# Patient Record
Sex: FEMALE | Race: BLACK OR AFRICAN AMERICAN | NOT HISPANIC OR LATINO | Employment: FULL TIME | RURAL
[De-identification: names, ages, dates, MRNs, and addresses within clinical notes are randomized per-mention and may not be internally consistent; named-entity substitution may affect disease eponyms.]

---

## 2017-12-19 ENCOUNTER — HISTORICAL (OUTPATIENT)
Dept: ADMINISTRATIVE | Facility: HOSPITAL | Age: 47
End: 2017-12-19

## 2017-12-26 LAB
LAB AP COMMENTS: NORMAL
LAB AP GENERAL CAT - HISTORICAL: NORMAL
LAB AP INTERPRETATION/RESULT - HISTORICAL: NEGATIVE
LAB AP SPECIMEN ADEQUACY - HISTORICAL: NORMAL
LAB AP SPECIMEN SUBMITTED - HISTORICAL: NORMAL

## 2018-08-07 LAB — CRC RECOMMENDATION EXT: NORMAL

## 2019-01-25 ENCOUNTER — HISTORICAL (OUTPATIENT)
Dept: ADMINISTRATIVE | Facility: HOSPITAL | Age: 49
End: 2019-01-25

## 2019-01-25 LAB
LAB AP CLINICAL INFORMATION: NORMAL
LAB AP GENERAL CAT - HISTORICAL: NORMAL
LAB AP INTERPRETATION/RESULT - HISTORICAL: NEGATIVE
LAB AP SPECIMEN ADEQUACY - HISTORICAL: NORMAL
LAB AP SPECIMEN SUBMITTED - HISTORICAL: NORMAL

## 2020-10-30 ENCOUNTER — HISTORICAL (OUTPATIENT)
Dept: ADMINISTRATIVE | Facility: HOSPITAL | Age: 50
End: 2020-10-30

## 2021-06-28 RX ORDER — MELOXICAM 7.5 MG/1
7.5 TABLET ORAL DAILY
COMMUNITY
End: 2021-08-30 | Stop reason: SDUPTHER

## 2021-06-28 RX ORDER — HYDROCHLOROTHIAZIDE 25 MG/1
25 TABLET ORAL DAILY
COMMUNITY
End: 2021-08-30 | Stop reason: SDUPTHER

## 2021-06-28 RX ORDER — ZOLPIDEM TARTRATE 10 MG/1
10 TABLET ORAL NIGHTLY PRN
Qty: 30 TABLET | Refills: 2 | Status: SHIPPED | OUTPATIENT
Start: 2021-06-28 | End: 2021-06-30 | Stop reason: SDUPTHER

## 2021-06-28 RX ORDER — POTASSIUM CHLORIDE 20 MEQ/1
20 TABLET, EXTENDED RELEASE ORAL ONCE
COMMUNITY
End: 2022-06-29 | Stop reason: SDUPTHER

## 2021-06-28 RX ORDER — ZOLPIDEM TARTRATE 10 MG/1
10 TABLET ORAL NIGHTLY PRN
COMMUNITY
End: 2021-06-28 | Stop reason: SDUPTHER

## 2021-06-28 RX ORDER — HYDROXYZINE PAMOATE 25 MG/1
25 CAPSULE ORAL EVERY 8 HOURS PRN
COMMUNITY
End: 2021-08-30 | Stop reason: SDUPTHER

## 2021-06-28 RX ORDER — ESTRADIOL 1 MG/1
1 TABLET ORAL DAILY
COMMUNITY
End: 2022-03-01 | Stop reason: ALTCHOICE

## 2021-06-28 RX ORDER — CETIRIZINE HYDROCHLORIDE 10 MG/1
10 TABLET ORAL DAILY
COMMUNITY
End: 2021-08-30 | Stop reason: SDUPTHER

## 2021-06-30 DIAGNOSIS — G47.00 INSOMNIA, UNSPECIFIED TYPE: Primary | ICD-10-CM

## 2021-06-30 RX ORDER — ZOLPIDEM TARTRATE 10 MG/1
10 TABLET ORAL NIGHTLY PRN
Qty: 30 TABLET | Refills: 2 | Status: SHIPPED | OUTPATIENT
Start: 2021-06-30 | End: 2021-09-29 | Stop reason: SDUPTHER

## 2021-08-30 ENCOUNTER — OFFICE VISIT (OUTPATIENT)
Dept: FAMILY MEDICINE | Facility: CLINIC | Age: 51
End: 2021-08-30
Payer: COMMERCIAL

## 2021-08-30 VITALS
TEMPERATURE: 98 F | BODY MASS INDEX: 34.52 KG/M2 | RESPIRATION RATE: 18 BRPM | HEIGHT: 66 IN | DIASTOLIC BLOOD PRESSURE: 78 MMHG | OXYGEN SATURATION: 96 % | WEIGHT: 214.81 LBS | HEART RATE: 97 BPM | SYSTOLIC BLOOD PRESSURE: 104 MMHG

## 2021-08-30 DIAGNOSIS — R10.9 ABDOMINAL PAIN, UNSPECIFIED ABDOMINAL LOCATION: ICD-10-CM

## 2021-08-30 DIAGNOSIS — L29.9 ITCHING: ICD-10-CM

## 2021-08-30 DIAGNOSIS — M19.90 ARTHRITIS: ICD-10-CM

## 2021-08-30 DIAGNOSIS — I10 HYPERTENSION, UNSPECIFIED TYPE: Primary | ICD-10-CM

## 2021-08-30 DIAGNOSIS — J31.0 RHINITIS, UNSPECIFIED TYPE: ICD-10-CM

## 2021-08-30 LAB
BILIRUB SERPL-MCNC: NEGATIVE MG/DL
BLOOD URINE, POC: NEGATIVE
COLOR, POC UA: YELLOW
GLUCOSE UR QL STRIP: NEGATIVE
KETONES UR QL STRIP: NEGATIVE
LEUKOCYTE ESTERASE URINE, POC: NEGATIVE
NITRITE, POC UA: NEGATIVE
PH, POC UA: 6
PROTEIN, POC: NEGATIVE
SPECIFIC GRAVITY, POC UA: >=1.03
UROBILINOGEN, POC UA: 0.2

## 2021-08-30 PROCEDURE — 3078F PR MOST RECENT DIASTOLIC BLOOD PRESSURE < 80 MM HG: ICD-10-PCS | Mod: ,,, | Performed by: NURSE PRACTITIONER

## 2021-08-30 PROCEDURE — 99214 OFFICE O/P EST MOD 30 MIN: CPT | Mod: ,,, | Performed by: NURSE PRACTITIONER

## 2021-08-30 PROCEDURE — 81003 POCT URINALYSIS W/O SCOPE: ICD-10-PCS | Mod: QW,,, | Performed by: NURSE PRACTITIONER

## 2021-08-30 PROCEDURE — 3074F PR MOST RECENT SYSTOLIC BLOOD PRESSURE < 130 MM HG: ICD-10-PCS | Mod: ,,, | Performed by: NURSE PRACTITIONER

## 2021-08-30 PROCEDURE — 3078F DIAST BP <80 MM HG: CPT | Mod: ,,, | Performed by: NURSE PRACTITIONER

## 2021-08-30 PROCEDURE — 3044F PR MOST RECENT HEMOGLOBIN A1C LEVEL <7.0%: ICD-10-PCS | Mod: ,,, | Performed by: NURSE PRACTITIONER

## 2021-08-30 PROCEDURE — 1160F RVW MEDS BY RX/DR IN RCRD: CPT | Mod: ,,, | Performed by: NURSE PRACTITIONER

## 2021-08-30 PROCEDURE — 3008F BODY MASS INDEX DOCD: CPT | Mod: ,,, | Performed by: NURSE PRACTITIONER

## 2021-08-30 PROCEDURE — 3044F HG A1C LEVEL LT 7.0%: CPT | Mod: ,,, | Performed by: NURSE PRACTITIONER

## 2021-08-30 PROCEDURE — 1159F PR MEDICATION LIST DOCUMENTED IN MEDICAL RECORD: ICD-10-PCS | Mod: ,,, | Performed by: NURSE PRACTITIONER

## 2021-08-30 PROCEDURE — 3008F PR BODY MASS INDEX (BMI) DOCUMENTED: ICD-10-PCS | Mod: ,,, | Performed by: NURSE PRACTITIONER

## 2021-08-30 PROCEDURE — 81003 URINALYSIS AUTO W/O SCOPE: CPT | Mod: QW,,, | Performed by: NURSE PRACTITIONER

## 2021-08-30 PROCEDURE — 3074F SYST BP LT 130 MM HG: CPT | Mod: ,,, | Performed by: NURSE PRACTITIONER

## 2021-08-30 PROCEDURE — 99214 PR OFFICE/OUTPT VISIT, EST, LEVL IV, 30-39 MIN: ICD-10-PCS | Mod: ,,, | Performed by: NURSE PRACTITIONER

## 2021-08-30 PROCEDURE — 1160F PR REVIEW ALL MEDS BY PRESCRIBER/CLIN PHARMACIST DOCUMENTED: ICD-10-PCS | Mod: ,,, | Performed by: NURSE PRACTITIONER

## 2021-08-30 PROCEDURE — 1159F MED LIST DOCD IN RCRD: CPT | Mod: ,,, | Performed by: NURSE PRACTITIONER

## 2021-08-30 RX ORDER — HYDROCHLOROTHIAZIDE 25 MG/1
25 TABLET ORAL DAILY
Qty: 90 TABLET | Refills: 1 | Status: SHIPPED | OUTPATIENT
Start: 2021-08-30 | End: 2022-03-01 | Stop reason: SDUPTHER

## 2021-08-30 RX ORDER — CETIRIZINE HYDROCHLORIDE 10 MG/1
10 TABLET ORAL DAILY
Qty: 90 TABLET | Refills: 1 | Status: SHIPPED | OUTPATIENT
Start: 2021-08-30 | End: 2022-02-14 | Stop reason: SDUPTHER

## 2021-08-30 RX ORDER — MELOXICAM 7.5 MG/1
7.5 TABLET ORAL DAILY
Qty: 90 TABLET | Refills: 1 | Status: SHIPPED | OUTPATIENT
Start: 2021-08-30 | End: 2022-03-01 | Stop reason: SDUPTHER

## 2021-08-30 RX ORDER — HYDROXYZINE PAMOATE 25 MG/1
25 CAPSULE ORAL EVERY 8 HOURS PRN
Qty: 30 CAPSULE | Refills: 2 | Status: SHIPPED | OUTPATIENT
Start: 2021-08-30 | End: 2022-11-03

## 2021-09-16 ENCOUNTER — OFFICE VISIT (OUTPATIENT)
Dept: GASTROENTEROLOGY | Facility: CLINIC | Age: 51
End: 2021-09-16
Payer: COMMERCIAL

## 2021-09-16 VITALS
BODY MASS INDEX: 34.55 KG/M2 | DIASTOLIC BLOOD PRESSURE: 83 MMHG | RESPIRATION RATE: 17 BRPM | HEART RATE: 66 BPM | HEIGHT: 66 IN | SYSTOLIC BLOOD PRESSURE: 114 MMHG | OXYGEN SATURATION: 100 % | WEIGHT: 215 LBS

## 2021-09-16 DIAGNOSIS — R10.9 ABDOMINAL PAIN, UNSPECIFIED ABDOMINAL LOCATION: ICD-10-CM

## 2021-09-16 DIAGNOSIS — R10.33 PERIUMBILICAL PAIN: Primary | ICD-10-CM

## 2021-09-16 PROCEDURE — 3008F BODY MASS INDEX DOCD: CPT | Mod: ,,, | Performed by: NURSE PRACTITIONER

## 2021-09-16 PROCEDURE — 3074F SYST BP LT 130 MM HG: CPT | Mod: ,,, | Performed by: NURSE PRACTITIONER

## 2021-09-16 PROCEDURE — 3079F DIAST BP 80-89 MM HG: CPT | Mod: ,,, | Performed by: NURSE PRACTITIONER

## 2021-09-16 PROCEDURE — 3044F PR MOST RECENT HEMOGLOBIN A1C LEVEL <7.0%: ICD-10-PCS | Mod: ,,, | Performed by: NURSE PRACTITIONER

## 2021-09-16 PROCEDURE — 3008F PR BODY MASS INDEX (BMI) DOCUMENTED: ICD-10-PCS | Mod: ,,, | Performed by: NURSE PRACTITIONER

## 2021-09-16 PROCEDURE — 99204 OFFICE O/P NEW MOD 45 MIN: CPT | Mod: ,,, | Performed by: NURSE PRACTITIONER

## 2021-09-16 PROCEDURE — 3079F PR MOST RECENT DIASTOLIC BLOOD PRESSURE 80-89 MM HG: ICD-10-PCS | Mod: ,,, | Performed by: NURSE PRACTITIONER

## 2021-09-16 PROCEDURE — 99204 PR OFFICE/OUTPT VISIT, NEW, LEVL IV, 45-59 MIN: ICD-10-PCS | Mod: ,,, | Performed by: NURSE PRACTITIONER

## 2021-09-16 PROCEDURE — 3074F PR MOST RECENT SYSTOLIC BLOOD PRESSURE < 130 MM HG: ICD-10-PCS | Mod: ,,, | Performed by: NURSE PRACTITIONER

## 2021-09-16 PROCEDURE — 3044F HG A1C LEVEL LT 7.0%: CPT | Mod: ,,, | Performed by: NURSE PRACTITIONER

## 2021-09-16 RX ORDER — HYDROCODONE BITARTRATE AND ACETAMINOPHEN 5; 325 MG/1; MG/1
1 TABLET ORAL
COMMUNITY
Start: 2021-07-15 | End: 2023-05-09

## 2021-09-16 RX ORDER — PANTOPRAZOLE SODIUM 40 MG/1
40 TABLET, DELAYED RELEASE ORAL DAILY
Qty: 90 TABLET | Refills: 3 | Status: SHIPPED | OUTPATIENT
Start: 2021-09-16 | End: 2022-01-31 | Stop reason: SDUPTHER

## 2021-09-16 RX ORDER — ESTERIFIED ESTROGEN AND METHYLTESTOSTERONE .625; 1.25 MG/1; MG/1
1 TABLET ORAL DAILY
COMMUNITY
Start: 2021-07-16 | End: 2022-06-28

## 2021-09-16 RX ORDER — TOPIRAMATE 100 MG/1
100 TABLET, FILM COATED ORAL NIGHTLY
COMMUNITY
Start: 2021-08-28 | End: 2022-11-03

## 2021-09-16 RX ORDER — SEMAGLUTIDE 1.34 MG/ML
INJECTION, SOLUTION SUBCUTANEOUS
COMMUNITY
Start: 2021-08-28 | End: 2022-06-28

## 2021-09-16 RX ORDER — POTASSIUM CHLORIDE 1500 MG/1
20 TABLET, EXTENDED RELEASE ORAL DAILY
COMMUNITY
Start: 2021-05-08 | End: 2022-06-29 | Stop reason: SDUPTHER

## 2021-09-27 DIAGNOSIS — Z11.52 ENCOUNTER FOR SCREENING FOR COVID-19: Primary | ICD-10-CM

## 2021-09-29 ENCOUNTER — HOSPITAL ENCOUNTER (OUTPATIENT)
Dept: GASTROENTEROLOGY | Facility: HOSPITAL | Age: 51
Discharge: HOME | End: 2021-09-29
Attending: NURSE PRACTITIONER
Payer: COMMERCIAL

## 2021-09-29 ENCOUNTER — ANESTHESIA (OUTPATIENT)
Dept: GASTROENTEROLOGY | Facility: HOSPITAL | Age: 51
End: 2021-09-29
Payer: COMMERCIAL

## 2021-09-29 ENCOUNTER — ANESTHESIA EVENT (OUTPATIENT)
Dept: GASTROENTEROLOGY | Facility: HOSPITAL | Age: 51
End: 2021-09-29
Payer: COMMERCIAL

## 2021-09-29 VITALS
DIASTOLIC BLOOD PRESSURE: 71 MMHG | BODY MASS INDEX: 34.54 KG/M2 | HEART RATE: 70 BPM | OXYGEN SATURATION: 100 % | RESPIRATION RATE: 13 BRPM | TEMPERATURE: 98 F | WEIGHT: 214 LBS | SYSTOLIC BLOOD PRESSURE: 101 MMHG

## 2021-09-29 DIAGNOSIS — G47.00 INSOMNIA, UNSPECIFIED TYPE: ICD-10-CM

## 2021-09-29 DIAGNOSIS — R10.33 PERIUMBILICAL PAIN: ICD-10-CM

## 2021-09-29 PROCEDURE — 63600175 PHARM REV CODE 636 W HCPCS: Performed by: NURSE ANESTHETIST, CERTIFIED REGISTERED

## 2021-09-29 PROCEDURE — 88305 TISSUE EXAM BY PATHOLOGIST: CPT | Mod: SUR | Performed by: STUDENT IN AN ORGANIZED HEALTH CARE EDUCATION/TRAINING PROGRAM

## 2021-09-29 PROCEDURE — 43239 PR EGD, FLEX, W/BIOPSY, SGL/MULTI: ICD-10-PCS | Mod: ,,, | Performed by: STUDENT IN AN ORGANIZED HEALTH CARE EDUCATION/TRAINING PROGRAM

## 2021-09-29 PROCEDURE — D9220A PRA ANESTHESIA: ICD-10-PCS | Mod: ,,, | Performed by: NURSE ANESTHETIST, CERTIFIED REGISTERED

## 2021-09-29 PROCEDURE — 43239 EGD BIOPSY SINGLE/MULTIPLE: CPT | Mod: ,,, | Performed by: STUDENT IN AN ORGANIZED HEALTH CARE EDUCATION/TRAINING PROGRAM

## 2021-09-29 PROCEDURE — 25000003 PHARM REV CODE 250: Performed by: NURSE ANESTHETIST, CERTIFIED REGISTERED

## 2021-09-29 PROCEDURE — 27201423 OPTIME MED/SURG SUP & DEVICES STERILE SUPPLY

## 2021-09-29 PROCEDURE — 37000008 HC ANESTHESIA 1ST 15 MINUTES

## 2021-09-29 PROCEDURE — 88305 TISSUE EXAM BY PATHOLOGIST: CPT | Mod: 26,,, | Performed by: PATHOLOGY

## 2021-09-29 PROCEDURE — C1889 IMPLANT/INSERT DEVICE, NOC: HCPCS

## 2021-09-29 PROCEDURE — 88305 SURGICAL PATHOLOGY: ICD-10-PCS | Mod: 26,,, | Performed by: PATHOLOGY

## 2021-09-29 PROCEDURE — 25000003 PHARM REV CODE 250: Performed by: STUDENT IN AN ORGANIZED HEALTH CARE EDUCATION/TRAINING PROGRAM

## 2021-09-29 PROCEDURE — D9220A PRA ANESTHESIA: Mod: ,,, | Performed by: NURSE ANESTHETIST, CERTIFIED REGISTERED

## 2021-09-29 PROCEDURE — 88342 IMHCHEM/IMCYTCHM 1ST ANTB: CPT | Mod: 26,,, | Performed by: PATHOLOGY

## 2021-09-29 PROCEDURE — 43239 EGD BIOPSY SINGLE/MULTIPLE: CPT

## 2021-09-29 PROCEDURE — 88342 SURGICAL PATHOLOGY: ICD-10-PCS | Mod: 26,,, | Performed by: PATHOLOGY

## 2021-09-29 RX ORDER — LIDOCAINE HYDROCHLORIDE 20 MG/ML
INJECTION INTRAVENOUS
Status: DISCONTINUED | OUTPATIENT
Start: 2021-09-29 | End: 2021-09-29

## 2021-09-29 RX ORDER — ZOLPIDEM TARTRATE 10 MG/1
10 TABLET ORAL NIGHTLY PRN
Qty: 30 TABLET | Refills: 2 | Status: SHIPPED | OUTPATIENT
Start: 2021-09-29 | End: 2021-12-27 | Stop reason: SDUPTHER

## 2021-09-29 RX ORDER — PROPOFOL 10 MG/ML
VIAL (ML) INTRAVENOUS
Status: DISCONTINUED | OUTPATIENT
Start: 2021-09-29 | End: 2021-09-29

## 2021-09-29 RX ORDER — SODIUM CHLORIDE 9 MG/ML
INJECTION, SOLUTION INTRAVENOUS CONTINUOUS
Status: DISCONTINUED | OUTPATIENT
Start: 2021-09-29 | End: 2021-09-30 | Stop reason: HOSPADM

## 2021-09-29 RX ORDER — SODIUM CHLORIDE 0.9 % (FLUSH) 0.9 %
10 SYRINGE (ML) INJECTION
Status: DISCONTINUED | OUTPATIENT
Start: 2021-09-29 | End: 2021-09-30 | Stop reason: HOSPADM

## 2021-09-29 RX ADMIN — SODIUM CHLORIDE: 9 INJECTION, SOLUTION INTRAVENOUS at 10:09

## 2021-09-29 RX ADMIN — LIDOCAINE HYDROCHLORIDE 50 MG: 20 INJECTION, SOLUTION INTRAVENOUS at 10:09

## 2021-09-29 RX ADMIN — PROPOFOL 100 MG: 10 INJECTION, EMULSION INTRAVENOUS at 10:09

## 2021-09-30 LAB
ESTROGEN SERPL-MCNC: NORMAL PG/ML
LAB AP GROSS DESCRIPTION: NORMAL
LAB AP LABORATORY NOTES: NORMAL
T3RU NFR SERPL: NORMAL %

## 2021-11-01 ENCOUNTER — OFFICE VISIT (OUTPATIENT)
Dept: OBSTETRICS AND GYNECOLOGY | Facility: CLINIC | Age: 51
End: 2021-11-01
Payer: COMMERCIAL

## 2021-11-01 VITALS
DIASTOLIC BLOOD PRESSURE: 71 MMHG | WEIGHT: 220.38 LBS | BODY MASS INDEX: 35.42 KG/M2 | SYSTOLIC BLOOD PRESSURE: 100 MMHG | HEIGHT: 66 IN

## 2021-11-01 DIAGNOSIS — Z01.419 WOMEN'S ANNUAL ROUTINE GYNECOLOGICAL EXAMINATION: Primary | ICD-10-CM

## 2021-11-01 DIAGNOSIS — Z12.4 SCREENING FOR MALIGNANT NEOPLASM OF THE CERVIX: ICD-10-CM

## 2021-11-01 DIAGNOSIS — N95.1 MENOPAUSAL AND FEMALE CLIMACTERIC STATES: ICD-10-CM

## 2021-11-01 DIAGNOSIS — R68.82 DECREASED LIBIDO: ICD-10-CM

## 2021-11-01 PROCEDURE — 88142 CYTOPATH C/V THIN LAYER: CPT | Mod: GCY | Performed by: OBSTETRICS & GYNECOLOGY

## 2021-11-01 PROCEDURE — 1159F MED LIST DOCD IN RCRD: CPT | Mod: ,,, | Performed by: OBSTETRICS & GYNECOLOGY

## 2021-11-01 PROCEDURE — 3074F SYST BP LT 130 MM HG: CPT | Mod: ,,, | Performed by: OBSTETRICS & GYNECOLOGY

## 2021-11-01 PROCEDURE — 3008F BODY MASS INDEX DOCD: CPT | Mod: ,,, | Performed by: OBSTETRICS & GYNECOLOGY

## 2021-11-01 PROCEDURE — 3044F PR MOST RECENT HEMOGLOBIN A1C LEVEL <7.0%: ICD-10-PCS | Mod: ,,, | Performed by: OBSTETRICS & GYNECOLOGY

## 2021-11-01 PROCEDURE — 1159F PR MEDICATION LIST DOCUMENTED IN MEDICAL RECORD: ICD-10-PCS | Mod: ,,, | Performed by: OBSTETRICS & GYNECOLOGY

## 2021-11-01 PROCEDURE — 3074F PR MOST RECENT SYSTOLIC BLOOD PRESSURE < 130 MM HG: ICD-10-PCS | Mod: ,,, | Performed by: OBSTETRICS & GYNECOLOGY

## 2021-11-01 PROCEDURE — 3044F HG A1C LEVEL LT 7.0%: CPT | Mod: ,,, | Performed by: OBSTETRICS & GYNECOLOGY

## 2021-11-01 PROCEDURE — 99214 OFFICE O/P EST MOD 30 MIN: CPT | Mod: PBBFAC | Performed by: OBSTETRICS & GYNECOLOGY

## 2021-11-01 PROCEDURE — 99396 PR PREVENTIVE VISIT,EST,40-64: ICD-10-PCS | Mod: S$PBB,,, | Performed by: OBSTETRICS & GYNECOLOGY

## 2021-11-01 PROCEDURE — 3078F PR MOST RECENT DIASTOLIC BLOOD PRESSURE < 80 MM HG: ICD-10-PCS | Mod: ,,, | Performed by: OBSTETRICS & GYNECOLOGY

## 2021-11-01 PROCEDURE — 3078F DIAST BP <80 MM HG: CPT | Mod: ,,, | Performed by: OBSTETRICS & GYNECOLOGY

## 2021-11-01 PROCEDURE — 1160F PR REVIEW ALL MEDS BY PRESCRIBER/CLIN PHARMACIST DOCUMENTED: ICD-10-PCS | Mod: ,,, | Performed by: OBSTETRICS & GYNECOLOGY

## 2021-11-01 PROCEDURE — 1160F RVW MEDS BY RX/DR IN RCRD: CPT | Mod: ,,, | Performed by: OBSTETRICS & GYNECOLOGY

## 2021-11-01 PROCEDURE — 99396 PREV VISIT EST AGE 40-64: CPT | Mod: S$PBB,,, | Performed by: OBSTETRICS & GYNECOLOGY

## 2021-11-01 PROCEDURE — 3008F PR BODY MASS INDEX (BMI) DOCUMENTED: ICD-10-PCS | Mod: ,,, | Performed by: OBSTETRICS & GYNECOLOGY

## 2021-11-01 RX ORDER — ESTRADIOL 1 MG/1
1 TABLET ORAL DAILY
Qty: 90 TABLET | Refills: 3 | Status: SHIPPED | OUTPATIENT
Start: 2021-11-01 | End: 2022-11-07

## 2021-11-03 LAB
GH SERPL-MCNC: NORMAL NG/ML
INSULIN SERPL-ACNC: NORMAL U[IU]/ML
LAB AP CLINICAL INFORMATION: NORMAL
LAB AP GYN INTERPRETATION: NEGATIVE
LAB AP PAP DISCLAIMER COMMENTS: NORMAL
RENIN PLAS-CCNC: NORMAL NG/ML/H

## 2021-12-27 DIAGNOSIS — G47.00 INSOMNIA, UNSPECIFIED TYPE: ICD-10-CM

## 2021-12-27 RX ORDER — ZOLPIDEM TARTRATE 10 MG/1
10 TABLET ORAL NIGHTLY PRN
Qty: 30 TABLET | Refills: 2 | Status: SHIPPED | OUTPATIENT
Start: 2021-12-27 | End: 2022-03-01

## 2022-01-31 RX ORDER — PANTOPRAZOLE SODIUM 40 MG/1
40 TABLET, DELAYED RELEASE ORAL DAILY
Qty: 30 TABLET | Refills: 3 | Status: SHIPPED | OUTPATIENT
Start: 2022-01-31 | End: 2022-03-01 | Stop reason: SDUPTHER

## 2022-02-14 DIAGNOSIS — J31.0 RHINITIS, UNSPECIFIED TYPE: ICD-10-CM

## 2022-02-14 RX ORDER — CETIRIZINE HYDROCHLORIDE 10 MG/1
10 TABLET ORAL DAILY
Qty: 90 TABLET | Refills: 1 | Status: SHIPPED | OUTPATIENT
Start: 2022-02-14 | End: 2022-03-01 | Stop reason: SDUPTHER

## 2022-02-14 NOTE — TELEPHONE ENCOUNTER
----- Message from Phyllis Prasad sent at 2/14/2022  2:07 PM CST -----  Regarding: med refiill  Made pt an appt for march 1, she needs a refill on hr zyrtec to [facility] in [place]

## 2022-03-01 ENCOUNTER — OFFICE VISIT (OUTPATIENT)
Dept: FAMILY MEDICINE | Facility: CLINIC | Age: 52
End: 2022-03-01
Payer: COMMERCIAL

## 2022-03-01 VITALS
DIASTOLIC BLOOD PRESSURE: 64 MMHG | WEIGHT: 216.63 LBS | HEART RATE: 80 BPM | SYSTOLIC BLOOD PRESSURE: 100 MMHG | TEMPERATURE: 98 F | OXYGEN SATURATION: 100 % | HEIGHT: 67 IN | BODY MASS INDEX: 34 KG/M2 | RESPIRATION RATE: 20 BRPM

## 2022-03-01 DIAGNOSIS — K21.9 GASTROESOPHAGEAL REFLUX DISEASE WITHOUT ESOPHAGITIS: ICD-10-CM

## 2022-03-01 DIAGNOSIS — G47.09 OTHER INSOMNIA: ICD-10-CM

## 2022-03-01 DIAGNOSIS — I10 ESSENTIAL HYPERTENSION: ICD-10-CM

## 2022-03-01 DIAGNOSIS — K59.09 CHRONIC CONSTIPATION: Primary | ICD-10-CM

## 2022-03-01 DIAGNOSIS — J30.89 SEASONAL ALLERGIC RHINITIS DUE TO OTHER ALLERGIC TRIGGER: ICD-10-CM

## 2022-03-01 DIAGNOSIS — B37.0 THRUSH, ORAL: ICD-10-CM

## 2022-03-01 DIAGNOSIS — M79.674 PAIN OF TOE OF RIGHT FOOT: ICD-10-CM

## 2022-03-01 DIAGNOSIS — L60.2 THICKENED NAIL: ICD-10-CM

## 2022-03-01 DIAGNOSIS — M19.90 ARTHRITIS: ICD-10-CM

## 2022-03-01 PROCEDURE — 3008F PR BODY MASS INDEX (BMI) DOCUMENTED: ICD-10-PCS | Mod: ,,, | Performed by: FAMILY MEDICINE

## 2022-03-01 PROCEDURE — 1159F PR MEDICATION LIST DOCUMENTED IN MEDICAL RECORD: ICD-10-PCS | Mod: ,,, | Performed by: FAMILY MEDICINE

## 2022-03-01 PROCEDURE — 1159F MED LIST DOCD IN RCRD: CPT | Mod: ,,, | Performed by: FAMILY MEDICINE

## 2022-03-01 PROCEDURE — 3074F SYST BP LT 130 MM HG: CPT | Mod: ,,, | Performed by: FAMILY MEDICINE

## 2022-03-01 PROCEDURE — 3078F DIAST BP <80 MM HG: CPT | Mod: ,,, | Performed by: FAMILY MEDICINE

## 2022-03-01 PROCEDURE — 99214 OFFICE O/P EST MOD 30 MIN: CPT | Mod: ,,, | Performed by: FAMILY MEDICINE

## 2022-03-01 PROCEDURE — 3074F PR MOST RECENT SYSTOLIC BLOOD PRESSURE < 130 MM HG: ICD-10-PCS | Mod: ,,, | Performed by: FAMILY MEDICINE

## 2022-03-01 PROCEDURE — 1160F PR REVIEW ALL MEDS BY PRESCRIBER/CLIN PHARMACIST DOCUMENTED: ICD-10-PCS | Mod: ,,, | Performed by: FAMILY MEDICINE

## 2022-03-01 PROCEDURE — 3078F PR MOST RECENT DIASTOLIC BLOOD PRESSURE < 80 MM HG: ICD-10-PCS | Mod: ,,, | Performed by: FAMILY MEDICINE

## 2022-03-01 PROCEDURE — 3008F BODY MASS INDEX DOCD: CPT | Mod: ,,, | Performed by: FAMILY MEDICINE

## 2022-03-01 PROCEDURE — 1160F RVW MEDS BY RX/DR IN RCRD: CPT | Mod: ,,, | Performed by: FAMILY MEDICINE

## 2022-03-01 PROCEDURE — 99214 PR OFFICE/OUTPT VISIT, EST, LEVL IV, 30-39 MIN: ICD-10-PCS | Mod: ,,, | Performed by: FAMILY MEDICINE

## 2022-03-01 RX ORDER — HYDROCHLOROTHIAZIDE 25 MG/1
25 TABLET ORAL DAILY
Qty: 90 TABLET | Refills: 1 | Status: SHIPPED | OUTPATIENT
Start: 2022-03-01 | End: 2022-06-28 | Stop reason: SDUPTHER

## 2022-03-01 RX ORDER — TRAZODONE HYDROCHLORIDE 50 MG/1
50 TABLET ORAL NIGHTLY
Qty: 30 TABLET | Refills: 3 | Status: SHIPPED | OUTPATIENT
Start: 2022-03-01 | End: 2022-03-28 | Stop reason: DRUGHIGH

## 2022-03-01 RX ORDER — POLYETHYLENE GLYCOL 3350 17 G/17G
POWDER, FOR SOLUTION ORAL
Qty: 1530 G | Refills: 3 | Status: SHIPPED | OUTPATIENT
Start: 2022-03-01 | End: 2022-11-03

## 2022-03-01 RX ORDER — PANTOPRAZOLE SODIUM 40 MG/1
40 TABLET, DELAYED RELEASE ORAL DAILY
Qty: 30 TABLET | Refills: 5 | Status: SHIPPED | OUTPATIENT
Start: 2022-03-01 | End: 2022-11-03 | Stop reason: SDUPTHER

## 2022-03-01 RX ORDER — MELOXICAM 7.5 MG/1
7.5 TABLET ORAL DAILY
Qty: 90 TABLET | Refills: 1 | Status: SHIPPED | OUTPATIENT
Start: 2022-03-01 | End: 2022-06-28 | Stop reason: SDUPTHER

## 2022-03-01 RX ORDER — NYSTATIN 100000 [USP'U]/ML
SUSPENSION ORAL
Qty: 200 ML | Refills: 0 | Status: SHIPPED | OUTPATIENT
Start: 2022-03-01 | End: 2022-10-19 | Stop reason: SDUPTHER

## 2022-03-01 RX ORDER — DOCUSATE SODIUM 100 MG/1
100 CAPSULE, LIQUID FILLED ORAL 2 TIMES DAILY
Qty: 180 CAPSULE | Refills: 1 | Status: SHIPPED | OUTPATIENT
Start: 2022-03-01 | End: 2022-06-28 | Stop reason: SDUPTHER

## 2022-03-01 RX ORDER — CETIRIZINE HYDROCHLORIDE 10 MG/1
10 TABLET ORAL DAILY
Qty: 90 TABLET | Refills: 1 | Status: SHIPPED | OUTPATIENT
Start: 2022-03-01 | End: 2022-06-28 | Stop reason: SDUPTHER

## 2022-03-01 RX ORDER — ZOLPIDEM TARTRATE 10 MG/1
10 TABLET ORAL NIGHTLY PRN
Qty: 30 TABLET | Refills: 2 | Status: CANCELLED | OUTPATIENT
Start: 2022-03-01

## 2022-03-01 NOTE — PROGRESS NOTES
"Clinic Note    Patient Name: Marcell Tinoco  : 1970  MRN: [id number]    HPI:    Chief Complaint   Patient presents with    Medication Refill     CONSTIPATION problems taking otc meds but wants a prescription.    Follow-up     F/u urgent care left ear still having problems from last weekend, also have concerns with  SORE TONGUE    Foot Pain     Right toe possible ingrown toenail     Ms. Marcell Tinoco is a 51 y.o. female who present to clinic today with CC of follow up on chronic disease processes including HTN, GERD, seasonal allergies, menopausal hot flashes, and OA related pain.  Patient reports chronic issues are well controlled on current medication regimen.   Denies problems or side effects with current medication regimen.   Patient does report a h/o insomnia. Reports she was previously prescribed ambien but states it is not working for her anymore.   Reports chronic issues with constipation. Reports she has been taking OTC laxatives but would like to try a different medication that she can take daily.  She reports her last colonoscopy was at age 48 at [facility]. She had polyps removed and was told to repeat in 5 years at age 53. Reports this is not due.  She has had an EGD within the last year at [facility].  Reports she saw urgent care 1 week ago with L ear pain. Reports she is still having issues with L ear and reports her tongue is sore. Reports she feels like there is something in her ear.  Reports some R foot/great toe pain. Reports she may have an ingrown toenail. Reports it is been sore for over a month. Reports she had a pedicure and "they pulled out an ingrown toenail". Denies erythema or active drainage or increased warmth. Reports, however, it is still painful.   Patient is, otherwise, without complaints.     Medications:  Current Outpatient Medications on File Prior to Visit   Medication Sig Dispense Refill    estradioL (ESTRACE) 1 MG tablet Take 1 mg by mouth once daily.   "    estradioL (ESTRACE) 1 MG tablet Take 1 tablet (1 mg total) by mouth once daily. 90 tablet 3    hydrOXYzine pamoate (VISTARIL) 25 MG Cap Take 1 capsule (25 mg total) by mouth every 8 (eight) hours as needed (itching). 30 capsule 2    potassium chloride SA (K-DUR,KLOR-CON) 20 MEQ tablet Take 20 mEq by mouth once. Extended Release 20 meq po daily      topiramate (TOPAMAX) 100 MG tablet Take 100 mg by mouth every evening.      [DISCONTINUED] cetirizine (ZYRTEC) 10 MG tablet Take 1 tablet (10 mg total) by mouth once daily. 90 tablet 1    [DISCONTINUED] hydroCHLOROthiazide (HYDRODIURIL) 25 MG tablet Take 1 tablet (25 mg total) by mouth once daily. 90 tablet 1    [DISCONTINUED] meloxicam (MOBIC) 7.5 MG tablet Take 1 tablet (7.5 mg total) by mouth once daily. 90 tablet 1    [DISCONTINUED] pantoprazole (PROTONIX) 40 MG tablet Take 1 tablet (40 mg total) by mouth once daily. 30 tablet 3    [DISCONTINUED] zolpidem (AMBIEN) 10 mg Tab Take 1 tablet (10 mg total) by mouth nightly as needed (as needed for insomnia). 30 tablet 2    estrogens,conjugated,-methyltestosterone 0.625-1.25mg (ESTRATEST HS) 0.625-1.25 mg per tablet Take 1 tablet by mouth once daily.      HYDROcodone-acetaminophen (NORCO) 5-325 mg per tablet Take 1 tablet by mouth every 4 to 6 hours as needed.      OZEMPIC 1 mg/dose (4 mg/3 mL) INJECT 1 MG UNDER THE SKIN WEEKLY      potassium chloride (K-TAB) 20 mEq Take 20 mEq by mouth once daily.       No current facility-administered medications on file prior to visit.         Allergies: Sulfa (sulfonamide antibiotics)      Past Medical History:    Past Medical History:   Diagnosis Date    Arthritis        Past Surgical History:    Past Surgical History:   Procedure Laterality Date    breast reduction      CHOLECYSTECTOMY  10/28/2013    Dr. Paige    COLONOSCOPY  2018    gastric sleeve      HYSTERECTOMY  10/28/2013    Robotic, BSO,Cystoscopy, TOT-    OOPHORECTOMY      TUBAL LIGATION       "tummy tuck           Social History:    Social History     Tobacco Use   Smoking Status Never Smoker   Smokeless Tobacco Never Used     Social History     Substance and Sexual Activity   Alcohol Use Not Currently     Social History     Substance and Sexual Activity   Drug Use Never         Family History:    Family History   Problem Relation Age of Onset    Colon cancer Sister        Review of Systems:    Review of Systems   Constitutional: Negative for appetite change, chills, fatigue, fever and unexpected weight change.   Eyes: Negative for visual disturbance.   Respiratory: Negative for cough and shortness of breath.    Cardiovascular: Negative for chest pain and leg swelling.   Gastrointestinal: Positive for constipation. Negative for abdominal pain, change in bowel habit, diarrhea, nausea, vomiting and change in bowel habit.        Reports last BM was last night   Musculoskeletal: Negative for arthralgias.   Integumentary:  Negative for rash.   Neurological: Negative for dizziness and headaches.   Psychiatric/Behavioral: The patient is not nervous/anxious.         Vitals:    /64 (BP Location: Right arm, Patient Position: Sitting, BP Method: Medium (Manual))   Pulse 80   Temp 97.6 °F (36.4 °C) (Temporal)   Resp 20   Ht 5' 7" (1.702 m)   Wt 98.2 kg (216 lb 9.6 oz)   SpO2 100%   BMI 33.92 kg/m²        Physical Exam:    Physical Exam  Constitutional:       General: She is not in acute distress.     Appearance: Normal appearance.   HENT:      Right Ear: Tympanic membrane normal.      Left Ear: Tympanic membrane normal.      Nose: Nose normal.      Mouth/Throat:      Mouth: Mucous membranes are moist. No oral lesions.      Pharynx: Oropharynx is clear.   Eyes:      Conjunctiva/sclera: Conjunctivae normal.   Cardiovascular:      Rate and Rhythm: Normal rate and regular rhythm.      Heart sounds: Normal heart sounds. No murmur heard.  Pulmonary:      Effort: Pulmonary effort is normal. No respiratory " distress.      Breath sounds: Normal breath sounds. No wheezing, rhonchi or rales.   Abdominal:      General: Bowel sounds are normal.      Palpations: Abdomen is soft.      Tenderness: There is no abdominal tenderness.   Musculoskeletal:      Cervical back: Neck supple.   Feet:      Right foot:      Skin integrity: No erythema or warmth.      Toenail Condition: Right toenails are abnormally thick.   Skin:     Findings: No rash.   Neurological:      General: No focal deficit present.      Mental Status: She is alert. Mental status is at baseline.   Psychiatric:         Mood and Affect: Mood normal.         Assessment/Plan:   Chronic constipation  -     polyethylene glycol (GLYCOLAX) 17 gram/dose powder; Mix one capful with 8 ounces of juice or water and drink daily as needed for constipation  Dispense: 1530 g; Refill: 3  -     docusate sodium (COLACE) 100 MG capsule; Take 1 capsule (100 mg total) by mouth 2 (two) times daily. For constipation  Dispense: 180 capsule; Refill: 1    Seasonal allergic rhinitis due to other allergic trigger  -     cetirizine (ZYRTEC) 10 MG tablet; Take 1 tablet (10 mg total) by mouth once daily.  Dispense: 90 tablet; Refill: 1    Arthritis  -     meloxicam (MOBIC) 7.5 MG tablet; Take 1 tablet (7.5 mg total) by mouth once daily.  Dispense: 90 tablet; Refill: 1    Essential hypertension  -     hydroCHLOROthiazide (HYDRODIURIL) 25 MG tablet; Take 1 tablet (25 mg total) by mouth once daily.  Dispense: 90 tablet; Refill: 1    Other insomnia  -     traZODone (DESYREL) 50 MG tablet; Take 1 tablet (50 mg total) by mouth every evening. For insomnia  Dispense: 30 tablet; Refill: 3 - new medication  - D/c ambien as it is ineffective  - Risks/benefits/potential side effects of trazodone reviewed and discussed with patient    Pain of toe of right foot  -     Ambulatory referral/consult to Podiatry; Future; Expected date: 03/08/2022    Thickened nail  -     Ambulatory referral/consult to Podiatry;  Future; Expected date: 03/08/2022    Gastroesophageal reflux disease without esophagitis  -     pantoprazole (PROTONIX) 40 MG tablet; Take 1 tablet (40 mg total) by mouth once daily.  Dispense: 30 tablet; Refill: 5    Thrush, oral  -     nystatin (MYCOSTATIN) 100,000 unit/mL suspension; Swish and spit 5 mL by mouth 4 X daily X 10 days  Dispense: 200 mL; Refill: 0     Care Gaps:  Record release - [facility] report   Record release - mammogram report - [facility]    RTC as scheduled for [employer].  RTC sooner if needed.   Patient voiced understanding and is agreeable to plan.      Aileen Rodriguez MD    Family Medicine

## 2022-03-28 ENCOUNTER — OFFICE VISIT (OUTPATIENT)
Dept: FAMILY MEDICINE | Facility: CLINIC | Age: 52
End: 2022-03-28
Payer: COMMERCIAL

## 2022-03-28 VITALS
BODY MASS INDEX: 33.62 KG/M2 | WEIGHT: 214.19 LBS | RESPIRATION RATE: 20 BRPM | HEIGHT: 67 IN | HEART RATE: 70 BPM | SYSTOLIC BLOOD PRESSURE: 118 MMHG | DIASTOLIC BLOOD PRESSURE: 80 MMHG | TEMPERATURE: 98 F | OXYGEN SATURATION: 98 %

## 2022-03-28 DIAGNOSIS — Z11.59 ENCOUNTER FOR HEPATITIS C SCREENING TEST FOR LOW RISK PATIENT: ICD-10-CM

## 2022-03-28 DIAGNOSIS — G47.09 OTHER INSOMNIA: ICD-10-CM

## 2022-03-28 DIAGNOSIS — Z13.1 SCREENING FOR DIABETES MELLITUS: ICD-10-CM

## 2022-03-28 DIAGNOSIS — Z13.220 SCREENING FOR LIPID DISORDERS: ICD-10-CM

## 2022-03-28 DIAGNOSIS — Z00.01 ANNUAL VISIT FOR GENERAL ADULT MEDICAL EXAMINATION WITH ABNORMAL FINDINGS: Primary | ICD-10-CM

## 2022-03-28 PROCEDURE — 1159F MED LIST DOCD IN RCRD: CPT | Mod: ,,, | Performed by: FAMILY MEDICINE

## 2022-03-28 PROCEDURE — 1160F RVW MEDS BY RX/DR IN RCRD: CPT | Mod: ,,, | Performed by: FAMILY MEDICINE

## 2022-03-28 PROCEDURE — 3008F BODY MASS INDEX DOCD: CPT | Mod: ,,, | Performed by: FAMILY MEDICINE

## 2022-03-28 PROCEDURE — 83036 HEMOGLOBIN GLYCOSYLATED A1C: CPT | Mod: ,,, | Performed by: CLINICAL MEDICAL LABORATORY

## 2022-03-28 PROCEDURE — 86803 HEPATITIS C ANTIBODY: ICD-10-PCS | Mod: ,,, | Performed by: CLINICAL MEDICAL LABORATORY

## 2022-03-28 PROCEDURE — 99396 PR PREVENTIVE VISIT,EST,40-64: ICD-10-PCS | Mod: ,,, | Performed by: FAMILY MEDICINE

## 2022-03-28 PROCEDURE — 82947 GLUCOSE, FASTING: ICD-10-PCS | Mod: ,,, | Performed by: CLINICAL MEDICAL LABORATORY

## 2022-03-28 PROCEDURE — 86803 HEPATITIS C AB TEST: CPT | Mod: ,,, | Performed by: CLINICAL MEDICAL LABORATORY

## 2022-03-28 PROCEDURE — 3079F PR MOST RECENT DIASTOLIC BLOOD PRESSURE 80-89 MM HG: ICD-10-PCS | Mod: ,,, | Performed by: FAMILY MEDICINE

## 2022-03-28 PROCEDURE — 3079F DIAST BP 80-89 MM HG: CPT | Mod: ,,, | Performed by: FAMILY MEDICINE

## 2022-03-28 PROCEDURE — 80061 LIPID PANEL: CPT | Mod: ,,, | Performed by: CLINICAL MEDICAL LABORATORY

## 2022-03-28 PROCEDURE — 82947 ASSAY GLUCOSE BLOOD QUANT: CPT | Mod: ,,, | Performed by: CLINICAL MEDICAL LABORATORY

## 2022-03-28 PROCEDURE — 3074F SYST BP LT 130 MM HG: CPT | Mod: ,,, | Performed by: FAMILY MEDICINE

## 2022-03-28 PROCEDURE — 1159F PR MEDICATION LIST DOCUMENTED IN MEDICAL RECORD: ICD-10-PCS | Mod: ,,, | Performed by: FAMILY MEDICINE

## 2022-03-28 PROCEDURE — 1160F PR REVIEW ALL MEDS BY PRESCRIBER/CLIN PHARMACIST DOCUMENTED: ICD-10-PCS | Mod: ,,, | Performed by: FAMILY MEDICINE

## 2022-03-28 PROCEDURE — 80061 LIPID PANEL: ICD-10-PCS | Mod: ,,, | Performed by: CLINICAL MEDICAL LABORATORY

## 2022-03-28 PROCEDURE — 3008F PR BODY MASS INDEX (BMI) DOCUMENTED: ICD-10-PCS | Mod: ,,, | Performed by: FAMILY MEDICINE

## 2022-03-28 PROCEDURE — 99396 PREV VISIT EST AGE 40-64: CPT | Mod: ,,, | Performed by: FAMILY MEDICINE

## 2022-03-28 PROCEDURE — 83036 HEMOGLOBIN A1C: ICD-10-PCS | Mod: ,,, | Performed by: CLINICAL MEDICAL LABORATORY

## 2022-03-28 PROCEDURE — 3074F PR MOST RECENT SYSTOLIC BLOOD PRESSURE < 130 MM HG: ICD-10-PCS | Mod: ,,, | Performed by: FAMILY MEDICINE

## 2022-03-28 RX ORDER — ERGOCALCIFEROL 1.25 MG/1
CAPSULE ORAL
COMMUNITY
Start: 2022-02-03 | End: 2022-11-03

## 2022-03-28 RX ORDER — DULAGLUTIDE 1.5 MG/.5ML
1.5 INJECTION, SOLUTION SUBCUTANEOUS
COMMUNITY
Start: 2022-03-01 | End: 2022-11-03

## 2022-03-28 RX ORDER — TRAZODONE HYDROCHLORIDE 100 MG/1
100 TABLET ORAL NIGHTLY
Qty: 90 TABLET | Refills: 1 | Status: SHIPPED | OUTPATIENT
Start: 2022-03-28 | End: 2022-11-03 | Stop reason: SDUPTHER

## 2022-03-28 RX ORDER — MUPIROCIN 20 MG/G
OINTMENT TOPICAL 2 TIMES DAILY
COMMUNITY
Start: 2021-11-21 | End: 2022-11-03

## 2022-03-28 RX ORDER — PHENTERMINE HYDROCHLORIDE 37.5 MG/1
37.5 TABLET ORAL NIGHTLY
COMMUNITY
Start: 2022-03-01 | End: 2022-11-03

## 2022-03-28 NOTE — PROGRESS NOTES
Clinic Note    Patient Name: Marcell Tinoco  : 1970  MRN: [id number]    HPI:    Chief Complaint   Patient presents with    Healthy you      Ms. Marcell Tinoco is a 51 y.o. female who present to clinic today with CC of [employer].  Patient has a PMH significant for GERD, migraines, seasonal allergies, vitamin D deficiency, OA, and constipation.  Of note, patient was started on trazodone at previous visit for insomnia. Reports minimal improvement in symptoms. She had previously taken ambien but reported at last visit it was not working as well as it did previously. This is when we made the change to trazodone.  Patient reports chronic issues are well controlled on current medication regimen.  Otherwise, denies any problems or side effects with medications.  Mammogram: [facility] -  with normal results; records requested  Colonoscopy: at age 48 - [facility] - advised to repeat in 5 years; records requested  Pap Smear: Dr. George 2021 - results in system  Tobacco: Denies  Alcohol: rare  Drug use: denies  H/o STDs: Denies  Immunizations: declined flu vaccine  Otherwise, without complaints.    Medications:  Current Outpatient Medications on File Prior to Visit   Medication Sig Dispense Refill    cetirizine (ZYRTEC) 10 MG tablet Take 1 tablet (10 mg total) by mouth once daily. 90 tablet 1    docusate sodium (COLACE) 100 MG capsule Take 1 capsule (100 mg total) by mouth 2 (two) times daily. For constipation 180 capsule 1    ergocalciferol (ERGOCALCIFEROL) 50,000 unit Cap TAKE 1 CAPSULE ONCE PER WEEK      estradioL (ESTRACE) 1 MG tablet Take 1 tablet (1 mg total) by mouth once daily. 90 tablet 3    estrogens,conjugated,-methyltestosterone 0.625-1.25mg (ESTRATEST HS) 0.625-1.25 mg per tablet Take 1 tablet by mouth once daily.      hydroCHLOROthiazide (HYDRODIURIL) 25 MG tablet Take 1 tablet (25 mg total) by mouth once daily. 90 tablet 1    meloxicam (MOBIC) 7.5  MG tablet Take 1 tablet (7.5 mg total) by mouth once daily. 90 tablet 1    mupirocin (BACTROBAN) 2 % ointment 2 (two) times daily.      pantoprazole (PROTONIX) 40 MG tablet Take 1 tablet (40 mg total) by mouth once daily. 30 tablet 5    phentermine (ADIPEX-P) 37.5 mg tablet Take 37.5 mg by mouth nightly.      polyethylene glycol (GLYCOLAX) 17 gram/dose powder Mix one capful with 8 ounces of juice or water and drink daily as needed for constipation 1530 g 3    topiramate (TOPAMAX) 100 MG tablet Take 100 mg by mouth every evening. Unsure of dosage BID      TRULICITY 1.5 mg/0.5 mL pen injector Inject 1.5 mg into the skin every 7 days.      HYDROcodone-acetaminophen (NORCO) 5-325 mg per tablet Take 1 tablet by mouth every 4 to 6 hours as needed.      hydrOXYzine pamoate (VISTARIL) 25 MG Cap Take 1 capsule (25 mg total) by mouth every 8 (eight) hours as needed (itching). (Patient not taking: Reported on 3/28/2022) 30 capsule 2    nystatin (MYCOSTATIN) 100,000 unit/mL suspension Swish and spit 5 mL by mouth 4 X daily X 10 days (Patient not taking: Reported on 3/28/2022) 200 mL 0    OZEMPIC 1 mg/dose (4 mg/3 mL) INJECT 1 MG UNDER THE SKIN WEEKLY      potassium chloride (K-TAB) 20 mEq Take 20 mEq by mouth once daily.      potassium chloride SA (K-DUR,KLOR-CON) 20 MEQ tablet Take 20 mEq by mouth once. Extended Release 20 meq po daily      [DISCONTINUED] traZODone (DESYREL) 50 MG tablet Take 1 tablet (50 mg total) by mouth every evening. For insomnia (Patient not taking: Reported on 3/28/2022) 30 tablet 3     No current facility-administered medications on file prior to visit.         Allergies: Sulfa (sulfonamide antibiotics)      Past Medical History:    Past Medical History:   Diagnosis Date    Arthritis        Past Surgical History:    Past Surgical History:   Procedure Laterality Date    breast reduction      CHOLECYSTECTOMY  10/28/2013    Dr. Paige    COLONOSCOPY  2018    gastric sleeve       "HYSTERECTOMY  10/28/2013    Robotic, BSO,Cystoscopy, TOT-    OOPHORECTOMY      TUBAL LIGATION      tummy tuck           Social History:    Social History     Tobacco Use   Smoking Status Never Smoker   Smokeless Tobacco Never Used     Social History     Substance and Sexual Activity   Alcohol Use Not Currently     Social History     Substance and Sexual Activity   Drug Use Never         Family History:    Family History   Problem Relation Age of Onset    Colon cancer Sister        Review of Systems:    Review of Systems   Constitutional: Negative for appetite change, chills, fatigue, fever and unexpected weight change.   Eyes: Negative for visual disturbance.   Respiratory: Negative for cough and shortness of breath.    Cardiovascular: Negative for chest pain and leg swelling.   Gastrointestinal: Positive for constipation. Negative for abdominal pain, change in bowel habit, diarrhea, nausea, vomiting and change in bowel habit.        Reports chronic constipation improved with miralax and stool softener   Musculoskeletal: Negative for arthralgias.   Integumentary:  Negative for rash.   Neurological: Negative for dizziness and headaches.   Psychiatric/Behavioral: The patient is not nervous/anxious.         Vitals:    /80 (BP Location: Left arm, Patient Position: Sitting, BP Method: Large (Manual))   Pulse 70   Temp 97.8 °F (36.6 °C) (Temporal)   Resp 20   Ht 5' 7" (1.702 m)   Wt 97.2 kg (214 lb 3.2 oz)   SpO2 98%   BMI 33.55 kg/m²        Physical Exam:    Physical Exam  Constitutional:       General: She is not in acute distress.     Appearance: Normal appearance. She is obese.   HENT:      Nose: Nose normal.      Mouth/Throat:      Mouth: Mucous membranes are moist.      Pharynx: Oropharynx is clear.   Eyes:      Conjunctiva/sclera: Conjunctivae normal.   Cardiovascular:      Rate and Rhythm: Normal rate and regular rhythm.      Heart sounds: Normal heart sounds. No murmur heard.  Pulmonary: "      Effort: Pulmonary effort is normal. No respiratory distress.      Breath sounds: Normal breath sounds. No wheezing, rhonchi or rales.   Abdominal:      General: Bowel sounds are normal.      Palpations: Abdomen is soft.      Tenderness: There is no abdominal tenderness.   Musculoskeletal:      Cervical back: Neck supple.   Skin:     Findings: No rash.   Neurological:      General: No focal deficit present.      Mental Status: She is alert. Mental status is at baseline.   Psychiatric:         Mood and Affect: Mood normal.         Assessment/Plan:   Annual visit for general adult medical examination with abnormal findings  -     Hepatitis C Antibody; Future; Expected date: 03/28/2022  -     Lipid Panel; Future; Expected date: 03/28/2022  -     Glucose, Fasting; Future; Expected date: 03/28/2022  -     Hemoglobin A1C; Future; Expected date: 03/28/2022    Screening for diabetes mellitus  -     Glucose, Fasting; Future; Expected date: 03/28/2022  -     Hemoglobin A1C; Future; Expected date: 03/28/2022    Screening for lipid disorders  -     Lipid Panel; Future; Expected date: 03/28/2022    Encounter for hepatitis C screening test for low risk patient  -     Hepatitis C Antibody; Future; Expected date: 03/28/2022    Other insomnia  -     traZODone (DESYREL) 100 MG tablet; Take 1 tablet (100 mg total) by mouth every evening.  Dispense: 90 tablet; Refill: 1 - dose increase from 50 mg PO QHS.    [employer] Healthy You:  Lifestyle: patient is up to date on mammogram, colonoscopy, and pap smear - records requested  Diet: DASH diet  Exercise: recommend 30 minutes of exercise at least 5 times per week  Tobacco: denies tobacco use; avoid 2nd and 3rd hand smoke exposure  Biometrics: RTC in 1 year for [employer] (03/29/23) and in 3 months for chronic follow up (06/28/22).     RTC in 3 months for chronic follow up.  RTC in 1 year for [employer]  RTC sooner if needed.   Patient voiced understanding and is agreeable to  plan.      Aileen Rodriguez MD    [facility]

## 2022-03-29 LAB
CHOLEST SERPL-MCNC: 153 MG/DL (ref 0–200)
CHOLEST/HDLC SERPL: 2.1 {RATIO}
EST. AVERAGE GLUCOSE BLD GHB EST-MCNC: 81 MG/DL
GLUCOSE SERPL-MCNC: 62 MG/DL (ref 74–106)
HBA1C MFR BLD HPLC: 5 % (ref 4.5–6.6)
HCV AB SER QL: NORMAL
HDLC SERPL-MCNC: 72 MG/DL (ref 40–60)
LDLC SERPL CALC-MCNC: 64 MG/DL
LDLC/HDLC SERPL: 0.9 {RATIO}
NONHDLC SERPL-MCNC: 81 MG/DL
TRIGL SERPL-MCNC: 83 MG/DL (ref 35–150)
VLDLC SERPL-MCNC: 17 MG/DL

## 2022-06-28 ENCOUNTER — OFFICE VISIT (OUTPATIENT)
Dept: FAMILY MEDICINE | Facility: CLINIC | Age: 52
End: 2022-06-28
Payer: COMMERCIAL

## 2022-06-28 VITALS
HEART RATE: 86 BPM | BODY MASS INDEX: 33.93 KG/M2 | TEMPERATURE: 98 F | DIASTOLIC BLOOD PRESSURE: 82 MMHG | RESPIRATION RATE: 18 BRPM | OXYGEN SATURATION: 98 % | HEIGHT: 67 IN | WEIGHT: 216.19 LBS | SYSTOLIC BLOOD PRESSURE: 127 MMHG

## 2022-06-28 DIAGNOSIS — Z02.89 ENCOUNTER FOR PHYSICAL EXAMINATION RELATED TO EMPLOYMENT: ICD-10-CM

## 2022-06-28 DIAGNOSIS — J30.89 SEASONAL ALLERGIC RHINITIS DUE TO OTHER ALLERGIC TRIGGER: Chronic | ICD-10-CM

## 2022-06-28 DIAGNOSIS — I10 ESSENTIAL HYPERTENSION: Primary | Chronic | ICD-10-CM

## 2022-06-28 DIAGNOSIS — M62.838 MUSCLE SPASM: ICD-10-CM

## 2022-06-28 DIAGNOSIS — K59.09 CHRONIC CONSTIPATION: Chronic | ICD-10-CM

## 2022-06-28 DIAGNOSIS — M19.90 ARTHRITIS: Chronic | ICD-10-CM

## 2022-06-28 PROBLEM — J30.2 SEASONAL ALLERGIC RHINITIS: Chronic | Status: ACTIVE | Noted: 2022-06-28

## 2022-06-28 LAB
ALBUMIN SERPL BCP-MCNC: 3.5 G/DL (ref 3.5–5)
ALBUMIN/GLOB SERPL: 0.9 {RATIO}
ALP SERPL-CCNC: 107 U/L (ref 41–108)
ALT SERPL W P-5'-P-CCNC: 37 U/L (ref 13–56)
ANION GAP SERPL CALCULATED.3IONS-SCNC: 8 MMOL/L (ref 7–16)
AST SERPL W P-5'-P-CCNC: 43 U/L (ref 15–37)
BILIRUB SERPL-MCNC: 0.6 MG/DL (ref 0–1.2)
BUN SERPL-MCNC: 13 MG/DL (ref 7–18)
BUN/CREAT SERPL: 13 (ref 6–20)
CALCIUM SERPL-MCNC: 9.3 MG/DL (ref 8.5–10.1)
CHLORIDE SERPL-SCNC: 103 MMOL/L (ref 98–107)
CO2 SERPL-SCNC: 33 MMOL/L (ref 21–32)
CREAT SERPL-MCNC: 0.99 MG/DL (ref 0.55–1.02)
CREAT UR-MCNC: 113 MG/DL (ref 28–219)
GLOBULIN SER-MCNC: 3.8 G/DL (ref 2–4)
GLUCOSE SERPL-MCNC: 61 MG/DL (ref 74–106)
MICROALBUMIN UR-MCNC: 0.6 MG/DL (ref 0–2.8)
MICROALBUMIN/CREAT RATIO PNL UR: 5.3 MG/G (ref 0–30)
POTASSIUM SERPL-SCNC: 3.2 MMOL/L (ref 3.5–5.1)
PROT SERPL-MCNC: 7.3 G/DL (ref 6.4–8.2)
SODIUM SERPL-SCNC: 141 MMOL/L (ref 136–145)

## 2022-06-28 PROCEDURE — 82570 ASSAY OF URINE CREATININE: CPT | Mod: ,,, | Performed by: CLINICAL MEDICAL LABORATORY

## 2022-06-28 PROCEDURE — 80053 COMPREHEN METABOLIC PANEL: CPT | Mod: ,,, | Performed by: CLINICAL MEDICAL LABORATORY

## 2022-06-28 PROCEDURE — 3079F PR MOST RECENT DIASTOLIC BLOOD PRESSURE 80-89 MM HG: ICD-10-PCS | Mod: ,,, | Performed by: FAMILY MEDICINE

## 2022-06-28 PROCEDURE — 3074F SYST BP LT 130 MM HG: CPT | Mod: ,,, | Performed by: FAMILY MEDICINE

## 2022-06-28 PROCEDURE — 82043 MICROALBUMIN / CREATININE RATIO URINE: ICD-10-PCS | Mod: ,,, | Performed by: CLINICAL MEDICAL LABORATORY

## 2022-06-28 PROCEDURE — 1159F PR MEDICATION LIST DOCUMENTED IN MEDICAL RECORD: ICD-10-PCS | Mod: ,,, | Performed by: FAMILY MEDICINE

## 2022-06-28 PROCEDURE — 3044F PR MOST RECENT HEMOGLOBIN A1C LEVEL <7.0%: ICD-10-PCS | Mod: ,,, | Performed by: FAMILY MEDICINE

## 2022-06-28 PROCEDURE — 1159F MED LIST DOCD IN RCRD: CPT | Mod: ,,, | Performed by: FAMILY MEDICINE

## 2022-06-28 PROCEDURE — 82043 UR ALBUMIN QUANTITATIVE: CPT | Mod: ,,, | Performed by: CLINICAL MEDICAL LABORATORY

## 2022-06-28 PROCEDURE — 99213 PR OFFICE/OUTPT VISIT, EST, LEVL III, 20-29 MIN: ICD-10-PCS | Mod: ,,, | Performed by: FAMILY MEDICINE

## 2022-06-28 PROCEDURE — 3079F DIAST BP 80-89 MM HG: CPT | Mod: ,,, | Performed by: FAMILY MEDICINE

## 2022-06-28 PROCEDURE — 3074F PR MOST RECENT SYSTOLIC BLOOD PRESSURE < 130 MM HG: ICD-10-PCS | Mod: ,,, | Performed by: FAMILY MEDICINE

## 2022-06-28 PROCEDURE — 82570 MICROALBUMIN / CREATININE RATIO URINE: ICD-10-PCS | Mod: ,,, | Performed by: CLINICAL MEDICAL LABORATORY

## 2022-06-28 PROCEDURE — 1160F RVW MEDS BY RX/DR IN RCRD: CPT | Mod: ,,, | Performed by: FAMILY MEDICINE

## 2022-06-28 PROCEDURE — 3008F PR BODY MASS INDEX (BMI) DOCUMENTED: ICD-10-PCS | Mod: ,,, | Performed by: FAMILY MEDICINE

## 2022-06-28 PROCEDURE — 3044F HG A1C LEVEL LT 7.0%: CPT | Mod: ,,, | Performed by: FAMILY MEDICINE

## 2022-06-28 PROCEDURE — 80053 COMPREHENSIVE METABOLIC PANEL: ICD-10-PCS | Mod: ,,, | Performed by: CLINICAL MEDICAL LABORATORY

## 2022-06-28 PROCEDURE — 1160F PR REVIEW ALL MEDS BY PRESCRIBER/CLIN PHARMACIST DOCUMENTED: ICD-10-PCS | Mod: ,,, | Performed by: FAMILY MEDICINE

## 2022-06-28 PROCEDURE — 3008F BODY MASS INDEX DOCD: CPT | Mod: ,,, | Performed by: FAMILY MEDICINE

## 2022-06-28 PROCEDURE — 99213 OFFICE O/P EST LOW 20 MIN: CPT | Mod: ,,, | Performed by: FAMILY MEDICINE

## 2022-06-28 RX ORDER — DOCUSATE SODIUM 100 MG/1
100 CAPSULE, LIQUID FILLED ORAL 2 TIMES DAILY
Qty: 180 CAPSULE | Refills: 1 | Status: SHIPPED | OUTPATIENT
Start: 2022-06-28 | End: 2023-05-03

## 2022-06-28 RX ORDER — MELOXICAM 7.5 MG/1
7.5 TABLET ORAL DAILY
Qty: 90 TABLET | Refills: 1 | Status: SHIPPED | OUTPATIENT
Start: 2022-06-28 | End: 2022-12-20

## 2022-06-28 RX ORDER — HYDROCHLOROTHIAZIDE 25 MG/1
25 TABLET ORAL DAILY
Qty: 90 TABLET | Refills: 1 | Status: SHIPPED | OUTPATIENT
Start: 2022-06-28 | End: 2022-11-03 | Stop reason: SDUPTHER

## 2022-06-28 RX ORDER — CETIRIZINE HYDROCHLORIDE 10 MG/1
10 TABLET ORAL DAILY
Qty: 90 TABLET | Refills: 1 | Status: SHIPPED | OUTPATIENT
Start: 2022-06-28 | End: 2022-11-03 | Stop reason: SDUPTHER

## 2022-06-28 RX ORDER — LINACLOTIDE 145 UG/1
1 CAPSULE, GELATIN COATED ORAL DAILY
COMMUNITY
Start: 2022-06-25 | End: 2023-03-28 | Stop reason: SDUPTHER

## 2022-06-28 RX ORDER — METHOCARBAMOL 500 MG/1
500 TABLET, FILM COATED ORAL NIGHTLY PRN
Qty: 45 TABLET | Refills: 1 | Status: SHIPPED | OUTPATIENT
Start: 2022-06-28 | End: 2022-07-08

## 2022-06-28 NOTE — PROGRESS NOTES
Clinic Note    Patient Name: Marcell Tinoco  : 1970  MRN: [id number]    HPI:    Chief Complaint   Patient presents with    Color Me Healthy    Employment Physical    Medication Refill       Ms. Marcell Tinoco is a 51 y.o. female who present to clinic today with CC of Color Me Healthy for HTN.  Patient has a PMH significant for HTN, GERD, migraines, insomnia, anxiety, seasonal allergies, OA, and chronic constipation.  Patient reports chronic issues are well controlled on current medication regimen. Denies any problems or side effects with medications.  Patient is also here for an employment physical. Patient is about to start a new job at [employer] in [place], [place]. She has paperwork with her today to be completed for this job. She is required to have a TB skin test but plans to get this [facility] [employer] as she is currently working another job in [place].   Patient reports a h/o bulging disc in her back. Reports that this does not cause her any issues. Reports occasionally at night she has a tingling pain in her L leg if she lays on that side. She feels like it could be related to spasm in her back. Reports it does improve with position changes. Denies any symptoms during the day when she is up and active.   Patient reports she is doing well.   She is, otherwise, without complaints.     Medications:  Current Outpatient Medications on File Prior to Visit   Medication Sig Dispense Refill    estradioL (ESTRACE) 1 MG tablet Take 1 tablet (1 mg total) by mouth once daily. 90 tablet 3    LINZESS 145 mcg Cap capsule Take 1 capsule by mouth once daily.      mupirocin (BACTROBAN) 2 % ointment 2 (two) times daily.      nystatin (MYCOSTATIN) 100,000 unit/mL suspension Swish and spit 5 mL by mouth 4 X daily X 10 days 200 mL 0    topiramate (TOPAMAX) 100 MG tablet Take 100 mg by mouth every evening. Unsure of dosage BID      traZODone (DESYREL) 100  MG tablet Take 1 tablet (100 mg total) by mouth every evening. 90 tablet 1    [DISCONTINUED] cetirizine (ZYRTEC) 10 MG tablet Take 1 tablet (10 mg total) by mouth once daily. 90 tablet 1    [DISCONTINUED] docusate sodium (COLACE) 100 MG capsule Take 1 capsule (100 mg total) by mouth 2 (two) times daily. For constipation 180 capsule 1    [DISCONTINUED] hydroCHLOROthiazide (HYDRODIURIL) 25 MG tablet Take 1 tablet (25 mg total) by mouth once daily. 90 tablet 1    [DISCONTINUED] meloxicam (MOBIC) 7.5 MG tablet Take 1 tablet (7.5 mg total) by mouth once daily. 90 tablet 1    ergocalciferol (ERGOCALCIFEROL) 50,000 unit Cap TAKE 1 CAPSULE ONCE PER WEEK      HYDROcodone-acetaminophen (NORCO) 5-325 mg per tablet Take 1 tablet by mouth every 4 to 6 hours as needed.      hydrOXYzine pamoate (VISTARIL) 25 MG Cap Take 1 capsule (25 mg total) by mouth every 8 (eight) hours as needed (itching). (Patient not taking: Reported on 6/28/2022) 30 capsule 2    pantoprazole (PROTONIX) 40 MG tablet Take 1 tablet (40 mg total) by mouth once daily. 30 tablet 5    phentermine (ADIPEX-P) 37.5 mg tablet Take 37.5 mg by mouth nightly.      polyethylene glycol (GLYCOLAX) 17 gram/dose powder Mix one capful with 8 ounces of juice or water and drink daily as needed for constipation (Patient not taking: Reported on 6/28/2022) 1530 g 3    potassium chloride (K-TAB) 20 mEq Take 20 mEq by mouth once daily.      potassium chloride SA (K-DUR,KLOR-CON) 20 MEQ tablet Take 20 mEq by mouth once. Extended Release 20 meq po daily      TRULICITY 1.5 mg/0.5 mL pen injector Inject 1.5 mg into the skin every 7 days.      [DISCONTINUED] estrogens,conjugated,-methyltestosterone 0.625-1.25mg (ESTRATEST HS) 0.625-1.25 mg per tablet Take 1 tablet by mouth once daily.      [DISCONTINUED] OZEMPIC 1 mg/dose (4 mg/3 mL) INJECT 1 MG UNDER THE SKIN WEEKLY       No current facility-administered medications on file prior to visit.         Allergies: Sulfa  "(sulfonamide antibiotics)      Past Medical History:    Past Medical History:   Diagnosis Date    Arthritis     History of rectal polyps 08/07/2018       Past Surgical History:    Past Surgical History:   Procedure Laterality Date    breast reduction      CHOLECYSTECTOMY  10/28/2013    Dr. Paige    COLONOSCOPY  2018    gastric sleeve      HYSTERECTOMY  10/28/2013    Robotic, BSO,Cystoscopy, TOT-    OOPHORECTOMY      TUBAL LIGATION      tummy tuck           Social History:    Social History     Tobacco Use   Smoking Status Never Smoker   Smokeless Tobacco Never Used     Social History     Substance and Sexual Activity   Alcohol Use Not Currently     Social History     Substance and Sexual Activity   Drug Use Never         Family History:    Family History   Problem Relation Age of Onset    Colon cancer Sister        Review of Systems:    Review of Systems   Constitutional: Negative for appetite change, chills, fatigue, fever and unexpected weight change.   Eyes: Negative for visual disturbance.   Respiratory: Negative for cough and shortness of breath.    Cardiovascular: Negative for chest pain and leg swelling.   Gastrointestinal: Positive for constipation. Negative for abdominal pain, change in bowel habit, diarrhea, nausea, vomiting and change in bowel habit.        Chronic constipation - recently got linzess improved on insurance   Musculoskeletal: Negative for arthralgias.   Integumentary:  Negative for rash.   Neurological: Negative for dizziness and headaches.   Psychiatric/Behavioral: The patient is not nervous/anxious.         Vitals:    /82 (BP Location: Left arm, Patient Position: Sitting, BP Method: Large (Manual))   Pulse 86   Temp 97.5 °F (36.4 °C) (Oral)   Resp 18   Ht 5' 7" (1.702 m)   Wt 98.1 kg (216 lb 3.2 oz)   SpO2 98%   BMI 33.86 kg/m²        Physical Exam:    Physical Exam  Constitutional:       General: She is not in acute distress.     Appearance: Normal " appearance. She is obese.   HENT:      Nose: Nose normal.      Mouth/Throat:      Mouth: Mucous membranes are moist.      Pharynx: Oropharynx is clear.   Eyes:      Conjunctiva/sclera: Conjunctivae normal.   Cardiovascular:      Rate and Rhythm: Normal rate and regular rhythm.      Heart sounds: Normal heart sounds. No murmur heard.  Pulmonary:      Effort: Pulmonary effort is normal. No respiratory distress.      Breath sounds: Normal breath sounds. No wheezing, rhonchi or rales.   Abdominal:      General: Bowel sounds are normal.      Palpations: Abdomen is soft.      Tenderness: There is no abdominal tenderness.   Musculoskeletal:         General: No swelling or tenderness.      Cervical back: Neck supple.      Right lower leg: No edema.      Left lower leg: No edema.   Skin:     Findings: No rash.   Neurological:      General: No focal deficit present.      Mental Status: She is alert. Mental status is at baseline.   Psychiatric:         Mood and Affect: Mood normal.         Assessment/Plan:   Essential hypertension  -     hydroCHLOROthiazide (HYDRODIURIL) 25 MG tablet; Take 1 tablet (25 mg total) by mouth once daily.  Dispense: 90 tablet; Refill: 1  -     Comprehensive Metabolic Panel; Future; Expected date: 06/28/2022  -     Microalbumin/Creatinine Ratio, Urine    Seasonal allergic rhinitis due to other allergic trigger  -     cetirizine (ZYRTEC) 10 MG tablet; Take 1 tablet (10 mg total) by mouth once daily.  Dispense: 90 tablet; Refill: 1    Arthritis  -     meloxicam (MOBIC) 7.5 MG tablet; Take 1 tablet (7.5 mg total) by mouth once daily.  Dispense: 90 tablet; Refill: 1    Chronic constipation  -     docusate sodium (COLACE) 100 MG capsule; Take 1 capsule (100 mg total) by mouth 2 (two) times daily. For constipation  Dispense: 180 capsule; Refill: 1    Muscle spasm  -     methocarbamoL (ROBAXIN) 500 MG Tab; Take 1 tablet (500 mg total) by mouth nightly as needed (muscle spasm/pain. May cause drowsiness.).   Dispense: 45 tablet; Refill: 1 - risks/benefits/potential side effects reviewed and discussed with patient.    Encounter for physical examination related to employment        - Paperwork completed. Copy to be scanned to chart. Patient provided with original.        RTC in 4 months for chronic follow up.  RTC sooner if needed.   Patient voiced understanding and is agreeable to plan.      Aileen Rodriguez MD    Family Medicine

## 2022-06-29 RX ORDER — POTASSIUM CHLORIDE 1500 MG/1
20 TABLET, EXTENDED RELEASE ORAL DAILY
Qty: 90 TABLET | Refills: 1 | Status: SHIPPED | OUTPATIENT
Start: 2022-06-29 | End: 2023-04-12 | Stop reason: DRUGHIGH

## 2022-09-15 RX ORDER — FLUCONAZOLE 150 MG/1
TABLET ORAL
Qty: 2 TABLET | Refills: 0 | Status: SHIPPED | OUTPATIENT
Start: 2022-09-15 | End: 2022-11-03

## 2022-09-15 RX ORDER — METRONIDAZOLE 500 MG/1
500 TABLET ORAL EVERY 12 HOURS
Qty: 14 TABLET | Refills: 0 | Status: SHIPPED | OUTPATIENT
Start: 2022-09-15 | End: 2022-11-03

## 2022-11-03 ENCOUNTER — OFFICE VISIT (OUTPATIENT)
Dept: FAMILY MEDICINE | Facility: CLINIC | Age: 52
End: 2022-11-03
Payer: COMMERCIAL

## 2022-11-03 VITALS
HEART RATE: 85 BPM | BODY MASS INDEX: 36.22 KG/M2 | RESPIRATION RATE: 20 BRPM | HEIGHT: 66 IN | WEIGHT: 225.38 LBS | OXYGEN SATURATION: 99 % | DIASTOLIC BLOOD PRESSURE: 76 MMHG | SYSTOLIC BLOOD PRESSURE: 112 MMHG | TEMPERATURE: 98 F

## 2022-11-03 DIAGNOSIS — E66.09 CLASS 2 OBESITY DUE TO EXCESS CALORIES WITHOUT SERIOUS COMORBIDITY WITH BODY MASS INDEX (BMI) OF 36.0 TO 36.9 IN ADULT: Chronic | ICD-10-CM

## 2022-11-03 DIAGNOSIS — K21.9 GASTROESOPHAGEAL REFLUX DISEASE WITHOUT ESOPHAGITIS: Chronic | ICD-10-CM

## 2022-11-03 DIAGNOSIS — G47.09 OTHER INSOMNIA: Chronic | ICD-10-CM

## 2022-11-03 DIAGNOSIS — I10 PRIMARY HYPERTENSION: Primary | Chronic | ICD-10-CM

## 2022-11-03 DIAGNOSIS — J30.89 SEASONAL ALLERGIC RHINITIS DUE TO OTHER ALLERGIC TRIGGER: Chronic | ICD-10-CM

## 2022-11-03 PROBLEM — E66.812 CLASS 2 OBESITY DUE TO EXCESS CALORIES WITHOUT SERIOUS COMORBIDITY WITH BODY MASS INDEX (BMI) OF 36.0 TO 36.9 IN ADULT: Chronic | Status: ACTIVE | Noted: 2022-11-03

## 2022-11-03 PROCEDURE — 3074F PR MOST RECENT SYSTOLIC BLOOD PRESSURE < 130 MM HG: ICD-10-PCS | Mod: ,,, | Performed by: FAMILY MEDICINE

## 2022-11-03 PROCEDURE — 3074F SYST BP LT 130 MM HG: CPT | Mod: ,,, | Performed by: FAMILY MEDICINE

## 2022-11-03 PROCEDURE — 3044F HG A1C LEVEL LT 7.0%: CPT | Mod: ,,, | Performed by: FAMILY MEDICINE

## 2022-11-03 PROCEDURE — 3078F PR MOST RECENT DIASTOLIC BLOOD PRESSURE < 80 MM HG: ICD-10-PCS | Mod: ,,, | Performed by: FAMILY MEDICINE

## 2022-11-03 PROCEDURE — 99214 PR OFFICE/OUTPT VISIT, EST, LEVL IV, 30-39 MIN: ICD-10-PCS | Mod: ,,, | Performed by: FAMILY MEDICINE

## 2022-11-03 PROCEDURE — 3078F DIAST BP <80 MM HG: CPT | Mod: ,,, | Performed by: FAMILY MEDICINE

## 2022-11-03 PROCEDURE — 3008F BODY MASS INDEX DOCD: CPT | Mod: ,,, | Performed by: FAMILY MEDICINE

## 2022-11-03 PROCEDURE — 82570 ASSAY OF URINE CREATININE: CPT | Mod: ,,, | Performed by: CLINICAL MEDICAL LABORATORY

## 2022-11-03 PROCEDURE — 3008F PR BODY MASS INDEX (BMI) DOCUMENTED: ICD-10-PCS | Mod: ,,, | Performed by: FAMILY MEDICINE

## 2022-11-03 PROCEDURE — 1160F PR REVIEW ALL MEDS BY PRESCRIBER/CLIN PHARMACIST DOCUMENTED: ICD-10-PCS | Mod: ,,, | Performed by: FAMILY MEDICINE

## 2022-11-03 PROCEDURE — 1159F MED LIST DOCD IN RCRD: CPT | Mod: ,,, | Performed by: FAMILY MEDICINE

## 2022-11-03 PROCEDURE — 3066F NEPHROPATHY DOC TX: CPT | Mod: ,,, | Performed by: FAMILY MEDICINE

## 2022-11-03 PROCEDURE — 3061F NEG MICROALBUMINURIA REV: CPT | Mod: ,,, | Performed by: FAMILY MEDICINE

## 2022-11-03 PROCEDURE — 80061 LIPID PANEL: CPT | Mod: ,,, | Performed by: CLINICAL MEDICAL LABORATORY

## 2022-11-03 PROCEDURE — 1160F RVW MEDS BY RX/DR IN RCRD: CPT | Mod: ,,, | Performed by: FAMILY MEDICINE

## 2022-11-03 PROCEDURE — 3066F PR DOCUMENTATION OF TREATMENT FOR NEPHROPATHY: ICD-10-PCS | Mod: ,,, | Performed by: FAMILY MEDICINE

## 2022-11-03 PROCEDURE — 82570 MICROALBUMIN / CREATININE RATIO URINE: ICD-10-PCS | Mod: ,,, | Performed by: CLINICAL MEDICAL LABORATORY

## 2022-11-03 PROCEDURE — 82043 UR ALBUMIN QUANTITATIVE: CPT | Mod: ,,, | Performed by: CLINICAL MEDICAL LABORATORY

## 2022-11-03 PROCEDURE — 3044F PR MOST RECENT HEMOGLOBIN A1C LEVEL <7.0%: ICD-10-PCS | Mod: ,,, | Performed by: FAMILY MEDICINE

## 2022-11-03 PROCEDURE — 80053 COMPREHEN METABOLIC PANEL: CPT | Mod: ,,, | Performed by: CLINICAL MEDICAL LABORATORY

## 2022-11-03 PROCEDURE — 1159F PR MEDICATION LIST DOCUMENTED IN MEDICAL RECORD: ICD-10-PCS | Mod: ,,, | Performed by: FAMILY MEDICINE

## 2022-11-03 PROCEDURE — 99214 OFFICE O/P EST MOD 30 MIN: CPT | Mod: ,,, | Performed by: FAMILY MEDICINE

## 2022-11-03 PROCEDURE — 3061F PR NEG MICROALBUMINURIA RESULT DOCUMENTED/REVIEW: ICD-10-PCS | Mod: ,,, | Performed by: FAMILY MEDICINE

## 2022-11-03 PROCEDURE — 82043 MICROALBUMIN / CREATININE RATIO URINE: ICD-10-PCS | Mod: ,,, | Performed by: CLINICAL MEDICAL LABORATORY

## 2022-11-03 PROCEDURE — 80053 COMPREHENSIVE METABOLIC PANEL: ICD-10-PCS | Mod: ,,, | Performed by: CLINICAL MEDICAL LABORATORY

## 2022-11-03 PROCEDURE — 80061 LIPID PANEL: ICD-10-PCS | Mod: ,,, | Performed by: CLINICAL MEDICAL LABORATORY

## 2022-11-03 RX ORDER — SEMAGLUTIDE 1.34 MG/ML
0.25 INJECTION, SOLUTION SUBCUTANEOUS WEEKLY
COMMUNITY
Start: 2022-10-03 | End: 2022-11-03 | Stop reason: SDUPTHER

## 2022-11-03 RX ORDER — SEMAGLUTIDE 1.34 MG/ML
0.25 INJECTION, SOLUTION SUBCUTANEOUS WEEKLY
Qty: 1 PEN | Refills: 0 | Status: SHIPPED | OUTPATIENT
Start: 2022-11-03 | End: 2022-12-12 | Stop reason: SDUPTHER

## 2022-11-03 RX ORDER — TRAZODONE HYDROCHLORIDE 100 MG/1
100 TABLET ORAL NIGHTLY
Qty: 90 TABLET | Refills: 1 | Status: SHIPPED | OUTPATIENT
Start: 2022-11-03 | End: 2023-07-24 | Stop reason: SDUPTHER

## 2022-11-03 RX ORDER — CETIRIZINE HYDROCHLORIDE 10 MG/1
10 TABLET ORAL DAILY
Qty: 90 TABLET | Refills: 1 | Status: SHIPPED | OUTPATIENT
Start: 2022-11-03 | End: 2023-05-03 | Stop reason: SDUPTHER

## 2022-11-03 RX ORDER — PANTOPRAZOLE SODIUM 40 MG/1
40 TABLET, DELAYED RELEASE ORAL DAILY
Qty: 90 TABLET | Refills: 1 | Status: SHIPPED | OUTPATIENT
Start: 2022-11-03 | End: 2023-03-28 | Stop reason: SDUPTHER

## 2022-11-03 RX ORDER — PHENTERMINE HYDROCHLORIDE 37.5 MG/1
37.5 TABLET ORAL
Qty: 30 TABLET | Refills: 0 | Status: SHIPPED | OUTPATIENT
Start: 2022-11-03 | End: 2022-12-03

## 2022-11-03 RX ORDER — HYDROCHLOROTHIAZIDE 25 MG/1
25 TABLET ORAL DAILY
Qty: 90 TABLET | Refills: 1 | Status: SHIPPED | OUTPATIENT
Start: 2022-11-03 | End: 2023-01-12 | Stop reason: SDUPTHER

## 2022-11-03 NOTE — PROGRESS NOTES
Subjective     Patient ID: Marcell Tinoco is a 52 y.o. female.    Chief Complaint: Color Me Healthy    51 yo AAF presents to clinic today with [facility] Color Me Healthy (CM) visit for HTN.  Patient has a PMH significant for HTN, GERD, chronic constipation, insomnia, seasonal allergies, and obesity.  Reports chronic issues are well controlled on current medication regimen.  Denies any problems or side effects with medications.  Patient is, otherwise, without complaints.  Review of Systems   Constitutional:  Negative for appetite change, chills, fatigue, fever and unexpected weight change.   Eyes:  Negative for visual disturbance.   Respiratory:  Negative for cough and shortness of breath.    Cardiovascular:  Negative for chest pain and leg swelling.   Gastrointestinal:  Negative for abdominal pain, change in bowel habit, constipation, diarrhea, nausea, vomiting and change in bowel habit.   Musculoskeletal:  Negative for arthralgias.   Integumentary:  Negative for rash.   Neurological:  Negative for dizziness and headaches.   Psychiatric/Behavioral:  The patient is not nervous/anxious.      Tobacco Use: Low Risk     Smoking Tobacco Use: Never    Smokeless Tobacco Use: Never    Passive Exposure: Not on file     Review of patient's allergies indicates:   Allergen Reactions    Sulfa (sulfonamide antibiotics)      Current Outpatient Medications   Medication Instructions    cetirizine (ZYRTEC) 10 mg, Oral, Daily    docusate sodium (COLACE) 100 mg, Oral, 2 times daily, For constipation    estradioL (ESTRACE) 1 mg, Oral, Daily    hydroCHLOROthiazide (HYDRODIURIL) 25 mg, Oral, Daily    HYDROcodone-acetaminophen (NORCO) 5-325 mg per tablet 1 tablet, Oral, Every 4-6 hours PRN    LINZESS 145 mcg Cap capsule 1 capsule, Oral, Daily    meloxicam (MOBIC) 7.5 mg, Oral, Daily    nystatin (MYCOSTATIN) 100,000 unit/mL suspension Swish and spit 5 mL by mouth 4 X daily X 10 days    OZEMPIC 0.25 mg, Subcutaneous, Weekly    pantoprazole  "(PROTONIX) 40 mg, Oral, Daily    phentermine (ADIPEX-P) 37.5 mg, Oral, Before breakfast    potassium chloride (K-TAB) 20 mEq 20 mEq, Oral, Daily    traZODone (DESYREL) 100 mg, Oral, Nightly     Medications Discontinued During This Encounter   Medication Reason    ergocalciferol (ERGOCALCIFEROL) 50,000 unit Cap Patient no longer taking    fluconazole (DIFLUCAN) 150 MG Tab Patient no longer taking    hydrOXYzine pamoate (VISTARIL) 25 MG Cap Patient no longer taking    metroNIDAZOLE (FLAGYL) 500 MG tablet Patient no longer taking    mupirocin (BACTROBAN) 2 % ointment Patient no longer taking    phentermine (ADIPEX-P) 37.5 mg tablet Patient no longer taking    topiramate (TOPAMAX) 100 MG tablet Patient no longer taking    polyethylene glycol (GLYCOLAX) 17 gram/dose powder Patient no longer taking    TRULICITY 1.5 mg/0.5 mL pen injector Patient no longer taking    pantoprazole (PROTONIX) 40 MG tablet Reorder    traZODone (DESYREL) 100 MG tablet Reorder    cetirizine (ZYRTEC) 10 MG tablet Reorder    hydroCHLOROthiazide (HYDRODIURIL) 25 MG tablet Reorder    OZEMPIC 0.25 mg or 0.5 mg(2 mg/1.5 mL) pen injector Reorder       Past Medical History:   Diagnosis Date    Arthritis     History of rectal polyps 08/07/2018     Health Maintenance Topics with due status: Not Due       Topic Last Completion Date    Colorectal Cancer Screening 08/07/2018    Cervical Cancer Screening 11/01/2021    Mammogram 01/14/2022    Lipid Panel 03/28/2022     Immunization History   Administered Date(s) Administered    COVID-19, MRNA, LN-S, PF (Pfizer) (Purple Cap) 02/23/2021, 03/16/2021, 10/22/2021    MMR 02/04/2020    Zoster Recombinant 08/02/2022, 10/05/2022       Objective     Body mass index is 36.38 kg/m².  Wt Readings from Last 3 Encounters:   11/03/22 102.2 kg (225 lb 6.4 oz)   06/28/22 98.1 kg (216 lb 3.2 oz)   03/28/22 97.2 kg (214 lb 3.2 oz)     Ht Readings from Last 3 Encounters:   11/03/22 5' 6" (1.676 m)   06/28/22 5' 7" (1.702 m) " "  03/28/22 5' 7" (1.702 m)     BP Readings from Last 3 Encounters:   11/03/22 112/76   06/28/22 127/82   03/28/22 118/80     Temp Readings from Last 3 Encounters:   11/03/22 98.4 °F (36.9 °C) (Oral)   06/28/22 97.5 °F (36.4 °C) (Oral)   03/28/22 97.8 °F (36.6 °C) (Temporal)     Pulse Readings from Last 3 Encounters:   11/03/22 85   06/28/22 86   03/28/22 70     Resp Readings from Last 3 Encounters:   11/03/22 20   06/28/22 18   03/28/22 20     PF Readings from Last 3 Encounters:   No data found for PF       Physical Exam  Constitutional:       General: She is not in acute distress.     Appearance: Normal appearance. She is obese.   HENT:      Nose: Nose normal.      Mouth/Throat:      Mouth: Mucous membranes are moist.      Pharynx: Oropharynx is clear.   Eyes:      Conjunctiva/sclera: Conjunctivae normal.   Cardiovascular:      Rate and Rhythm: Normal rate and regular rhythm.      Heart sounds: Normal heart sounds. No murmur heard.  Pulmonary:      Effort: Pulmonary effort is normal. No respiratory distress.      Breath sounds: Normal breath sounds. No wheezing, rhonchi or rales.   Abdominal:      General: Bowel sounds are normal.      Palpations: Abdomen is soft.      Tenderness: There is no abdominal tenderness.   Musculoskeletal:      Cervical back: Neck supple.   Skin:     Findings: No rash.   Neurological:      General: No focal deficit present.      Mental Status: She is alert. Mental status is at baseline.   Psychiatric:         Mood and Affect: Mood normal.       Assessment and Plan   Marcell was seen today for color me healthy.    Diagnoses and all orders for this visit:    Primary hypertension  -     hydroCHLOROthiazide (HYDRODIURIL) 25 MG tablet; Take 1 tablet (25 mg total) by mouth once daily.  -     Microalbumin/Creatinine Ratio, Urine; Future  -     Lipid Panel; Future  -     Comprehensive Metabolic Panel; Future    Other insomnia  -     traZODone (DESYREL) 100 MG tablet; Take 1 tablet (100 mg " total) by mouth every evening.    Seasonal allergic rhinitis due to other allergic trigger  -     cetirizine (ZYRTEC) 10 MG tablet; Take 1 tablet (10 mg total) by mouth once daily.    Gastroesophageal reflux disease without esophagitis  -     pantoprazole (PROTONIX) 40 MG tablet; Take 1 tablet (40 mg total) by mouth once daily.    Class 2 obesity due to excess calories without serious comorbidity with body mass index (BMI) of 36.0 to 36.9 in adult  -     OZEMPIC 0.25 mg or 0.5 mg(2 mg/1.5 mL) pen injector; Inject 0.25 mg into the skin once a week.  -     phentermine (ADIPEX-P) 37.5 mg tablet; Take 1 tablet (37.5 mg total) by mouth before breakfast.      Problem List Items Addressed This Visit          ENT    Seasonal allergic rhinitis (Chronic)    Relevant Medications    cetirizine (ZYRTEC) 10 MG tablet       Cardiac/Vascular    Primary hypertension - Primary (Chronic)    Relevant Medications    hydroCHLOROthiazide (HYDRODIURIL) 25 MG tablet    Other Relevant Orders    Microalbumin/Creatinine Ratio, Urine    Lipid Panel    Comprehensive Metabolic Panel       Endocrine    Class 2 obesity due to excess calories without serious comorbidity with body mass index (BMI) of 36.0 to 36.9 in adult (Chronic)    Relevant Medications    OZEMPIC 0.25 mg or 0.5 mg(2 mg/1.5 mL) pen injector    phentermine (ADIPEX-P) 37.5 mg tablet       GI    Gastroesophageal reflux disease without esophagitis (Chronic)    Relevant Medications    pantoprazole (PROTONIX) 40 MG tablet       Other    Other insomnia (Chronic)    Relevant Medications    traZODone (DESYREL) 100 MG tablet       Plan: RTC in 6 months for chronic follow up.  Follow up with Weight Management as scheduled.      I have reviewed the medications, allergies, and problem list.     Goal Actions:    What type of visit is the patient here for today?: Healthy You  Is this a Wellness Follow Up?: Yes  What is your overall wellness goal? (select at least one): Improve overall  health  Choose 3: Biometric, Lifestyle, Nutrition  Biometric Actions: Attend regularly scheduled office visits  Lifestyle Actions : Take medications as prescribed  Nurtrition Actions: Eat a well-balanced diet

## 2022-11-03 NOTE — PATIENT INSTRUCTIONS
"Patient Education       High Blood Pressure in Adults   The Basics   Written by the doctors and editors at [facility]   What is high blood pressure? -- High blood pressure is a condition that puts you at risk for heart attack, stroke, and kidney disease. It does not usually cause symptoms. But it can be serious.  When your doctor or nurse tells you your blood pressure, they will say 2 numbers. For instance, your doctor or nurse might say that your blood pressure is "130 over 80." The top number is the pressure inside your arteries when your heart is seun. The bottom number is the pressure inside your arteries when your heart is relaxed.  "Elevated blood pressure" is a term doctors or nurses use as a warning. People with elevated blood pressure do not yet have high blood pressure. But their blood pressure is not as low as it should be for good health.  Many experts define high, elevated, and normal blood pressure as follows:  High - Top number of 130 or above and/or bottom number of 80 or above  Elevated - Top number between 120 and 129 and bottom number of 79 or below  Normal - Top number of 119 or below and bottom number of 79 or below  This information is also in the table (table 1).   How can I lower my blood pressure? -- If your doctor or nurse has prescribed blood pressure medicine, the most important thing you can do is to take it. If it causes side effects, do not just stop taking it. Instead, talk to your doctor or nurse about the problems it causes. They might be able to lower your dose or switch you to another medicine. If cost is a problem, mention that too. They might be able to put you on a less expensive medicine. Taking your blood pressure medicine can keep you from having a heart attack or stroke, and it can save your life!  Can I do anything on my own? -- You have a lot of control over your blood pressure. To lower it:  Lose weight (if you are overweight)  Choose a diet low in fat and rich in " "fruits, vegetables, and low-fat dairy products  Reduce the amount of salt you eat  Do something active for at least 30 minutes a day on most days of the week  Cut down on alcohol (if you drink more than 2 alcoholic drinks per day)  It's also a good idea to get a home blood pressure meter. People who check their own blood pressure at home do better at keeping it low and can sometimes even reduce the amount of medicine they take.  All topics are updated as new evidence becomes available and our peer review process is complete.  This topic retrieved from [employer] on: Sep 21, 2021.  Topic 95391 Version 15.0  Release: 29.4.2 - C29.263  © 2021 UpToDate, Inc. and/or its affiliates. All rights reserved.  table 1: Definition of normal and high blood pressure  Level  Top number  Bottom number    High 130 or above 80 or above   Elevated 120 to 129 79 or below   Normal 119 or below 79 or below   These definitions are from the American College of Cardiology/American Heart Association. Other expert groups might use slightly different definitions.  "Elevated blood pressure" is a term doctor or nurses use as a warning. It means you do not yet have high blood pressure, but your blood pressure is not as low as it should be for good health.  Graphic 67424 Version 6.0  Consumer Information Use and Disclaimer   This information is not specific medical advice and does not replace information you receive from your health care provider. This is only a brief summary of general information. It does NOT include all information about conditions, illnesses, injuries, tests, procedures, treatments, therapies, discharge instructions or life-style choices that may apply to you. You must talk with your health care provider for complete information about your health and treatment options. This information should not be used to decide whether or not to accept your health care provider's advice, instructions or recommendations. Only your health care " provider has the knowledge and training to provide advice that is right for you. The use of this information is governed by the [employer] End User License Agreement, available at https://www.[employer]/en/solutions/[address] [employer]/about/yung.The use of [employer] content is governed by the [employer] Terms of Use. ©2021 UpToDate, Inc. All rights reserved.  Copyright   © 2021 UpToDate, Inc. and/or its affiliates. All rights reserved.

## 2022-11-04 LAB
ALBUMIN SERPL BCP-MCNC: 3.5 G/DL (ref 3.5–5)
ALBUMIN/GLOB SERPL: 0.8 {RATIO}
ALP SERPL-CCNC: 108 U/L (ref 41–108)
ALT SERPL W P-5'-P-CCNC: 27 U/L (ref 13–56)
ANION GAP SERPL CALCULATED.3IONS-SCNC: 9 MMOL/L (ref 7–16)
AST SERPL W P-5'-P-CCNC: 27 U/L (ref 15–37)
BILIRUB SERPL-MCNC: 0.4 MG/DL (ref ?–1.2)
BUN SERPL-MCNC: 10 MG/DL (ref 7–18)
BUN/CREAT SERPL: 10 (ref 6–20)
CALCIUM SERPL-MCNC: 9.3 MG/DL (ref 8.5–10.1)
CHLORIDE SERPL-SCNC: 103 MMOL/L (ref 98–107)
CHOLEST SERPL-MCNC: 144 MG/DL (ref 0–200)
CHOLEST/HDLC SERPL: 1.8 {RATIO}
CO2 SERPL-SCNC: 31 MMOL/L (ref 21–32)
CREAT SERPL-MCNC: 1.01 MG/DL (ref 0.55–1.02)
CREAT UR-MCNC: 56 MG/DL (ref 28–219)
EGFR (NO RACE VARIABLE) (RUSH/TITUS): 66 ML/MIN/1.73M²
GLOBULIN SER-MCNC: 4.2 G/DL (ref 2–4)
GLUCOSE SERPL-MCNC: 70 MG/DL (ref 74–106)
HDLC SERPL-MCNC: 82 MG/DL (ref 40–60)
LDLC SERPL CALC-MCNC: 48 MG/DL
MICROALBUMIN UR-MCNC: <0.5 MG/DL (ref 0–2.8)
MICROALBUMIN/CREAT RATIO PNL UR: <8.9 MG/G (ref 0–30)
NONHDLC SERPL-MCNC: 62 MG/DL
POTASSIUM SERPL-SCNC: 3.2 MMOL/L (ref 3.5–5.1)
PROT SERPL-MCNC: 7.7 G/DL (ref 6.4–8.2)
SODIUM SERPL-SCNC: 140 MMOL/L (ref 136–145)
TRIGL SERPL-MCNC: 72 MG/DL (ref 35–150)
VLDLC SERPL-MCNC: 14 MG/DL

## 2022-11-08 NOTE — PROGRESS NOTES
Patient notified of lab results advised to repeat labs in two weeks, patient has not been taking potassium, but will start.

## 2022-12-12 DIAGNOSIS — E66.09 CLASS 2 OBESITY DUE TO EXCESS CALORIES WITHOUT SERIOUS COMORBIDITY WITH BODY MASS INDEX (BMI) OF 36.0 TO 36.9 IN ADULT: Chronic | ICD-10-CM

## 2022-12-12 RX ORDER — SEMAGLUTIDE 1.34 MG/ML
0.25 INJECTION, SOLUTION SUBCUTANEOUS WEEKLY
Qty: 1 PEN | Refills: 0 | Status: SHIPPED | OUTPATIENT
Start: 2022-12-12 | End: 2022-12-23 | Stop reason: CLARIF

## 2022-12-12 NOTE — TELEPHONE ENCOUNTER
----- Message from Bianca Holland sent at 12/12/2022 10:21 AM CST -----  Regarding: call back  Pt would like for you to give her a call back

## 2022-12-23 ENCOUNTER — HOSPITAL ENCOUNTER (EMERGENCY)
Facility: HOSPITAL | Age: 52
Discharge: HOME | End: 2022-12-23
Payer: COMMERCIAL

## 2022-12-23 VITALS
WEIGHT: 220 LBS | HEIGHT: 66 IN | OXYGEN SATURATION: 97 % | BODY MASS INDEX: 35.36 KG/M2 | RESPIRATION RATE: 18 BRPM | HEART RATE: 81 BPM | DIASTOLIC BLOOD PRESSURE: 90 MMHG | SYSTOLIC BLOOD PRESSURE: 144 MMHG | TEMPERATURE: 98 F

## 2022-12-23 DIAGNOSIS — M62.838 NECK MUSCLE SPASM: Primary | ICD-10-CM

## 2022-12-23 PROCEDURE — 99284 EMERGENCY DEPT VISIT MOD MDM: CPT

## 2022-12-23 PROCEDURE — 96372 THER/PROPH/DIAG INJ SC/IM: CPT | Performed by: NURSE PRACTITIONER

## 2022-12-23 PROCEDURE — 63600175 PHARM REV CODE 636 W HCPCS: Performed by: NURSE PRACTITIONER

## 2022-12-23 PROCEDURE — 99283 EMERGENCY DEPT VISIT LOW MDM: CPT | Mod: ,,, | Performed by: NURSE PRACTITIONER

## 2022-12-23 PROCEDURE — 99283 PR EMERGENCY DEPT VISIT,LEVEL III: ICD-10-PCS | Mod: ,,, | Performed by: NURSE PRACTITIONER

## 2022-12-23 RX ORDER — TIZANIDINE HYDROCHLORIDE 2 MG/1
2 CAPSULE, GELATIN COATED ORAL EVERY 8 HOURS PRN
Qty: 20 CAPSULE | Refills: 0 | Status: SHIPPED | OUTPATIENT
Start: 2022-12-23 | End: 2023-01-02

## 2022-12-23 RX ORDER — ORPHENADRINE CITRATE 30 MG/ML
60 INJECTION INTRAMUSCULAR; INTRAVENOUS
Status: COMPLETED | OUTPATIENT
Start: 2022-12-23 | End: 2022-12-23

## 2022-12-23 RX ORDER — KETOROLAC TROMETHAMINE 30 MG/ML
30 INJECTION, SOLUTION INTRAMUSCULAR; INTRAVENOUS
Status: COMPLETED | OUTPATIENT
Start: 2022-12-23 | End: 2022-12-23

## 2022-12-23 RX ORDER — IBUPROFEN 600 MG/1
600 TABLET ORAL EVERY 6 HOURS PRN
Qty: 20 TABLET | Refills: 0 | Status: SHIPPED | OUTPATIENT
Start: 2022-12-23 | End: 2023-05-03

## 2022-12-23 RX ADMIN — ORPHENADRINE CITRATE 60 MG: 60 INJECTION INTRAMUSCULAR; INTRAVENOUS at 12:12

## 2022-12-23 RX ADMIN — KETOROLAC TROMETHAMINE 30 MG: 30 INJECTION, SOLUTION INTRAMUSCULAR; INTRAVENOUS at 12:12

## 2022-12-23 NOTE — DISCHARGE INSTRUCTIONS
Follow up with your primary care provider in 24 to 48 hours if worsening or not improving. Return to the ER as needed.

## 2022-12-23 NOTE — Clinical Note
"Marcell Restrepojenna" Alejandrina was seen and treated in our emergency department on 12/23/2022.  She may return to work on 12/26/2022.       If you have any questions or concerns, please don't hesitate to call.      Haider Ziegler, FNP RN    "

## 2022-12-23 NOTE — Clinical Note
Guillermo Gracia accompanied their spouse to the emergency department on 12/23/2022. They may return to work on 12/23/2022.      If you have any questions or concerns, please don't hesitate to call.      Haider Ziegler, ESTIVENP

## 2022-12-23 NOTE — ED PROVIDER NOTES
Encounter Date: 12/23/2022       History     Chief Complaint   Patient presents with    Neck Pain     Right side neck spasms started thurs morn and saw chiropractor yest     Patient complains of right neck muscle spasms onset yesterday.  Denies pain between spasms.  Denies injury.  Saw her chiropractor yesterday.    Review of patient's allergies indicates:   Allergen Reactions    Sulfa (sulfonamide antibiotics)      Past Medical History:   Diagnosis Date    Arthritis     GERD (gastroesophageal reflux disease)     History of rectal polyps 08/07/2018     Past Surgical History:   Procedure Laterality Date    breast reduction      CHOLECYSTECTOMY  10/28/2013    Dr. Paige    COLONOSCOPY  2018    gastric sleeve      HYSTERECTOMY  10/28/2013    Robotic, BSO,Cystoscopy, TOT-    OOPHORECTOMY      TUBAL LIGATION      philip gamez       Family History   Problem Relation Age of Onset    Colon cancer Sister      Social History     Tobacco Use    Smoking status: Never    Smokeless tobacco: Never   Substance Use Topics    Alcohol use: Not Currently    Drug use: Never     Review of Systems   Respiratory: Negative.     Cardiovascular: Negative.    Musculoskeletal:  Positive for neck pain.   Neurological: Negative.      Physical Exam     Initial Vitals [12/23/22 1208]   BP Pulse Resp Temp SpO2   (!) 144/90 81 18 97.9 °F (36.6 °C) 97 %      MAP       --         Physical Exam    Nursing note and vitals reviewed.  Constitutional: No distress.   HENT:   Head: Normocephalic and atraumatic.   Eyes: EOM are normal.   Neck: Neck supple.   No cervical spinal or paraspinal tenderness   Cardiovascular:  Normal rate.           Pulmonary/Chest: No respiratory distress.   Musculoskeletal:      Cervical back: Neck supple.     Neurological: She is alert. GCS score is 15. GCS eye subscore is 4. GCS verbal subscore is 5. GCS motor subscore is 6.   Skin: Skin is warm and dry. Capillary refill takes less than 2 seconds.       Medical Screening Exam    See Full Note    ED Course   Procedures  Labs Reviewed - No data to display       Imaging Results    None          Medications   ketorolac injection 30 mg (has no administration in time range)   orphenadrine injection 60 mg (has no administration in time range)                       Clinical Impression:   Final diagnoses:  [M62.838] Neck muscle spasm (Primary)        ED Disposition Condition    Discharge Stable          ED Prescriptions       Medication Sig Dispense Start Date End Date Auth. Provider    ibuprofen (ADVIL,MOTRIN) 600 MG tablet Take 1 tablet (600 mg total) by mouth every 6 (six) hours as needed for Pain. 20 tablet 12/23/2022 -- BEE Hansen    tiZANidine 2 mg Cap Take 1 capsule (2 mg total) by mouth every 8 (eight) hours as needed (muscle spasm). 20 capsule 12/23/2022 1/2/2023 BEE Hansen          Follow-up Information    None          BEE Hansen  12/23/22 1242

## 2023-03-01 ENCOUNTER — OFFICE VISIT (OUTPATIENT)
Dept: OBSTETRICS AND GYNECOLOGY | Facility: CLINIC | Age: 53
End: 2023-03-01
Payer: COMMERCIAL

## 2023-03-01 VITALS
HEIGHT: 66 IN | BODY MASS INDEX: 37.12 KG/M2 | WEIGHT: 231 LBS | DIASTOLIC BLOOD PRESSURE: 82 MMHG | SYSTOLIC BLOOD PRESSURE: 124 MMHG | OXYGEN SATURATION: 98 %

## 2023-03-01 DIAGNOSIS — Z12.72 SPECIAL SCREENING FOR MALIGNANT NEOPLASMS, VAGINA: ICD-10-CM

## 2023-03-01 DIAGNOSIS — N76.0 BV (BACTERIAL VAGINOSIS): ICD-10-CM

## 2023-03-01 DIAGNOSIS — B96.89 BV (BACTERIAL VAGINOSIS): ICD-10-CM

## 2023-03-01 DIAGNOSIS — Z01.419 WOMEN'S ANNUAL ROUTINE GYNECOLOGICAL EXAMINATION: Primary | ICD-10-CM

## 2023-03-01 PROCEDURE — 99402 PREV MED CNSL INDIV APPRX 30: CPT | Mod: S$PBB,,, | Performed by: OBSTETRICS & GYNECOLOGY

## 2023-03-01 PROCEDURE — 99402 PR PREVENT COUNSEL,INDIV,30 MIN: ICD-10-PCS | Mod: S$PBB,,, | Performed by: OBSTETRICS & GYNECOLOGY

## 2023-03-01 PROCEDURE — 1159F MED LIST DOCD IN RCRD: CPT | Mod: ,,, | Performed by: OBSTETRICS & GYNECOLOGY

## 2023-03-01 PROCEDURE — 3008F PR BODY MASS INDEX (BMI) DOCUMENTED: ICD-10-PCS | Mod: ,,, | Performed by: OBSTETRICS & GYNECOLOGY

## 2023-03-01 PROCEDURE — 3074F SYST BP LT 130 MM HG: CPT | Mod: ,,, | Performed by: OBSTETRICS & GYNECOLOGY

## 2023-03-01 PROCEDURE — 88142 CYTOPATH C/V THIN LAYER: CPT | Mod: TC,GCY | Performed by: OBSTETRICS & GYNECOLOGY

## 2023-03-01 PROCEDURE — 3008F BODY MASS INDEX DOCD: CPT | Mod: ,,, | Performed by: OBSTETRICS & GYNECOLOGY

## 2023-03-01 PROCEDURE — 1160F RVW MEDS BY RX/DR IN RCRD: CPT | Mod: ,,, | Performed by: OBSTETRICS & GYNECOLOGY

## 2023-03-01 PROCEDURE — 3074F PR MOST RECENT SYSTOLIC BLOOD PRESSURE < 130 MM HG: ICD-10-PCS | Mod: ,,, | Performed by: OBSTETRICS & GYNECOLOGY

## 2023-03-01 PROCEDURE — 3079F PR MOST RECENT DIASTOLIC BLOOD PRESSURE 80-89 MM HG: ICD-10-PCS | Mod: ,,, | Performed by: OBSTETRICS & GYNECOLOGY

## 2023-03-01 PROCEDURE — 1159F PR MEDICATION LIST DOCUMENTED IN MEDICAL RECORD: ICD-10-PCS | Mod: ,,, | Performed by: OBSTETRICS & GYNECOLOGY

## 2023-03-01 PROCEDURE — 3079F DIAST BP 80-89 MM HG: CPT | Mod: ,,, | Performed by: OBSTETRICS & GYNECOLOGY

## 2023-03-01 PROCEDURE — 99214 OFFICE O/P EST MOD 30 MIN: CPT | Mod: PBBFAC | Performed by: OBSTETRICS & GYNECOLOGY

## 2023-03-01 PROCEDURE — 1160F PR REVIEW ALL MEDS BY PRESCRIBER/CLIN PHARMACIST DOCUMENTED: ICD-10-PCS | Mod: ,,, | Performed by: OBSTETRICS & GYNECOLOGY

## 2023-03-01 RX ORDER — METRONIDAZOLE 500 MG/1
500 TABLET ORAL 2 TIMES DAILY
Qty: 14 TABLET | Refills: 0 | Status: SHIPPED | OUTPATIENT
Start: 2023-03-01 | End: 2023-03-08

## 2023-03-01 RX ORDER — FLUCONAZOLE 200 MG/1
200 TABLET ORAL DAILY
Qty: 3 TABLET | Refills: 0 | Status: SHIPPED | OUTPATIENT
Start: 2023-03-01 | End: 2023-03-04

## 2023-03-01 RX ORDER — FLUCONAZOLE 100 MG/1
200 TABLET ORAL DAILY
Qty: 3 TABLET | Refills: 0 | Status: SHIPPED | OUTPATIENT
Start: 2023-03-01 | End: 2023-03-01 | Stop reason: CLARIF

## 2023-03-01 NOTE — PROGRESS NOTES
Marcell Tinoco-[place]water female  for   Chief Complaint   Patient presents with    Annual Exam     Patient is here for routine annual.      OB History          5    Para   4    Term                AB   1    Living             SAB        IAB        Ectopic        Multiple        Live Births                      Past Medical History:   Diagnosis Date    Arthritis     GERD (gastroesophageal reflux disease)     History of rectal polyps 2018      Past Surgical History:   Procedure Laterality Date    breast reduction      CHOLECYSTECTOMY  10/28/2013    Dr. Paige    COLONOSCOPY  2018    gastric sleeve      HYSTERECTOMY  10/28/2013    Robotic, BSO,Cystoscopy, TOT-    OOPHORECTOMY      TUBAL LIGATION      tummy tuck        Review of patient's allergies indicates:   Allergen Reactions    Sulfa (sulfonamide antibiotics)              Physical exam:     General Appearance: healthy    Chest:chest clear, no wheezing, rales, normal symmetric air entry, Heart exam - S1, S2 normal, no murmur, no gallop, rate regular    Breast:  normal appearance, no masses or tenderness    Abdomen:Normal, benign.    Pelvic:   Vulva :  Normal vulva  Vagina: normal vagina, no discharge, exudate, lesion, or erythema  Uterus:  cervix is absent; adnexa not palpable    Rectal: normal and Hemoccult negative  Extremity:normal    Skin: normal exam        Assessment:   Problem List Items Addressed This Visit    None  Visit Diagnoses       Women's annual routine gynecological examination    -  Primary    Relevant Orders    ThinPrep Pap Test    Special screening for malignant neoplasms, vagina        Relevant Orders    ThinPrep Pap Test    BV (bacterial vaginosis)        Relevant Medications    metroNIDAZOLE (FLAGYL) 500 MG tablet    fluconazole (DIFLUCAN) 100 MG tablet             Plan:  The patient has a mammogram scheduled for Friday.  She is going to have another colonoscopy this year.  A Pap smear was done today.  The patient  does have a bacterial vaginitis and was treated with Flagyl and Diflucan.  She was also given prescription for testosterone cream for her decreased libido instructed on its use.  Pros and cons were discussed.

## 2023-03-03 LAB
BCS RECOMMENDATION EXT: NORMAL
GH SERPL-MCNC: NORMAL NG/ML
INSULIN SERPL-ACNC: NORMAL U[IU]/ML
LAB AP CLINICAL INFORMATION: NORMAL
LAB AP GYN INTERPRETATION: NEGATIVE
LAB AP PAP DISCLAIMER COMMENTS: NORMAL
RENIN PLAS-CCNC: NORMAL NG/ML/H

## 2023-03-28 ENCOUNTER — OFFICE VISIT (OUTPATIENT)
Dept: FAMILY MEDICINE | Facility: CLINIC | Age: 53
End: 2023-03-28
Payer: COMMERCIAL

## 2023-03-28 VITALS
HEIGHT: 66 IN | TEMPERATURE: 98 F | WEIGHT: 235.63 LBS | HEART RATE: 73 BPM | SYSTOLIC BLOOD PRESSURE: 110 MMHG | OXYGEN SATURATION: 97 % | BODY MASS INDEX: 37.87 KG/M2 | RESPIRATION RATE: 18 BRPM | DIASTOLIC BLOOD PRESSURE: 80 MMHG

## 2023-03-28 DIAGNOSIS — E66.09 CLASS 2 OBESITY DUE TO EXCESS CALORIES WITHOUT SERIOUS COMORBIDITY WITH BODY MASS INDEX (BMI) OF 36.0 TO 36.9 IN ADULT: Chronic | ICD-10-CM

## 2023-03-28 DIAGNOSIS — Z00.01 ENCOUNTER FOR GENERAL ADULT MEDICAL EXAMINATION WITH ABNORMAL FINDINGS: Primary | ICD-10-CM

## 2023-03-28 DIAGNOSIS — I10 PRIMARY HYPERTENSION: ICD-10-CM

## 2023-03-28 DIAGNOSIS — Z13.1 SCREENING FOR DIABETES MELLITUS: ICD-10-CM

## 2023-03-28 DIAGNOSIS — R60.0 LOCALIZED EDEMA: ICD-10-CM

## 2023-03-28 DIAGNOSIS — F41.9 ANXIETY: ICD-10-CM

## 2023-03-28 DIAGNOSIS — K59.09 CHRONIC CONSTIPATION: Chronic | ICD-10-CM

## 2023-03-28 DIAGNOSIS — Z13.220 SCREENING FOR LIPOID DISORDERS: ICD-10-CM

## 2023-03-28 DIAGNOSIS — R06.02 EXERTIONAL SHORTNESS OF BREATH: ICD-10-CM

## 2023-03-28 DIAGNOSIS — K21.9 GASTROESOPHAGEAL REFLUX DISEASE WITHOUT ESOPHAGITIS: Chronic | ICD-10-CM

## 2023-03-28 DIAGNOSIS — L29.9 ITCHING: ICD-10-CM

## 2023-03-28 LAB
ALBUMIN SERPL BCP-MCNC: 3.5 G/DL (ref 3.5–5)
ALBUMIN/GLOB SERPL: 1 {RATIO}
ALP SERPL-CCNC: 91 U/L (ref 41–108)
ALT SERPL W P-5'-P-CCNC: 28 U/L (ref 13–56)
ANION GAP SERPL CALCULATED.3IONS-SCNC: 9 MMOL/L (ref 7–16)
AST SERPL W P-5'-P-CCNC: 39 U/L (ref 15–37)
BASOPHILS # BLD AUTO: 0.03 K/UL (ref 0–0.2)
BASOPHILS NFR BLD AUTO: 0.7 % (ref 0–1)
BILIRUB SERPL-MCNC: 0.4 MG/DL (ref ?–1.2)
BUN SERPL-MCNC: 10 MG/DL (ref 7–18)
BUN/CREAT SERPL: 11 (ref 6–20)
CALCIUM SERPL-MCNC: 9 MG/DL (ref 8.5–10.1)
CHLORIDE SERPL-SCNC: 102 MMOL/L (ref 98–107)
CHOLEST SERPL-MCNC: 150 MG/DL (ref 0–200)
CHOLEST/HDLC SERPL: 2.1 {RATIO}
CO2 SERPL-SCNC: 32 MMOL/L (ref 21–32)
CREAT SERPL-MCNC: 0.9 MG/DL (ref 0.55–1.02)
DIFFERENTIAL METHOD BLD: ABNORMAL
EGFR (NO RACE VARIABLE) (RUSH/TITUS): 76 ML/MIN/1.73M²
EOSINOPHIL # BLD AUTO: 0.14 K/UL (ref 0–0.5)
EOSINOPHIL NFR BLD AUTO: 3.2 % (ref 1–4)
ERYTHROCYTE [DISTWIDTH] IN BLOOD BY AUTOMATED COUNT: 13.2 % (ref 11.5–14.5)
EST. AVERAGE GLUCOSE BLD GHB EST-MCNC: 77 MG/DL
GLOBULIN SER-MCNC: 3.6 G/DL (ref 2–4)
GLUCOSE SERPL-MCNC: 82 MG/DL (ref 74–106)
GLUCOSE SERPL-MCNC: 82 MG/DL (ref 74–106)
HBA1C MFR BLD HPLC: 4.9 % (ref 4.5–6.6)
HCT VFR BLD AUTO: 37.9 % (ref 38–47)
HDLC SERPL-MCNC: 72 MG/DL (ref 40–60)
HGB BLD-MCNC: 11.7 G/DL (ref 12–16)
IMM GRANULOCYTES # BLD AUTO: 0.01 K/UL (ref 0–0.04)
IMM GRANULOCYTES NFR BLD: 0.2 % (ref 0–0.4)
LDLC SERPL CALC-MCNC: 64 MG/DL
LYMPHOCYTES # BLD AUTO: 1.78 K/UL (ref 1–4.8)
LYMPHOCYTES NFR BLD AUTO: 40.9 % (ref 27–41)
MCH RBC QN AUTO: 30.3 PG (ref 27–31)
MCHC RBC AUTO-ENTMCNC: 30.9 G/DL (ref 32–36)
MCV RBC AUTO: 98.2 FL (ref 80–96)
MONOCYTES # BLD AUTO: 0.4 K/UL (ref 0–0.8)
MONOCYTES NFR BLD AUTO: 9.2 % (ref 2–6)
MPC BLD CALC-MCNC: 12.3 FL (ref 9.4–12.4)
NEUTROPHILS # BLD AUTO: 1.99 K/UL (ref 1.8–7.7)
NEUTROPHILS NFR BLD AUTO: 45.8 % (ref 53–65)
NONHDLC SERPL-MCNC: 78 MG/DL
NRBC # BLD AUTO: 0 X10E3/UL
NRBC, AUTO (.00): 0 %
NT-PROBNP SERPL-MCNC: 37 PG/ML (ref 1–125)
PLATELET # BLD AUTO: 318 K/UL (ref 150–400)
POTASSIUM SERPL-SCNC: 3.2 MMOL/L (ref 3.5–5.1)
PROT SERPL-MCNC: 7.1 G/DL (ref 6.4–8.2)
RBC # BLD AUTO: 3.86 M/UL (ref 4.2–5.4)
SODIUM SERPL-SCNC: 140 MMOL/L (ref 136–145)
TRIGL SERPL-MCNC: 71 MG/DL (ref 35–150)
VLDLC SERPL-MCNC: 14 MG/DL
WBC # BLD AUTO: 4.35 K/UL (ref 4.5–11)

## 2023-03-28 PROCEDURE — 3008F PR BODY MASS INDEX (BMI) DOCUMENTED: ICD-10-PCS | Mod: ,,, | Performed by: FAMILY MEDICINE

## 2023-03-28 PROCEDURE — 99396 PR PREVENTIVE VISIT,EST,40-64: ICD-10-PCS | Mod: ,,, | Performed by: FAMILY MEDICINE

## 2023-03-28 PROCEDURE — 1159F PR MEDICATION LIST DOCUMENTED IN MEDICAL RECORD: ICD-10-PCS | Mod: ,,, | Performed by: FAMILY MEDICINE

## 2023-03-28 PROCEDURE — 1160F PR REVIEW ALL MEDS BY PRESCRIBER/CLIN PHARMACIST DOCUMENTED: ICD-10-PCS | Mod: ,,, | Performed by: FAMILY MEDICINE

## 2023-03-28 PROCEDURE — 80053 COMPREHENSIVE METABOLIC PANEL: ICD-10-PCS | Mod: ,,, | Performed by: CLINICAL MEDICAL LABORATORY

## 2023-03-28 PROCEDURE — 3074F PR MOST RECENT SYSTOLIC BLOOD PRESSURE < 130 MM HG: ICD-10-PCS | Mod: ,,, | Performed by: FAMILY MEDICINE

## 2023-03-28 PROCEDURE — 80061 LIPID PANEL: ICD-10-PCS | Mod: ,,, | Performed by: CLINICAL MEDICAL LABORATORY

## 2023-03-28 PROCEDURE — 85025 COMPLETE CBC W/AUTO DIFF WBC: CPT | Mod: ,,, | Performed by: CLINICAL MEDICAL LABORATORY

## 2023-03-28 PROCEDURE — 82947 ASSAY GLUCOSE BLOOD QUANT: CPT | Mod: 59,,, | Performed by: CLINICAL MEDICAL LABORATORY

## 2023-03-28 PROCEDURE — 83880 ASSAY OF NATRIURETIC PEPTIDE: CPT | Mod: ,,, | Performed by: CLINICAL MEDICAL LABORATORY

## 2023-03-28 PROCEDURE — 1159F MED LIST DOCD IN RCRD: CPT | Mod: ,,, | Performed by: FAMILY MEDICINE

## 2023-03-28 PROCEDURE — 82947 GLUCOSE, FASTING: ICD-10-PCS | Mod: 59,,, | Performed by: CLINICAL MEDICAL LABORATORY

## 2023-03-28 PROCEDURE — 83036 HEMOGLOBIN A1C: ICD-10-PCS | Mod: ,,, | Performed by: CLINICAL MEDICAL LABORATORY

## 2023-03-28 PROCEDURE — 3079F DIAST BP 80-89 MM HG: CPT | Mod: ,,, | Performed by: FAMILY MEDICINE

## 2023-03-28 PROCEDURE — 85025 CBC WITH DIFFERENTIAL: ICD-10-PCS | Mod: ,,, | Performed by: CLINICAL MEDICAL LABORATORY

## 2023-03-28 PROCEDURE — 3074F SYST BP LT 130 MM HG: CPT | Mod: ,,, | Performed by: FAMILY MEDICINE

## 2023-03-28 PROCEDURE — 3008F BODY MASS INDEX DOCD: CPT | Mod: ,,, | Performed by: FAMILY MEDICINE

## 2023-03-28 PROCEDURE — 99396 PREV VISIT EST AGE 40-64: CPT | Mod: ,,, | Performed by: FAMILY MEDICINE

## 2023-03-28 PROCEDURE — 80053 COMPREHEN METABOLIC PANEL: CPT | Mod: ,,, | Performed by: CLINICAL MEDICAL LABORATORY

## 2023-03-28 PROCEDURE — 1160F RVW MEDS BY RX/DR IN RCRD: CPT | Mod: ,,, | Performed by: FAMILY MEDICINE

## 2023-03-28 PROCEDURE — 3079F PR MOST RECENT DIASTOLIC BLOOD PRESSURE 80-89 MM HG: ICD-10-PCS | Mod: ,,, | Performed by: FAMILY MEDICINE

## 2023-03-28 PROCEDURE — 80061 LIPID PANEL: CPT | Mod: ,,, | Performed by: CLINICAL MEDICAL LABORATORY

## 2023-03-28 PROCEDURE — 83036 HEMOGLOBIN GLYCOSYLATED A1C: CPT | Mod: ,,, | Performed by: CLINICAL MEDICAL LABORATORY

## 2023-03-28 PROCEDURE — 83880 NT-PRO NATRIURETIC PEPTIDE: ICD-10-PCS | Mod: ,,, | Performed by: CLINICAL MEDICAL LABORATORY

## 2023-03-28 RX ORDER — SEMAGLUTIDE 1.34 MG/ML
INJECTION, SOLUTION SUBCUTANEOUS
Qty: 1.5 ML | Refills: 5 | Status: SHIPPED | OUTPATIENT
Start: 2023-03-28 | End: 2023-05-09

## 2023-03-28 RX ORDER — HYDROCHLOROTHIAZIDE 25 MG/1
25 TABLET ORAL DAILY
Qty: 90 TABLET | Refills: 1 | Status: SHIPPED | OUTPATIENT
Start: 2023-03-28 | End: 2024-02-20 | Stop reason: SDUPTHER

## 2023-03-28 RX ORDER — FUROSEMIDE 20 MG/1
20 TABLET ORAL DAILY PRN
Qty: 90 TABLET | Refills: 1 | Status: SHIPPED | OUTPATIENT
Start: 2023-03-28 | End: 2023-06-14 | Stop reason: SDUPTHER

## 2023-03-28 RX ORDER — HYDROXYZINE PAMOATE 25 MG/1
25 CAPSULE ORAL EVERY 8 HOURS PRN
Qty: 45 CAPSULE | Refills: 2 | Status: SHIPPED | OUTPATIENT
Start: 2023-03-28 | End: 2023-09-19

## 2023-03-28 RX ORDER — PANTOPRAZOLE SODIUM 40 MG/1
40 TABLET, DELAYED RELEASE ORAL DAILY
Qty: 90 TABLET | Refills: 1 | Status: SHIPPED | OUTPATIENT
Start: 2023-03-28 | End: 2023-09-22

## 2023-03-28 RX ORDER — LINACLOTIDE 145 UG/1
145 CAPSULE, GELATIN COATED ORAL DAILY
Qty: 90 CAPSULE | Refills: 1 | Status: SHIPPED | OUTPATIENT
Start: 2023-03-28 | End: 2023-05-09 | Stop reason: SDUPTHER

## 2023-03-28 NOTE — PATIENT INSTRUCTIONS
"Patient Education       High Blood Pressure in Adults   The Basics   Written by the doctors and editors at [facility]   What is high blood pressure? -- High blood pressure is a condition that puts you at risk for heart attack, stroke, and kidney disease. It does not usually cause symptoms. But it can be serious.  When your doctor or nurse tells you your blood pressure, they will say 2 numbers. For instance, your doctor or nurse might say that your blood pressure is "130 over 80." The top number is the pressure inside your arteries when your heart is seun. The bottom number is the pressure inside your arteries when your heart is relaxed.  "Elevated blood pressure" is a term doctors or nurses use as a warning. People with elevated blood pressure do not yet have high blood pressure. But their blood pressure is not as low as it should be for good health.  Many experts define high, elevated, and normal blood pressure as follows:  High - Top number of 130 or above and/or bottom number of 80 or above  Elevated - Top number between 120 and 129 and bottom number of 79 or below  Normal - Top number of 119 or below and bottom number of 79 or below  This information is also in the table (table 1).   How can I lower my blood pressure? -- If your doctor or nurse has prescribed blood pressure medicine, the most important thing you can do is to take it. If it causes side effects, do not just stop taking it. Instead, talk to your doctor or nurse about the problems it causes. They might be able to lower your dose or switch you to another medicine. If cost is a problem, mention that too. They might be able to put you on a less expensive medicine. Taking your blood pressure medicine can keep you from having a heart attack or stroke, and it can save your life!  Can I do anything on my own? -- You have a lot of control over your blood pressure. To lower it:  Lose weight (if you are overweight)  Choose a diet low in fat and rich in " "fruits, vegetables, and low-fat dairy products  Reduce the amount of salt you eat  Do something active for at least 30 minutes a day on most days of the week  Cut down on alcohol (if you drink more than 2 alcoholic drinks per day)  It's also a good idea to get a home blood pressure meter. People who check their own blood pressure at home do better at keeping it low and can sometimes even reduce the amount of medicine they take.  All topics are updated as new evidence becomes available and our peer review process is complete.  This topic retrieved from [employer] on: Sep 21, 2021.  Topic 70893 Version 15.0  Release: 29.4.2 - C29.263  © 2021 UpToDate, Inc. and/or its affiliates. All rights reserved.  table 1: Definition of normal and high blood pressure  Level  Top number  Bottom number    High 130 or above 80 or above   Elevated 120 to 129 79 or below   Normal 119 or below 79 or below   These definitions are from the American College of Cardiology/American Heart Association. Other expert groups might use slightly different definitions.  "Elevated blood pressure" is a term doctor or nurses use as a warning. It means you do not yet have high blood pressure, but your blood pressure is not as low as it should be for good health.  Graphic 73678 Version 6.0  Consumer Information Use and Disclaimer   This information is not specific medical advice and does not replace information you receive from your health care provider. This is only a brief summary of general information. It does NOT include all information about conditions, illnesses, injuries, tests, procedures, treatments, therapies, discharge instructions or life-style choices that may apply to you. You must talk with your health care provider for complete information about your health and treatment options. This information should not be used to decide whether or not to accept your health care provider's advice, instructions or recommendations. Only your health care " provider has the knowledge and training to provide advice that is right for you. The use of this information is governed by the [employer] End User License Agreement, available at [email or web address]/about/yung.The use of [employer] content is governed by the [employer] Terms of Use. ©2021 UpToDate, Inc. All rights reserved.  Copyright   © 2021 UpToDate, Inc. and/or its affiliates. All rights reserved.    Patient Education       Diet and Health   The Basics   Written by the doctors and editors at [facility]   Why is it important to eat a healthy diet? -- It's important to eat a healthy diet because eating the right foods can keep you healthy now and later on in life.  Which foods are especially healthy? -- Foods that are especially healthy include:  Fruits and vegetables - Eating a diet with lots of fruits and vegetables can help prevent heart disease and strokes. It might also help prevent certain types of cancers. Try to eat fruits and vegetables at each meal and also for snacks. If you don't have fresh fruits and vegetables available, you can eat frozen or canned ones instead. Doctors recommend eating at least 2 1/2 servings of vegetables and 2 servings of fruits each day.  Foods with fiber - Eating foods with a lot of fiber can help prevent heart disease and strokes. If you have type 2 diabetes, it can also help control your blood sugar. Foods that have a lot of fiber include vegetables, fruits, beans, nuts, oatmeal, and whole grain breads and cereals. You can tell how much fiber is in a food by reading the nutrition label (figure 1). Doctors recommend eating 25 to 36 grams of fiber each day.  Foods with folate (also called folic acid) - Folate is a vitamin that is important for pregnant people, since it helps prevent certain birth defects. Anyone who could get pregnant should get at least 400 micrograms of folic acid daily, whether or not they are actively trying to get pregnant. Folate  "is found in many breakfast cereals, oranges, orange juice, and green leafy vegetables.  Foods with calcium and vitamin D - Babies, children, and adults need calcium and vitamin D to help keep their bones strong. Adults also need calcium and vitamin D to help prevent osteoporosis. Osteoporosis is a condition that causes bones to get "thin" and break more easily than usual. Different foods and drinks have calcium and vitamin D in them (figure 2). People who don't get enough calcium and vitamin D in their diet might need to take a supplement.  Foods with protein - Protein helps your muscles stay strong. Healthy foods with a lot of protein include chicken, fish, eggs, beans, nuts, and soy products. Red meat also has a lot of protein, but it also contains fats, which can be unhealthy.  Some experts recommend a "Mediterranean diet." This involves eating a lot of fruits, vegetables, nuts, whole grains, and olive oil. It also includes some fish, poultry, and dairy products, but not a lot of red meat. Eating this way can help your overall health, and might even lower your risk of having a stroke.  What foods should I avoid or limit? -- To eat a healthy diet, there are some things you should avoid or limit. They include:   Fats - There are different types of fats. Some types of fats are better for your body than others.  Trans fats are especially unhealthy. They are found in margarines, many fast foods, and some store-bought baked goods. Trans fats can raise your cholesterol level and your increase your chance of getting heart disease. You should avoid eating foods with these types of fats.  The type of "polyunsaturated" fats found in fish seems to be healthy and can reduce your chance of getting heart disease. Other polyunsaturated fats might also be good for your health. When you cook, it's best to use oils with healthier fats, such as olive oil and canola oil.  Sugar - To have a healthy diet, it's important to limit or " "avoid sugar, sweets, and refined grains. Refined grains are found in white bread, white rice, most forms of pasta, and most packaged "snack" foods. Whole grains, such as whole-wheat bread and brown rice, have more fiber and are better for your health.  Avoiding sugar-sweetened beverages, like soda and sports drinks, can also help improve your health.  Red meat - Studies have shown that eating a lot of red meat can increase your risk of certain health problems, including heart disease and cancer. You should limit the amount of red meat that you eat.  Can I drink alcohol as part of a healthy diet? -- People who drink a small amount of alcohol each day might have a lower chance of getting heart disease. But drinking alcohol can lead to problems. For example, it can raise a person's chances of getting liver disease and certain types of cancers. In women, even 1 drink a day can increase the risk of getting breast cancer.  Most doctors recommend that adult women not have more than 1 drink a day and that adult men not have more than 2 drinks a day. The limits are different because most women's bodies take longer to break down alcohol.  How many calories do I need each day? -- The number of calories you need each day depends on your weight, height, age, sex, and how active you are.  Your doctor or nurse can tell you how many calories you should eat each day. If you are trying to lose weight, you should eat fewer calories each day.  What if I have questions? -- If you have questions about which foods you should or should not eat, ask your doctor or nurse. The right diet for you will depend, in part, on your health and any medical conditions you have.  All topics are updated as new evidence becomes available and our peer review process is complete.  This topic retrieved from [employer] on: Sep 21, 2021.  Topic 84752 Version 20.0  Release: 29.4.2 - C29.263  © 2021 UpToDate, Inc. and/or its affiliates. All rights " "reserved.  figure 1: Nutrition label - fiber     This is an example of a nutrition label. To figure out how much fiber is in a food, look for the line that says "Dietary Fiber." It's also important to look at the serving size. This food has 7 grams of fiber in each serving, and each serving is 1 cup.  Graphic 24773 Version 7.0    figure 2: Foods and drinks with calcium and vitamin D     Foods rich in calcium include ice cream, soy milk, breads, kale, broccoli, milk, cheese, cottage cheese, almonds, yogurt, ready-to-eat cereals, beans, and tofu. Foods rich in vitamin D include milk, fortified plant-based "milks" (soy, almond), canned tuna fish, cod liver oil, yogurt, ready-to-eat-cereals, cooked salmon, canned sardines, mackerel, and eggs. Some of these foods are rich in both.  Graphic 97799 Version 4.0    Consumer Information Use and Disclaimer   This information is not specific medical advice and does not replace information you receive from your health care provider. This is only a brief summary of general information. It does NOT include all information about conditions, illnesses, injuries, tests, procedures, treatments, therapies, discharge instructions or life-style choices that may apply to you. You must talk with your health care provider for complete information about your health and treatment options. This information should not be used to decide whether or not to accept your health care provider's advice, instructions or recommendations. Only your health care provider has the knowledge and training to provide advice that is right for you. The use of this information is governed by the [employer] End User License Agreement, available at https://www.[email or web address].[employer]/en/solutions/[employer]/about/yung.The use of [employer] content is governed by the [employer] Terms of Use. ©2021 UpToDate, Inc. All rights reserved.  Copyright   © 2021 UpToDate, Inc. and/or its affiliates. All rights reserved.    "

## 2023-03-28 NOTE — PROGRESS NOTES
Subjective     Patient ID: Marcell Dickson is a 52 y.o. female.    Chief Complaint: Healthy You (Here for healthy you and fasting labs. ), Foot Swelling (C/o left foot swelling ), and Shortness of Breath (States she has noticed she has had some shortness of breath on exertion since she has gained some weight. )    53 yo AAF presents to clinic today with CC of [employer] Healthy You.  Patient has a PMH significant for HTN, obesity, GERD, chronic constipation, insomnia, OA, and seasonal allergies.  Reports chronic issues are well controlled on current medication regimen.  Denies problems or side effects with medications.  Mammogram: 3/3/23 with recommendation to repeat in 1 year.  Pap Smear: 3/1/23   Colonoscopy: 8/7/2018 with recommendation to repeat in 5 years due to family history. Due 08/2023. Dr. Monzon.  Tobacco: denies  Alcohol: denies  Drug use: denies  H/o STDs: denies   Immunizations: flu vaccine fall of 2022.   Patient reports some intermittent issues with swelling in feet/legs. States she does have occasional SOB which she attributes to weight gain. States she has got to try to lose some weight.   Reports some anxiety/itching intermittently. States she used to take medication for this but does not recall the name of the medication. She would like to try something for these symptoms.  Patient is, otherwise, without complaints.       Review of Systems   Constitutional:  Negative for appetite change, chills, fatigue, fever and unexpected weight change.   Eyes:  Negative for visual disturbance.   Respiratory:  Negative for cough.         Reports occasional exertional SOB since she has gained a lot of weight   Cardiovascular:  Negative for chest pain.        Reports occasional swelling in feet/legs   Gastrointestinal:  Positive for constipation. Negative for abdominal pain, blood in stool, change in bowel habit, diarrhea, nausea, vomiting and change in bowel habit.        + chronic constipation -  improved on linzess   Musculoskeletal:  Negative for arthralgias.   Integumentary:  Negative for rash.   Neurological:  Negative for dizziness and headaches.   Psychiatric/Behavioral:  The patient is nervous/anxious.      Tobacco Use: Low Risk     Smoking Tobacco Use: Never    Smokeless Tobacco Use: Never    Passive Exposure: Never     Review of patient's allergies indicates:   Allergen Reactions    Sulfa (sulfonamide antibiotics)      Current Outpatient Medications   Medication Instructions    cetirizine (ZYRTEC) 10 mg, Oral, Daily    docusate sodium (COLACE) 100 mg, Oral, 2 times daily, For constipation    estradioL (ESTRACE) 1 MG tablet TAKE 1 TABLET(1 MG) BY MOUTH EVERY DAY    furosemide (LASIX) 20 mg, Oral, Daily PRN    hydroCHLOROthiazide (HYDRODIURIL) 25 mg, Oral, Daily    HYDROcodone-acetaminophen (NORCO) 5-325 mg per tablet 1 tablet, Oral, Every 4-6 hours PRN    hydrOXYzine pamoate (VISTARIL) 25 mg, Oral, Every 8 hours PRN    ibuprofen (ADVIL,MOTRIN) 600 mg, Oral, Every 6 hours PRN    LINZESS 145 mcg, Oral, Daily    meloxicam (MOBIC) 7.5 MG tablet TAKE 1 TABLET(7.5 MG) BY MOUTH EVERY DAY    pantoprazole (PROTONIX) 40 mg, Oral, Daily    potassium chloride (K-TAB) 20 mEq 20 mEq, Oral, Daily    semaglutide (OZEMPIC) 0.25 mg or 0.5 mg(2 mg/1.5 mL) pen injector Take 0.25 mg SC weekly X 1 month, then increase to 0.5 mg SC weekly.    traZODone (DESYREL) 100 mg, Oral, Nightly     Medications Discontinued During This Encounter   Medication Reason    LINZESS 145 mcg Cap capsule Reorder    pantoprazole (PROTONIX) 40 MG tablet Reorder    hydroCHLOROthiazide (HYDRODIURIL) 25 MG tablet Reorder    nystatin (MYCOSTATIN) 100,000 unit/mL suspension Patient no longer taking       Past Medical History:   Diagnosis Date    Arthritis     GERD (gastroesophageal reflux disease)     History of rectal polyps 08/07/2018     Health Maintenance Topics with due status: Not Due       Topic Last Completion Date    Colorectal Cancer  "Screening 08/07/2018    Hemoglobin A1c (Diabetic Prevention Screening) 03/28/2022    Lipid Panel 11/03/2022    Cervical Cancer Screening 03/01/2023    Mammogram 03/03/2023     Immunization History   Administered Date(s) Administered    COVID-19, MRNA, LN-S, PF (Pfizer) (Purple Cap) 02/23/2021, 03/16/2021, 10/22/2021    COVID-19, mRNA, LNP-S, bivalent booster, PF (PFIZER OMICRON) 12/21/2022    MMR 02/04/2020    Zoster Recombinant 08/02/2022, 10/05/2022       Objective     Body mass index is 38.03 kg/m².  Wt Readings from Last 3 Encounters:   03/28/23 106.9 kg (235 lb 9.6 oz)   03/01/23 104.8 kg (231 lb)   12/23/22 99.8 kg (220 lb)     Ht Readings from Last 3 Encounters:   03/28/23 5' 6" (1.676 m)   03/01/23 5' 6" (1.676 m)   12/23/22 5' 6" (1.676 m)     BP Readings from Last 3 Encounters:   03/28/23 110/80   03/01/23 124/82   12/23/22 (!) 144/90     Temp Readings from Last 3 Encounters:   03/28/23 98 °F (36.7 °C) (Oral)   12/23/22 97.9 °F (36.6 °C) (Oral)   12/15/22 98 °F (36.7 °C) (Oral)     Pulse Readings from Last 3 Encounters:   03/28/23 73   12/23/22 81   12/15/22 60     Resp Readings from Last 3 Encounters:   03/28/23 18   12/23/22 18   12/15/22 20     PF Readings from Last 3 Encounters:   03/01/23 71 L/min       Physical Exam  Constitutional:       General: She is not in acute distress.     Appearance: Normal appearance. She is obese.   HENT:      Nose: Nose normal.      Mouth/Throat:      Mouth: Mucous membranes are moist.      Pharynx: Oropharynx is clear.   Eyes:      Conjunctiva/sclera: Conjunctivae normal.   Cardiovascular:      Rate and Rhythm: Normal rate and regular rhythm.      Heart sounds: Normal heart sounds. No murmur heard.  Pulmonary:      Effort: Pulmonary effort is normal. No respiratory distress.      Breath sounds: Normal breath sounds. No wheezing, rhonchi or rales.   Abdominal:      General: Bowel sounds are normal.      Palpations: Abdomen is soft.      Tenderness: There is no abdominal " tenderness.   Musculoskeletal:      Cervical back: Neck supple.      Comments: + trace bilateral lower extremity edema   Skin:     Findings: No rash.   Neurological:      General: No focal deficit present.      Mental Status: She is alert. Mental status is at baseline.   Psychiatric:         Mood and Affect: Mood normal.       Assessment and Plan   1. Encounter for general adult medical examination with abnormal findings    2. Screening for diabetes mellitus  -     Hemoglobin A1C; Future; Expected date: 03/28/2023  -     Glucose, Fasting; Future; Expected date: 03/28/2023    3. Screening for lipoid disorders  -     Lipid Panel; Future; Expected date: 03/28/2023    4. Primary hypertension  -     hydroCHLOROthiazide (HYDRODIURIL) 25 MG tablet; Take 1 tablet (25 mg total) by mouth once daily.  Dispense: 90 tablet; Refill: 1  -     CBC Auto Differential; Future; Expected date: 03/28/2023  -     Comprehensive Metabolic Panel; Future; Expected date: 03/28/2023    5. Gastroesophageal reflux disease without esophagitis  -     pantoprazole (PROTONIX) 40 MG tablet; Take 1 tablet (40 mg total) by mouth once daily.  Dispense: 90 tablet; Refill: 1    6. Chronic constipation  -     LINZESS 145 mcg Cap capsule; Take 1 capsule (145 mcg total) by mouth once daily.  Dispense: 90 capsule; Refill: 1    7. Localized edema  -     NT-Pro Natriuretic Peptide; Future; Expected date: 03/28/2023  -     furosemide (LASIX) 20 MG tablet; Take 1 tablet (20 mg total) by mouth daily as needed (swelling).  Dispense: 90 tablet; Refill: 1    8. Exertional shortness of breath  -     NT-Pro Natriuretic Peptide; Future; Expected date: 03/28/2023    9. Class 2 obesity due to excess calories without serious comorbidity with body mass index (BMI) of 36.0 to 36.9 in adult  -     semaglutide (OZEMPIC) 0.25 mg or 0.5 mg(2 mg/1.5 mL) pen injector; Take 0.25 mg SC weekly X 1 month, then increase to 0.5 mg SC weekly.  Dispense: 1.5 mL; Refill: 5 - medication  refill. Patient was previously prescribed this by weight loss clinic.     10. Anxiety  -     hydrOXYzine pamoate (VISTARIL) 25 MG Cap; Take 1 capsule (25 mg total) by mouth every 8 (eight) hours as needed (itching or anxiety).  Dispense: 45 capsule; Refill: 2    11. Itching  -     hydrOXYzine pamoate (VISTARIL) 25 MG Cap; Take 1 capsule (25 mg total) by mouth every 8 (eight) hours as needed (itching or anxiety).  Dispense: 45 capsule; Refill: 2        Problem List Items Addressed This Visit          Cardiac/Vascular    Primary hypertension (Chronic)    Relevant Medications    hydroCHLOROthiazide (HYDRODIURIL) 25 MG tablet    Other Relevant Orders    CBC Auto Differential    Comprehensive Metabolic Panel       Endocrine    Class 2 obesity due to excess calories without serious comorbidity with body mass index (BMI) of 36.0 to 36.9 in adult (Chronic)    Relevant Medications    semaglutide (OZEMPIC) 0.25 mg or 0.5 mg(2 mg/1.5 mL) pen injector       GI    Chronic constipation (Chronic)    Relevant Medications    LINZESS 145 mcg Cap capsule    Gastroesophageal reflux disease without esophagitis (Chronic)    Relevant Medications    pantoprazole (PROTONIX) 40 MG tablet     Other Visit Diagnoses       Encounter for general adult medical examination with abnormal findings    -  Primary    Screening for diabetes mellitus        Relevant Orders    Hemoglobin A1C    Glucose, Fasting    Screening for lipoid disorders        Relevant Orders    Lipid Panel    Localized edema        Relevant Medications    furosemide (LASIX) 20 MG tablet    Other Relevant Orders    NT-Pro Natriuretic Peptide    Exertional shortness of breath        Relevant Orders    NT-Pro Natriuretic Peptide    Anxiety        Relevant Medications    hydrOXYzine pamoate (VISTARIL) 25 MG Cap    Itching        Relevant Medications    hydrOXYzine pamoate (VISTARIL) 25 MG Cap              Plan: RTC in 1 year for [employer] Healthy You. RTC as scheduled for chronic follow  up.      I have reviewed the medications, allergies, and problem list.     Goal Actions:    What type of visit is the patient here for today?: Healthy You  Does the patient consent to enroll in [employer] Healthy?: Yes  Is this a Wellness Follow Up?: No  What is your overall wellness goal? (select at least one): Lose weight  Choose 3: Exercise, Biometric, Lifestyle  Biometric Actions: BMI>30 lose 1-2 lbs per week, Attend regularly scheduled office visits, SBP<120 and DBP<80  Exercise Actions: Recommend physical activity 30 minutes per day 3-5 times/week

## 2023-03-29 ENCOUNTER — PATIENT OUTREACH (OUTPATIENT)
Dept: ADMINISTRATIVE | Facility: HOSPITAL | Age: 53
End: 2023-03-29

## 2023-03-29 NOTE — PROGRESS NOTES
..Post visit Population Health review of encounter with date of service  3/28/23 with Jennifer.  All required HY components in encounter.    Added note to followup appt for visit to be [facility] #[id number]   Care everywhere updated  Added myself to care team            ..  Health Maintenance Due   Topic Date Due    TETANUS VACCINE  Never done

## 2023-04-12 DIAGNOSIS — E87.6 HYPOKALEMIA: Primary | ICD-10-CM

## 2023-04-12 DIAGNOSIS — E87.6 LOW SERUM POTASSIUM: Primary | ICD-10-CM

## 2023-04-12 RX ORDER — POTASSIUM CHLORIDE 20 MEQ/1
20 TABLET, EXTENDED RELEASE ORAL 2 TIMES DAILY
Qty: 180 TABLET | Refills: 1 | Status: SHIPPED | OUTPATIENT
Start: 2023-04-12 | End: 2023-06-14

## 2023-04-12 RX ORDER — POTASSIUM CHLORIDE 20 MEQ/1
20 TABLET, EXTENDED RELEASE ORAL 2 TIMES DAILY
COMMUNITY
End: 2023-05-09

## 2023-04-12 NOTE — PROGRESS NOTES
Spoke with patient advised lab results, patient states ok to send new prescription, she has been taking over the counter potassium supplement.new rx in basket for dr. Wild

## 2023-05-03 ENCOUNTER — OFFICE VISIT (OUTPATIENT)
Dept: FAMILY MEDICINE | Facility: CLINIC | Age: 53
End: 2023-05-03
Payer: COMMERCIAL

## 2023-05-03 VITALS
HEIGHT: 67 IN | WEIGHT: 238.63 LBS | OXYGEN SATURATION: 100 % | SYSTOLIC BLOOD PRESSURE: 108 MMHG | DIASTOLIC BLOOD PRESSURE: 74 MMHG | HEART RATE: 66 BPM | TEMPERATURE: 98 F | BODY MASS INDEX: 37.45 KG/M2 | RESPIRATION RATE: 20 BRPM

## 2023-05-03 DIAGNOSIS — M51.36 BULGING LUMBAR DISC: Chronic | ICD-10-CM

## 2023-05-03 DIAGNOSIS — K44.9 HIATAL HERNIA: Chronic | ICD-10-CM

## 2023-05-03 DIAGNOSIS — J30.89 SEASONAL ALLERGIC RHINITIS DUE TO OTHER ALLERGIC TRIGGER: Chronic | ICD-10-CM

## 2023-05-03 DIAGNOSIS — I10 PRIMARY HYPERTENSION: Primary | Chronic | ICD-10-CM

## 2023-05-03 DIAGNOSIS — R20.0 NUMBNESS AND TINGLING IN BOTH HANDS: ICD-10-CM

## 2023-05-03 DIAGNOSIS — R20.2 NUMBNESS AND TINGLING IN BOTH HANDS: ICD-10-CM

## 2023-05-03 DIAGNOSIS — M79.605 LEFT LEG PAIN: ICD-10-CM

## 2023-05-03 DIAGNOSIS — R53.83 OTHER FATIGUE: ICD-10-CM

## 2023-05-03 DIAGNOSIS — R11.2 NAUSEA AND VOMITING, UNSPECIFIED VOMITING TYPE: ICD-10-CM

## 2023-05-03 DIAGNOSIS — M19.90 ARTHRITIS: Chronic | ICD-10-CM

## 2023-05-03 PROBLEM — M51.369 BULGING LUMBAR DISC: Chronic | Status: ACTIVE | Noted: 2023-05-03

## 2023-05-03 LAB
25(OH)D3 SERPL-MCNC: 26.4 NG/ML
ANION GAP SERPL CALCULATED.3IONS-SCNC: 4 MMOL/L (ref 7–16)
BUN SERPL-MCNC: 14 MG/DL (ref 7–18)
BUN/CREAT SERPL: 16 (ref 6–20)
CALCIUM SERPL-MCNC: 9.3 MG/DL (ref 8.5–10.1)
CHLORIDE SERPL-SCNC: 107 MMOL/L (ref 98–107)
CO2 SERPL-SCNC: 30 MMOL/L (ref 21–32)
CREAT SERPL-MCNC: 0.85 MG/DL (ref 0.55–1.02)
CREAT UR-MCNC: 36 MG/DL (ref 28–219)
EGFR (NO RACE VARIABLE) (RUSH/TITUS): 82 ML/MIN/1.73M2
FOLATE SERPL-MCNC: >20 NG/ML (ref 3.1–17.5)
GLUCOSE SERPL-MCNC: 80 MG/DL (ref 74–106)
MICROALBUMIN UR-MCNC: <0.5 MG/DL (ref 0–2.8)
MICROALBUMIN/CREAT RATIO PNL UR: <13.9 MG/G (ref 0–30)
POTASSIUM SERPL-SCNC: 3.8 MMOL/L (ref 3.5–5.1)
SODIUM SERPL-SCNC: 137 MMOL/L (ref 136–145)
T4 FREE SERPL-MCNC: 0.76 NG/DL (ref 0.76–1.46)
TSH SERPL DL<=0.005 MIU/L-ACNC: 1.03 UIU/ML (ref 0.36–3.74)
VIT B12 SERPL-MCNC: 478 PG/ML (ref 193–986)

## 2023-05-03 PROCEDURE — 82306 VITAMIN D: ICD-10-PCS | Mod: ,,, | Performed by: CLINICAL MEDICAL LABORATORY

## 2023-05-03 PROCEDURE — 1160F RVW MEDS BY RX/DR IN RCRD: CPT | Mod: ,,, | Performed by: FAMILY MEDICINE

## 2023-05-03 PROCEDURE — 84439 ASSAY OF FREE THYROXINE: CPT | Mod: ,,, | Performed by: CLINICAL MEDICAL LABORATORY

## 2023-05-03 PROCEDURE — 82746 ASSAY OF FOLIC ACID SERUM: CPT | Mod: ,,, | Performed by: CLINICAL MEDICAL LABORATORY

## 2023-05-03 PROCEDURE — 82607 VITAMIN B-12: CPT | Mod: ,,, | Performed by: CLINICAL MEDICAL LABORATORY

## 2023-05-03 PROCEDURE — 3008F BODY MASS INDEX DOCD: CPT | Mod: ,,, | Performed by: FAMILY MEDICINE

## 2023-05-03 PROCEDURE — 1159F MED LIST DOCD IN RCRD: CPT | Mod: ,,, | Performed by: FAMILY MEDICINE

## 2023-05-03 PROCEDURE — 1160F PR REVIEW ALL MEDS BY PRESCRIBER/CLIN PHARMACIST DOCUMENTED: ICD-10-PCS | Mod: ,,, | Performed by: FAMILY MEDICINE

## 2023-05-03 PROCEDURE — 82306 VITAMIN D 25 HYDROXY: CPT | Mod: ,,, | Performed by: CLINICAL MEDICAL LABORATORY

## 2023-05-03 PROCEDURE — 99214 OFFICE O/P EST MOD 30 MIN: CPT | Mod: ,,, | Performed by: FAMILY MEDICINE

## 2023-05-03 PROCEDURE — 84443 TSH: ICD-10-PCS | Mod: ,,, | Performed by: CLINICAL MEDICAL LABORATORY

## 2023-05-03 PROCEDURE — 99214 PR OFFICE/OUTPT VISIT, EST, LEVL IV, 30-39 MIN: ICD-10-PCS | Mod: ,,, | Performed by: FAMILY MEDICINE

## 2023-05-03 PROCEDURE — 80048 BASIC METABOLIC PANEL: ICD-10-PCS | Mod: ,,, | Performed by: CLINICAL MEDICAL LABORATORY

## 2023-05-03 PROCEDURE — 84439 T4, FREE: ICD-10-PCS | Mod: ,,, | Performed by: CLINICAL MEDICAL LABORATORY

## 2023-05-03 PROCEDURE — 82043 UR ALBUMIN QUANTITATIVE: CPT | Mod: ,,, | Performed by: CLINICAL MEDICAL LABORATORY

## 2023-05-03 PROCEDURE — 3074F PR MOST RECENT SYSTOLIC BLOOD PRESSURE < 130 MM HG: ICD-10-PCS | Mod: ,,, | Performed by: FAMILY MEDICINE

## 2023-05-03 PROCEDURE — 82570 ASSAY OF URINE CREATININE: CPT | Mod: ,,, | Performed by: CLINICAL MEDICAL LABORATORY

## 2023-05-03 PROCEDURE — 84443 ASSAY THYROID STIM HORMONE: CPT | Mod: ,,, | Performed by: CLINICAL MEDICAL LABORATORY

## 2023-05-03 PROCEDURE — 1159F PR MEDICATION LIST DOCUMENTED IN MEDICAL RECORD: ICD-10-PCS | Mod: ,,, | Performed by: FAMILY MEDICINE

## 2023-05-03 PROCEDURE — 3078F DIAST BP <80 MM HG: CPT | Mod: ,,, | Performed by: FAMILY MEDICINE

## 2023-05-03 PROCEDURE — 82746 FOLATE: ICD-10-PCS | Mod: ,,, | Performed by: CLINICAL MEDICAL LABORATORY

## 2023-05-03 PROCEDURE — 82607 VITAMIN B12: ICD-10-PCS | Mod: ,,, | Performed by: CLINICAL MEDICAL LABORATORY

## 2023-05-03 PROCEDURE — 3044F PR MOST RECENT HEMOGLOBIN A1C LEVEL <7.0%: ICD-10-PCS | Mod: ,,, | Performed by: FAMILY MEDICINE

## 2023-05-03 PROCEDURE — 80048 BASIC METABOLIC PNL TOTAL CA: CPT | Mod: ,,, | Performed by: CLINICAL MEDICAL LABORATORY

## 2023-05-03 PROCEDURE — 82570 MICROALBUMIN / CREATININE RATIO URINE: ICD-10-PCS | Mod: ,,, | Performed by: CLINICAL MEDICAL LABORATORY

## 2023-05-03 PROCEDURE — 3044F HG A1C LEVEL LT 7.0%: CPT | Mod: ,,, | Performed by: FAMILY MEDICINE

## 2023-05-03 PROCEDURE — 3074F SYST BP LT 130 MM HG: CPT | Mod: ,,, | Performed by: FAMILY MEDICINE

## 2023-05-03 PROCEDURE — 3078F PR MOST RECENT DIASTOLIC BLOOD PRESSURE < 80 MM HG: ICD-10-PCS | Mod: ,,, | Performed by: FAMILY MEDICINE

## 2023-05-03 PROCEDURE — 82043 MICROALBUMIN / CREATININE RATIO URINE: ICD-10-PCS | Mod: ,,, | Performed by: CLINICAL MEDICAL LABORATORY

## 2023-05-03 PROCEDURE — 3008F PR BODY MASS INDEX (BMI) DOCUMENTED: ICD-10-PCS | Mod: ,,, | Performed by: FAMILY MEDICINE

## 2023-05-03 RX ORDER — CETIRIZINE HYDROCHLORIDE 10 MG/1
10 TABLET ORAL DAILY
Qty: 90 TABLET | Refills: 1 | Status: SHIPPED | OUTPATIENT
Start: 2023-05-03 | End: 2023-10-30

## 2023-05-03 RX ORDER — GABAPENTIN 100 MG/1
CAPSULE ORAL
Qty: 90 CAPSULE | Refills: 1 | Status: SHIPPED | OUTPATIENT
Start: 2023-05-03 | End: 2023-06-14 | Stop reason: DRUGHIGH

## 2023-05-03 RX ORDER — NAPROXEN 500 MG/1
500 TABLET ORAL 2 TIMES DAILY PRN
Qty: 45 TABLET | Refills: 1 | Status: SHIPPED | OUTPATIENT
Start: 2023-05-03 | End: 2023-07-10 | Stop reason: SDUPTHER

## 2023-05-03 NOTE — PROGRESS NOTES
Clinic Note    Patient Name: Marcell Dickson  : 1970  MRN: [id number]    Chief Complaint   Patient presents with    Follow-up     Six months follow up       HPI:    Ms. Marcell Dickson is a 52 y.o. female who presents to clinic today with CC of follow up on chronic disease processes including HTN, obesity, GERD, chronic constipation, insomnia, OA, and seasonal allergies.   Patient reports chronic pain in bilateral hands but R is worse than L. Reports numbness/tingling in bilateral hands with R being worse. She has never had a nerve conduction study to evaluate for carpal tunnel.  Reports chronic, intermittent issues with L lower extremity pain. Reports she has a bulging disc in her lower back. Reports pain radiates down LLE but is more of an aching pain that numbness.  Reports chronic, intermittent issues with nausea/vomiting since gastric sleeve. Reports her gastric sleeve was 5-6 years ago. Reports despite this issue she has been gaining weight back that she had previously lost.   Patient reports chronic issues are, otherwise, well controlled on current medication regimen.  Denies problems or side effects with medications.  Patient is, otherwise, without complaints.     Medications:  Medication List with Changes/Refills   New Medications    GABAPENTIN (NEURONTIN) 100 MG CAPSULE    Take one tablet by mouth at night X 1 week. Then gradually increase to three times daily as needed as tolerated for nerve pain.    NAPROXEN (NAPROSYN) 500 MG TABLET    Take 1 tablet (500 mg total) by mouth 2 (two) times daily as needed (joint pain. Take with food.).   Current Medications    ESTRADIOL (ESTRACE) 1 MG TABLET    TAKE 1 TABLET(1 MG) BY MOUTH EVERY DAY    FUROSEMIDE (LASIX) 20 MG TABLET    Take 1 tablet (20 mg total) by mouth daily as needed (swelling).    HYDROCHLOROTHIAZIDE (HYDRODIURIL) 25 MG TABLET    Take 1 tablet (25 mg total) by mouth once daily.    HYDROCODONE-ACETAMINOPHEN (NORCO) 5-325 MG  PER TABLET    Take 1 tablet by mouth every 4 to 6 hours as needed.    HYDROXYZINE PAMOATE (VISTARIL) 25 MG CAP    Take 1 capsule (25 mg total) by mouth every 8 (eight) hours as needed (itching or anxiety).    LINZESS 145 MCG CAP CAPSULE    Take 1 capsule (145 mcg total) by mouth once daily.    PANTOPRAZOLE (PROTONIX) 40 MG TABLET    Take 1 tablet (40 mg total) by mouth once daily.    POTASSIUM CHLORIDE SA (K-DUR,KLOR-CON) 20 MEQ TABLET    Take 1 tablet (20 mEq total) by mouth 2 (two) times daily.    POTASSIUM CHLORIDE SA (K-DUR,KLOR-CON) 20 MEQ TABLET    Take 20 mEq by mouth 2 (two) times daily.    SEMAGLUTIDE (OZEMPIC) 0.25 MG OR 0.5 MG(2 MG/1.5 ML) PEN INJECTOR    Take 0.25 mg SC weekly X 1 month, then increase to 0.5 mg SC weekly.    TRAZODONE (DESYREL) 100 MG TABLET    Take 1 tablet (100 mg total) by mouth every evening.   Changed and/or Refilled Medications    Modified Medication Previous Medication    CETIRIZINE (ZYRTEC) 10 MG TABLET cetirizine (ZYRTEC) 10 MG tablet       Take 1 tablet (10 mg total) by mouth once daily.    Take 1 tablet (10 mg total) by mouth once daily.   Discontinued Medications    DOCUSATE SODIUM (COLACE) 100 MG CAPSULE    Take 1 capsule (100 mg total) by mouth 2 (two) times daily. For constipation    IBUPROFEN (ADVIL,MOTRIN) 600 MG TABLET    Take 1 tablet (600 mg total) by mouth every 6 (six) hours as needed for Pain.    MELOXICAM (MOBIC) 7.5 MG TABLET    TAKE 1 TABLET(7.5 MG) BY MOUTH EVERY DAY        Allergies: Sulfa (sulfonamide antibiotics)      Past Medical History:    Past Medical History:   Diagnosis Date    Arthritis     GERD (gastroesophageal reflux disease)     History of rectal polyps 08/07/2018       Past Surgical History:    Past Surgical History:   Procedure Laterality Date    breast reduction      CHOLECYSTECTOMY  10/28/2013    Dr. Paige    COLONOSCOPY  2018    gastric sleeve      HYSTERECTOMY  10/28/2013    Robotic, BSO,Cystoscopy, TOT-    OOPHORECTOMY      TUBAL  "LIGATION      tummy tuck           Social History:    Social History     Tobacco Use   Smoking Status Never    Passive exposure: Never   Smokeless Tobacco Never     Social History     Substance and Sexual Activity   Alcohol Use Not Currently     Social History     Substance and Sexual Activity   Drug Use Never         Family History:    Family History   Problem Relation Age of Onset    Colon cancer Sister        Review of Systems:    Review of Systems   Constitutional:  Positive for fatigue. Negative for appetite change, chills, fever and unexpected weight change.   Eyes:  Negative for visual disturbance.   Respiratory:  Positive for shortness of breath. Negative for cough.         Reports chronic, intermittent issues with shortness of breath   Cardiovascular:  Negative for chest pain and leg swelling.   Gastrointestinal:  Positive for constipation, nausea and vomiting. Negative for abdominal pain, blood in stool, change in bowel habit, diarrhea and change in bowel habit.        Reports chronic, intermittent issues with nausea/vomiting since gastric sleeve surgery 5-6 years ago but has gradually worsened  Reports chronic, intermittent issues with constipation - improved with linzess   Musculoskeletal:  Positive for arthralgias and back pain.   Integumentary:  Negative for rash.   Neurological:  Negative for dizziness.        Reports occasional sinus headaches   Psychiatric/Behavioral:  The patient is not nervous/anxious.       Vitals:    Vitals:    05/03/23 0914   BP: 108/74   BP Location: Left arm   Patient Position: Sitting   BP Method: Large (Manual)   Pulse: 66   Resp: 20   Temp: 97.8 °F (36.6 °C)   TempSrc: Oral   SpO2: 100%   Weight: 108.2 kg (238 lb 9.6 oz)   Height: 5' 7" (1.702 m)       Body mass index is 37.37 kg/m².    Wt Readings from Last 3 Encounters:   05/03/23 0914 108.2 kg (238 lb 9.6 oz)   03/28/23 0819 106.9 kg (235 lb 9.6 oz)   03/01/23 1506 104.8 kg (231 lb)        Physical Exam:    Physical " Exam  Constitutional:       General: She is not in acute distress.     Appearance: Normal appearance. She is obese.   HENT:      Nose: Nose normal.      Mouth/Throat:      Mouth: Mucous membranes are moist.      Pharynx: Oropharynx is clear.   Eyes:      Conjunctiva/sclera: Conjunctivae normal.   Cardiovascular:      Rate and Rhythm: Normal rate and regular rhythm.      Heart sounds: Normal heart sounds. No murmur heard.  Pulmonary:      Effort: Pulmonary effort is normal. No respiratory distress.      Breath sounds: Normal breath sounds. No wheezing, rhonchi or rales.   Abdominal:      General: Bowel sounds are normal.      Palpations: Abdomen is soft.      Tenderness: There is no abdominal tenderness.   Musculoskeletal:         General: No swelling or tenderness. Normal range of motion.      Cervical back: Neck supple.      Right lower leg: No edema.      Left lower leg: No edema.      Comments: (-) Tinels and phalens   Skin:     Findings: No rash.   Neurological:      General: No focal deficit present.      Mental Status: She is alert. Mental status is at baseline.   Psychiatric:         Mood and Affect: Mood normal.       Assessment/Plan:   1. Primary hypertension  -     Basic Metabolic Panel; Future; Expected date: 05/03/2023  -     Microalbumin/Creatinine Ratio, Urine    2. Seasonal allergic rhinitis due to other allergic trigger  -     cetirizine (ZYRTEC) 10 MG tablet; Take 1 tablet (10 mg total) by mouth once daily.  Dispense: 90 tablet; Refill: 1    3. Arthritis  -     naproxen (NAPROSYN) 500 MG tablet; Take 1 tablet (500 mg total) by mouth 2 (two) times daily as needed (joint pain. Take with food.).  Dispense: 45 tablet; Refill: 1- new medication. Risks/benefits/potential side effects/black box warning reviewed and discussed with patient.   - D/c mobic    4. Other fatigue  -     Vitamin D; Future; Expected date: 05/03/2023  -     Vitamin B12; Future; Expected date: 05/03/2023  -     TSH; Future; Expected  date: 05/03/2023  -     T4, Free; Future; Expected date: 05/03/2023  -     Folate; Future; Expected date: 05/03/2023    5. Bulging lumbar disc  -     Ambulatory referral/consult to Pain Clinic; Future; Expected date: 05/10/2023    6. Left leg pain  -     Ambulatory referral/consult to Pain Clinic; Future; Expected date: 05/10/2023    7. Numbness and tingling in both hands  -     Nerve conduction test; Future  -     gabapentin (NEURONTIN) 100 MG capsule; Take one tablet by mouth at night X 1 week. Then gradually increase to three times daily as needed as tolerated for nerve pain.  Dispense: 90 capsule; Refill: 1- new medication. Risks/benefits/potential side effects/black box warning reviewed and discussed with patient.     8. Nausea and vomiting, unspecified vomiting type  -     Ambulatory referral/consult to Gastroenterology; Future; Expected date: 05/10/2023    9. Hiatal hernia  -     Ambulatory referral/consult to Gastroenterology; Future; Expected date: 05/10/2023         Active Problem List with Overview Notes    Diagnosis Date Noted    Primary hypertension 06/28/2022    Other insomnia 11/03/2022    Class 2 obesity due to excess calories without serious comorbidity with body mass index (BMI) of 36.0 to 36.9 in adult 11/03/2022    Arthritis 06/28/2022    Bulging lumbar disc 05/03/2023    Gastroesophageal reflux disease without esophagitis 11/03/2022    Chronic constipation 06/28/2022    Hiatal hernia 05/03/2023    Seasonal allergic rhinitis 06/28/2022        Health Maintenance:  Health Maintenance   Topic Date Due    TETANUS VACCINE  Never done    Mammogram  03/03/2024    Lipid Panel  03/28/2028    Hepatitis C Screening  Completed       RTC in 6 weeks for follow up on follow up on OA/numbness/tingling in hands/back/leg pain  RTC sooner if needed.   Patient voiced understanding and is agreeable to plan.      Aileen Rodriguez MD    Family Medicine

## 2023-05-04 DIAGNOSIS — R79.89 LOW VITAMIN D LEVEL: Primary | ICD-10-CM

## 2023-05-04 RX ORDER — ERGOCALCIFEROL 1.25 MG/1
50000 CAPSULE ORAL
Qty: 12 CAPSULE | Refills: 0 | Status: SHIPPED | OUTPATIENT
Start: 2023-05-04 | End: 2023-09-19

## 2023-05-09 ENCOUNTER — OFFICE VISIT (OUTPATIENT)
Dept: GASTROENTEROLOGY | Facility: CLINIC | Age: 53
End: 2023-05-09
Payer: COMMERCIAL

## 2023-05-09 ENCOUNTER — HOSPITAL ENCOUNTER (OUTPATIENT)
Dept: RADIOLOGY | Facility: HOSPITAL | Age: 53
Discharge: HOME | End: 2023-05-09
Payer: COMMERCIAL

## 2023-05-09 VITALS
SYSTOLIC BLOOD PRESSURE: 124 MMHG | DIASTOLIC BLOOD PRESSURE: 71 MMHG | OXYGEN SATURATION: 98 % | HEART RATE: 72 BPM | HEIGHT: 67 IN | WEIGHT: 240.63 LBS | BODY MASS INDEX: 37.77 KG/M2

## 2023-05-09 DIAGNOSIS — R10.84 GENERALIZED ABDOMINAL PAIN: ICD-10-CM

## 2023-05-09 DIAGNOSIS — Z12.11 SCREENING FOR COLON CANCER: ICD-10-CM

## 2023-05-09 DIAGNOSIS — Z86.010 PERSONAL HISTORY OF COLONIC POLYPS: ICD-10-CM

## 2023-05-09 DIAGNOSIS — K59.09 CHRONIC CONSTIPATION: Primary | Chronic | ICD-10-CM

## 2023-05-09 PROCEDURE — 1159F MED LIST DOCD IN RCRD: CPT | Mod: ,,,

## 2023-05-09 PROCEDURE — 99214 PR OFFICE/OUTPT VISIT, EST, LEVL IV, 30-39 MIN: ICD-10-PCS | Mod: S$PBB,,,

## 2023-05-09 PROCEDURE — 3066F PR DOCUMENTATION OF TREATMENT FOR NEPHROPATHY: ICD-10-PCS | Mod: ,,,

## 2023-05-09 PROCEDURE — 3078F DIAST BP <80 MM HG: CPT | Mod: ,,,

## 2023-05-09 PROCEDURE — 1160F PR REVIEW ALL MEDS BY PRESCRIBER/CLIN PHARMACIST DOCUMENTED: ICD-10-PCS | Mod: ,,,

## 2023-05-09 PROCEDURE — 3044F HG A1C LEVEL LT 7.0%: CPT | Mod: ,,,

## 2023-05-09 PROCEDURE — 74018 XR KUB: ICD-10-PCS | Mod: 26,,, | Performed by: RADIOLOGY

## 2023-05-09 PROCEDURE — 74018 RADEX ABDOMEN 1 VIEW: CPT | Mod: TC

## 2023-05-09 PROCEDURE — 99214 OFFICE O/P EST MOD 30 MIN: CPT | Mod: S$PBB,,,

## 2023-05-09 PROCEDURE — 3066F NEPHROPATHY DOC TX: CPT | Mod: ,,,

## 2023-05-09 PROCEDURE — 1160F RVW MEDS BY RX/DR IN RCRD: CPT | Mod: ,,,

## 2023-05-09 PROCEDURE — 3008F PR BODY MASS INDEX (BMI) DOCUMENTED: ICD-10-PCS | Mod: ,,,

## 2023-05-09 PROCEDURE — 3044F PR MOST RECENT HEMOGLOBIN A1C LEVEL <7.0%: ICD-10-PCS | Mod: ,,,

## 2023-05-09 PROCEDURE — 3074F SYST BP LT 130 MM HG: CPT | Mod: ,,,

## 2023-05-09 PROCEDURE — 3074F PR MOST RECENT SYSTOLIC BLOOD PRESSURE < 130 MM HG: ICD-10-PCS | Mod: ,,,

## 2023-05-09 PROCEDURE — 74018 RADEX ABDOMEN 1 VIEW: CPT | Mod: 26,,, | Performed by: RADIOLOGY

## 2023-05-09 PROCEDURE — 3061F NEG MICROALBUMINURIA REV: CPT | Mod: ,,,

## 2023-05-09 PROCEDURE — 99215 OFFICE O/P EST HI 40 MIN: CPT | Mod: PBBFAC

## 2023-05-09 PROCEDURE — 3078F PR MOST RECENT DIASTOLIC BLOOD PRESSURE < 80 MM HG: ICD-10-PCS | Mod: ,,,

## 2023-05-09 PROCEDURE — 3061F PR NEG MICROALBUMINURIA RESULT DOCUMENTED/REVIEW: ICD-10-PCS | Mod: ,,,

## 2023-05-09 PROCEDURE — 1159F PR MEDICATION LIST DOCUMENTED IN MEDICAL RECORD: ICD-10-PCS | Mod: ,,,

## 2023-05-09 PROCEDURE — 3008F BODY MASS INDEX DOCD: CPT | Mod: ,,,

## 2023-05-09 RX ORDER — LINACLOTIDE 145 UG/1
290 CAPSULE, GELATIN COATED ORAL DAILY
Qty: 90 CAPSULE | Refills: 1 | Status: SHIPPED | OUTPATIENT
Start: 2023-05-09 | End: 2023-06-14 | Stop reason: DRUGHIGH

## 2023-05-09 NOTE — PROGRESS NOTES
Marcell Dickson is a 52 y.o. female here for Nausea, Emesis, Hiatal Hernia, and Constipation        PCP: Arlet Rodriguez  Referring Provider: No referring provider defined for this encounter.     HPI:  Patient is a 51 yo female who presents to clinic with complaints of constipation. She has associated abdominal pain, nausea, vomiting, bloating, and decreased appetite. Symptoms worsening over the past several weeks. Reports some relief when she is able to have bowel movement. Currently taking 145 mcg Linzess; does not feel this is helping as well as previous. Denies any hematochezia or melena. Last colonoscopy 08/2018 with polyps (Dr. Monzon).    Nausea  Associated symptoms include abdominal pain, nausea and vomiting. Pertinent negatives include no change in bowel habit or fever.   Emesis   Associated symptoms include abdominal pain. Pertinent negatives include no diarrhea or fever.   Constipation  Associated symptoms include abdominal pain, nausea and vomiting. Pertinent negatives include no diarrhea or fever.       ROS:  Review of Systems   Constitutional:  Positive for appetite change. Negative for fever and unexpected weight change.   Gastrointestinal:  Positive for abdominal pain, constipation, nausea, vomiting and reflux. Negative for blood in stool, change in bowel habit, diarrhea and change in bowel habit.        PMHX:  has a past medical history of Arthritis, GERD (gastroesophageal reflux disease), and History of rectal polyps (08/07/2018).    PSHX:  has a past surgical history that includes gastric sleeve; Tubal ligation; breast reduction; tummy tuck; Oophorectomy; Hysterectomy (10/28/2013); Cholecystectomy (10/28/2013); and Colonoscopy (2018).    PFHX: family history includes Colon cancer in her sister.    PSlHX:  reports that she has never smoked. She has never been exposed to tobacco smoke. She has never used smokeless tobacco. She reports current alcohol use. She reports that  she does not use drugs.        Review of patient's allergies indicates:   Allergen Reactions    Sulfa (sulfonamide antibiotics)        Medication List with Changes/Refills   New Medications    POLYETHYLENE GLYCOL (GOLYTELY) 236-22.74-6.74 -5.86 GRAM SUSPENSION    Take 4,000 mLs (4 L total) by mouth once. for 1 dose   Current Medications    CETIRIZINE (ZYRTEC) 10 MG TABLET    Take 1 tablet (10 mg total) by mouth once daily.    ERGOCALCIFEROL (ERGOCALCIFEROL) 50,000 UNIT CAP    Take 1 capsule (50,000 Units total) by mouth every 7 days.    FUROSEMIDE (LASIX) 20 MG TABLET    Take 1 tablet (20 mg total) by mouth daily as needed (swelling).    GABAPENTIN (NEURONTIN) 100 MG CAPSULE    Take one tablet by mouth at night X 1 week. Then gradually increase to three times daily as needed as tolerated for nerve pain.    HYDROCHLOROTHIAZIDE (HYDRODIURIL) 25 MG TABLET    Take 1 tablet (25 mg total) by mouth once daily.    HYDROXYZINE PAMOATE (VISTARIL) 25 MG CAP    Take 1 capsule (25 mg total) by mouth every 8 (eight) hours as needed (itching or anxiety).    NAPROXEN (NAPROSYN) 500 MG TABLET    Take 1 tablet (500 mg total) by mouth 2 (two) times daily as needed (joint pain. Take with food.).    PANTOPRAZOLE (PROTONIX) 40 MG TABLET    Take 1 tablet (40 mg total) by mouth once daily.    POTASSIUM CHLORIDE SA (K-DUR,KLOR-CON) 20 MEQ TABLET    Take 1 tablet (20 mEq total) by mouth 2 (two) times daily.    TRAZODONE (DESYREL) 100 MG TABLET    Take 1 tablet (100 mg total) by mouth every evening.   Changed and/or Refilled Medications    Modified Medication Previous Medication    LINZESS 145 MCG CAP CAPSULE LINZESS 145 mcg Cap capsule       Take 2 capsules (290 mcg total) by mouth once daily.    Take 1 capsule (145 mcg total) by mouth once daily.   Discontinued Medications    ESTRADIOL (ESTRACE) 1 MG TABLET    TAKE 1 TABLET(1 MG) BY MOUTH EVERY DAY    HYDROCODONE-ACETAMINOPHEN (NORCO) 5-325 MG PER TABLET    Take 1 tablet by mouth every 4 to  "6 hours as needed.    POTASSIUM CHLORIDE SA (K-DUR,KLOR-CON) 20 MEQ TABLET    Take 20 mEq by mouth 2 (two) times daily.    SEMAGLUTIDE (OZEMPIC) 0.25 MG OR 0.5 MG(2 MG/1.5 ML) PEN INJECTOR    Take 0.25 mg SC weekly X 1 month, then increase to 0.5 mg SC weekly.        Objective Findings:  Vital Signs:  /71   Pulse 72   Ht 5' 7" (1.702 m)   Wt 109.1 kg (240 lb 9.6 oz)   SpO2 98%   BMI 37.68 kg/m²  Body mass index is 37.68 kg/m².    Physical Exam:  Physical Exam  Vitals reviewed.   Constitutional:       General: She is not in acute distress.     Appearance: Normal appearance.   HENT:      Mouth/Throat:      Mouth: Mucous membranes are moist.   Cardiovascular:      Rate and Rhythm: Normal rate and regular rhythm.      Pulses: Normal pulses.   Pulmonary:      Effort: Pulmonary effort is normal.      Breath sounds: Normal breath sounds.   Abdominal:      General: Bowel sounds are normal. There is no distension.      Palpations: Abdomen is soft. There is no mass.      Tenderness: There is no abdominal tenderness. There is no guarding.   Musculoskeletal:         General: Normal range of motion.   Skin:     General: Skin is warm and dry.   Neurological:      Mental Status: She is alert and oriented to person, place, and time. Mental status is at baseline.   Psychiatric:         Mood and Affect: Mood normal.        Labs:  Lab Results   Component Value Date    WBC 4.35 (L) 03/28/2023    HGB 11.7 (L) 03/28/2023    HCT 37.9 (L) 03/28/2023    MCV 98.2 (H) 03/28/2023    RDW 13.2 03/28/2023     03/28/2023    LYMPH 40.9 03/28/2023    LYMPH 1.78 03/28/2023    MONO 9.2 (H) 03/28/2023    EOS 0.14 03/28/2023    BASO 0.03 03/28/2023     Lab Results   Component Value Date     05/03/2023    K 3.8 05/03/2023     05/03/2023    CO2 30 05/03/2023    GLU 80 05/03/2023    BUN 14 05/03/2023    CREATININE 0.85 05/03/2023    CALCIUM 9.3 05/03/2023    PROT 7.1 03/28/2023    ALBUMIN 3.5 03/28/2023    BILITOT 0.4 " 03/28/2023    ALKPHOS 91 03/28/2023    AST 39 (H) 03/28/2023    ALT 28 03/28/2023         Imaging: No results found.      Assessment:  Marcell Dickson is a 52 y.o. female here with:  1. Chronic constipation    2. Generalized abdominal pain    3. Personal history of colonic polyps    4. Screening for colon cancer          Recommendations:  1. Xray KUB to assess stool burden  2. Increase Linzess to 290 mcg daily   3. Schedule screening colonoscopy due 08/2023    Follow up in about 6 months (around 11/9/2023).      Order summary:  Orders Placed This Encounter    X-Ray KUB    LINZESS 145 mcg Cap capsule    polyethylene glycol (GOLYTELY) 236-22.74-6.74 -5.86 gram suspension    Colonoscopy       Thank you for allowing me to participate in the care of Marcell Dickson.      Maria E Heck, FNP-BC, JOSSELYN-ACNP-BC

## 2023-05-11 RX ORDER — POLYETHYLENE GLYCOL 3350, SODIUM SULFATE ANHYDROUS, SODIUM BICARBONATE, SODIUM CHLORIDE, POTASSIUM CHLORIDE 236; 22.74; 6.74; 5.86; 2.97 G/4L; G/4L; G/4L; G/4L; G/4L
4 POWDER, FOR SOLUTION ORAL ONCE
Qty: 4000 ML | Refills: 0 | Status: SHIPPED | OUTPATIENT
Start: 2023-05-11 | End: 2023-05-11

## 2023-05-15 ENCOUNTER — HOSPITAL ENCOUNTER (OUTPATIENT)
Dept: RADIOLOGY | Facility: HOSPITAL | Age: 53
Discharge: HOME | End: 2023-05-15
Attending: PAIN MEDICINE
Payer: COMMERCIAL

## 2023-05-15 ENCOUNTER — OFFICE VISIT (OUTPATIENT)
Dept: PAIN MEDICINE | Facility: CLINIC | Age: 53
End: 2023-05-15
Payer: COMMERCIAL

## 2023-05-15 VITALS
DIASTOLIC BLOOD PRESSURE: 82 MMHG | SYSTOLIC BLOOD PRESSURE: 106 MMHG | HEIGHT: 67 IN | WEIGHT: 241 LBS | BODY MASS INDEX: 37.83 KG/M2 | HEART RATE: 67 BPM

## 2023-05-15 DIAGNOSIS — M51.36 BULGING LUMBAR DISC: Chronic | ICD-10-CM

## 2023-05-15 DIAGNOSIS — M54.17 LUMBOSACRAL RADICULOPATHY: ICD-10-CM

## 2023-05-15 DIAGNOSIS — Z79.899 ENCOUNTER FOR LONG-TERM (CURRENT) USE OF OTHER MEDICATIONS: Primary | ICD-10-CM

## 2023-05-15 DIAGNOSIS — M46.1 BILATERAL SACROILIITIS: ICD-10-CM

## 2023-05-15 DIAGNOSIS — M79.605 LEFT LEG PAIN: ICD-10-CM

## 2023-05-15 LAB

## 2023-05-15 PROCEDURE — 80361 DRUG SCREEN DEFINITIVE 14, URINE: ICD-10-PCS | Mod: ,,, | Performed by: CLINICAL MEDICAL LABORATORY

## 2023-05-15 PROCEDURE — 80361 OPIATES 1 OR MORE: CPT | Mod: ,,, | Performed by: CLINICAL MEDICAL LABORATORY

## 2023-05-15 PROCEDURE — 72114 X-RAY EXAM L-S SPINE BENDING: CPT | Mod: 26,,, | Performed by: RADIOLOGY

## 2023-05-15 PROCEDURE — 80372 DRUG SCREENING TAPENTADOL: CPT | Mod: ,,, | Performed by: CLINICAL MEDICAL LABORATORY

## 2023-05-15 PROCEDURE — 80365 DRUG SCREENING OXYCODONE: CPT | Mod: ,,, | Performed by: CLINICAL MEDICAL LABORATORY

## 2023-05-15 PROCEDURE — 99204 OFFICE O/P NEW MOD 45 MIN: CPT | Mod: S$PBB,,, | Performed by: PAIN MEDICINE

## 2023-05-15 PROCEDURE — 80353 DRUG SCREENING COCAINE: CPT | Mod: ,,, | Performed by: CLINICAL MEDICAL LABORATORY

## 2023-05-15 PROCEDURE — 80356 DRUG SCREEN DEFINITIVE 14, URINE: ICD-10-PCS | Mod: ,,, | Performed by: CLINICAL MEDICAL LABORATORY

## 2023-05-15 PROCEDURE — 3079F DIAST BP 80-89 MM HG: CPT | Mod: ,,, | Performed by: PAIN MEDICINE

## 2023-05-15 PROCEDURE — 3008F PR BODY MASS INDEX (BMI) DOCUMENTED: ICD-10-PCS | Mod: ,,, | Performed by: PAIN MEDICINE

## 2023-05-15 PROCEDURE — 80349 CANNABINOIDS NATURAL: CPT | Mod: ,,, | Performed by: CLINICAL MEDICAL LABORATORY

## 2023-05-15 PROCEDURE — 1159F MED LIST DOCD IN RCRD: CPT | Mod: ,,, | Performed by: PAIN MEDICINE

## 2023-05-15 PROCEDURE — 3066F PR DOCUMENTATION OF TREATMENT FOR NEPHROPATHY: ICD-10-PCS | Mod: ,,, | Performed by: PAIN MEDICINE

## 2023-05-15 PROCEDURE — 99204 PR OFFICE/OUTPT VISIT, NEW, LEVL IV, 45-59 MIN: ICD-10-PCS | Mod: S$PBB,,, | Performed by: PAIN MEDICINE

## 2023-05-15 PROCEDURE — 80358 DRUG SCREEN DEFINITIVE 14, URINE: ICD-10-PCS | Mod: ,,, | Performed by: CLINICAL MEDICAL LABORATORY

## 2023-05-15 PROCEDURE — 3044F PR MOST RECENT HEMOGLOBIN A1C LEVEL <7.0%: ICD-10-PCS | Mod: ,,, | Performed by: PAIN MEDICINE

## 2023-05-15 PROCEDURE — 80373 DRUG SCREENING TRAMADOL: CPT | Mod: ,,, | Performed by: CLINICAL MEDICAL LABORATORY

## 2023-05-15 PROCEDURE — 3061F NEG MICROALBUMINURIA REV: CPT | Mod: ,,, | Performed by: PAIN MEDICINE

## 2023-05-15 PROCEDURE — 80349 DRUG SCREEN DEFINITIVE 14, URINE: ICD-10-PCS | Mod: ,,, | Performed by: CLINICAL MEDICAL LABORATORY

## 2023-05-15 PROCEDURE — 72114 XR LUMBAR SPINE 5 VIEW WITH FLEX AND EXT: ICD-10-PCS | Mod: 26,,, | Performed by: RADIOLOGY

## 2023-05-15 PROCEDURE — 80356 HEROIN METABOLITE: CPT | Mod: ,,, | Performed by: CLINICAL MEDICAL LABORATORY

## 2023-05-15 PROCEDURE — 80354 DRUG SCREEN DEFINITIVE 14, URINE: ICD-10-PCS | Mod: ,,, | Performed by: CLINICAL MEDICAL LABORATORY

## 2023-05-15 PROCEDURE — 99215 OFFICE O/P EST HI 40 MIN: CPT | Mod: PBBFAC | Performed by: PAIN MEDICINE

## 2023-05-15 PROCEDURE — 80372 DRUG SCREEN DEFINITIVE 14, URINE: ICD-10-PCS | Mod: ,,, | Performed by: CLINICAL MEDICAL LABORATORY

## 2023-05-15 PROCEDURE — 80365 DRUG SCREEN DEFINITIVE 14, URINE: ICD-10-PCS | Mod: ,,, | Performed by: CLINICAL MEDICAL LABORATORY

## 2023-05-15 PROCEDURE — 1159F PR MEDICATION LIST DOCUMENTED IN MEDICAL RECORD: ICD-10-PCS | Mod: ,,, | Performed by: PAIN MEDICINE

## 2023-05-15 PROCEDURE — 80358 DRUG SCREENING METHADONE: CPT | Mod: ,,, | Performed by: CLINICAL MEDICAL LABORATORY

## 2023-05-15 PROCEDURE — 72202 X-RAY EXAM SI JOINTS 3/> VWS: CPT | Mod: 26,,, | Performed by: RADIOLOGY

## 2023-05-15 PROCEDURE — 3079F PR MOST RECENT DIASTOLIC BLOOD PRESSURE 80-89 MM HG: ICD-10-PCS | Mod: ,,, | Performed by: PAIN MEDICINE

## 2023-05-15 PROCEDURE — 72202 X-RAY EXAM SI JOINTS 3/> VWS: CPT | Mod: TC

## 2023-05-15 PROCEDURE — 72114 X-RAY EXAM L-S SPINE BENDING: CPT | Mod: TC

## 2023-05-15 PROCEDURE — 3044F HG A1C LEVEL LT 7.0%: CPT | Mod: ,,, | Performed by: PAIN MEDICINE

## 2023-05-15 PROCEDURE — 80354 DRUG SCREENING FENTANYL: CPT | Mod: ,,, | Performed by: CLINICAL MEDICAL LABORATORY

## 2023-05-15 PROCEDURE — 3061F PR NEG MICROALBUMINURIA RESULT DOCUMENTED/REVIEW: ICD-10-PCS | Mod: ,,, | Performed by: PAIN MEDICINE

## 2023-05-15 PROCEDURE — 3074F SYST BP LT 130 MM HG: CPT | Mod: ,,, | Performed by: PAIN MEDICINE

## 2023-05-15 PROCEDURE — 80348 DRUG SCREEN DEFINITIVE 14, URINE: ICD-10-PCS | Mod: ,,, | Performed by: CLINICAL MEDICAL LABORATORY

## 2023-05-15 PROCEDURE — 3008F BODY MASS INDEX DOCD: CPT | Mod: ,,, | Performed by: PAIN MEDICINE

## 2023-05-15 PROCEDURE — 80305 DRUG TEST PRSMV DIR OPT OBS: CPT | Mod: PBBFAC | Performed by: PAIN MEDICINE

## 2023-05-15 PROCEDURE — 80346 DRUG SCREEN DEFINITIVE 14, URINE: ICD-10-PCS | Mod: ,,, | Performed by: CLINICAL MEDICAL LABORATORY

## 2023-05-15 PROCEDURE — 80348 DRUG SCREENING BUPRENORPHINE: CPT | Mod: ,,, | Performed by: CLINICAL MEDICAL LABORATORY

## 2023-05-15 PROCEDURE — 80346 BENZODIAZEPINES1-12: CPT | Mod: ,,, | Performed by: CLINICAL MEDICAL LABORATORY

## 2023-05-15 PROCEDURE — 80324 DRUG SCREEN DEFINITIVE 14, URINE: ICD-10-PCS | Mod: ,,, | Performed by: CLINICAL MEDICAL LABORATORY

## 2023-05-15 PROCEDURE — 3066F NEPHROPATHY DOC TX: CPT | Mod: ,,, | Performed by: PAIN MEDICINE

## 2023-05-15 PROCEDURE — 72202 XR SACROILIAC JOINTS COMPLETE: ICD-10-PCS | Mod: 26,,, | Performed by: RADIOLOGY

## 2023-05-15 PROCEDURE — 80373 DRUG SCREEN DEFINITIVE 14, URINE: ICD-10-PCS | Mod: ,,, | Performed by: CLINICAL MEDICAL LABORATORY

## 2023-05-15 PROCEDURE — 80353 DRUG SCREEN DEFINITIVE 14, URINE: ICD-10-PCS | Mod: ,,, | Performed by: CLINICAL MEDICAL LABORATORY

## 2023-05-15 PROCEDURE — 80324 DRUG SCREEN AMPHETAMINES 1/2: CPT | Mod: ,,, | Performed by: CLINICAL MEDICAL LABORATORY

## 2023-05-15 PROCEDURE — 3074F PR MOST RECENT SYSTOLIC BLOOD PRESSURE < 130 MM HG: ICD-10-PCS | Mod: ,,, | Performed by: PAIN MEDICINE

## 2023-05-15 RX ORDER — GABAPENTIN 300 MG/1
300 CAPSULE ORAL 3 TIMES DAILY
Qty: 90 CAPSULE | Refills: 0 | Status: SHIPPED | OUTPATIENT
Start: 2023-05-15 | End: 2023-06-14 | Stop reason: SDUPTHER

## 2023-05-15 NOTE — PROGRESS NOTES
"Chronic Pain - New Consult    Referring Physician: Richard Rodriguez*       SUBJECTIVE: Disclaimer: This note has been generated using voice-recognition software. There may be typographical errors that have been missed during proof-reading      Initial encounter:    Marcell SimonanielAntonioGracia presents to the clinic for the evaluation of lower back pain.       52-year-old female presents for new patient evaluation and  consultation from Dr. Iva Wild.  Patient reports a long history of lower back pain for several years.  She received injections many years ago with some relief.  The pain has worsened over time and radiates from the lower back to the left lower extremity and calf.  She denies paresthesia or weakness.  She denies recent diagnostic such as x-rays, MRIs or CT scans of the lumbar spine. Physical therapy was several years ago.  Naproxen and gabapentin are providing only marginal relief.  She has not received a surgical consultation.      Pain Assessment  Pain Assessment: 0-10  Pain Score: 0-No pain  Pain Location: Other (Comment) (lower back,  left leg pain)  Pain Descriptors: Aching  Pain Frequency: Intermittent  Aggravating Factors: Walking      Physical Therapy/Home Exercise: no        Pain Medications:  has a current medication list which includes the following prescription(s): cetirizine, ergocalciferol, furosemide, gabapentin, gabapentin, hydrochlorothiazide, hydroxyzine pamoate, linzess, naproxen, pantoprazole, potassium chloride sa, and trazodone.      Tried in Past:  NSAIDS-yes  TCA-no  SNRI-no  Anti-convulsants-yes  Muscle Relaxants-no  Opioids-no  Benzodiazepines-no     4A"s of Opioid Risk Assessment  Activity: Patient has difficulty perform  ADL  Analgesia:  Patient's pain is partially controlled by current medication.   Aberrant Behavior:  reviewed with no aberrant drug seeking/taking behavior     report:  Reviewed and consistent with medication use as prescribed.    Patient " denies suicidal or homicidal ideations    Pain interventional therapy-yes, several years ago    Chiropractor -no  Acupuncture - no  TENS unit -no  Spinal decompression -no  Joint replacement -no     Review of Systems   Constitutional: Negative.    HENT: Negative.     Eyes: Negative.    Respiratory: Negative.     Cardiovascular: Negative.    Gastrointestinal: Negative.    Endocrine: Negative.    Genitourinary: Negative.    Musculoskeletal:  Positive for arthralgias, back pain and leg pain (Left lower extremity).   Integumentary:  Negative.   Neurological: Negative.    Hematological: Negative.    Psychiatric/Behavioral: Negative.             X-Ray KUB  Narrative: EXAMINATION:  XR KUB    CLINICAL HISTORY:  Generalized abdominal pain    TECHNIQUE:  Single AP supine view of the abdomen (KUB) was performed    COMPARISON:  None    FINDINGS:  Mild colonic stool.  No bowel obstruction.  Surgical clips right upper quadrant and the pelvis.  Impression: Mild colonic stool.    Electronically signed by: Hong Carcamo  Date:    05/09/2023  Time:    10:18         Past Medical History:   Diagnosis Date    Arthritis     GERD (gastroesophageal reflux disease)     History of rectal polyps 08/07/2018     Past Surgical History:   Procedure Laterality Date    breast reduction      CHOLECYSTECTOMY  10/28/2013    Dr. Paige    COLONOSCOPY  2018    gastric sleeve      HYSTERECTOMY  10/28/2013    Robotic, BSO,Cystoscopy, TOT-    OOPHORECTOMY      TUBAL LIGATION      tummy tuck       Social History     Socioeconomic History    Marital status:    Tobacco Use    Smoking status: Never     Passive exposure: Never    Smokeless tobacco: Never   Substance and Sexual Activity    Alcohol use: Yes     Comment: occasionally    Drug use: Never    Sexual activity: Yes     Family History   Problem Relation Age of Onset    Colon cancer Sister      Review of patient's allergies indicates:   Allergen Reactions    Sulfa (sulfonamide antibiotics)   "        OBJECTIVE:  Vitals:    05/15/23 1402   BP: 106/82   Pulse: 67     /82   Pulse 67   Ht 5' 7" (1.702 m)   Wt 109.3 kg (241 lb)   BMI 37.75 kg/m²   Physical Exam  Vitals and nursing note reviewed.   Constitutional:       General: She is not in acute distress.     Appearance: Normal appearance. She is not ill-appearing, toxic-appearing or diaphoretic.   HENT:      Head: Normocephalic and atraumatic.      Nose: Nose normal.      Mouth/Throat:      Mouth: Mucous membranes are moist.   Eyes:      Extraocular Movements: Extraocular movements intact.      Pupils: Pupils are equal, round, and reactive to light.   Cardiovascular:      Rate and Rhythm: Normal rate and regular rhythm.      Heart sounds: Normal heart sounds.   Pulmonary:      Effort: Pulmonary effort is normal. No respiratory distress.      Breath sounds: Normal breath sounds. No stridor. No wheezing or rhonchi.   Abdominal:      General: Bowel sounds are normal.      Palpations: Abdomen is soft.   Musculoskeletal:         General: No swelling or deformity.      Cervical back: Normal and normal range of motion. No spasms or tenderness. No pain with movement. Normal range of motion.      Thoracic back: Normal.      Lumbar back: Tenderness and bony tenderness present. No spasms. Decreased range of motion. Positive left straight leg raise test. Negative right straight leg raise test. No scoliosis.      Right lower leg: No edema.      Left lower leg: No edema.   Skin:     General: Skin is warm.   Neurological:      General: No focal deficit present.      Mental Status: She is alert and oriented to person, place, and time. Mental status is at baseline.      Cranial Nerves: No cranial nerve deficit.      Sensory: Sensation is intact. No sensory deficit.      Motor: No weakness.      Coordination: Coordination normal.      Gait: Gait normal.      Deep Tendon Reflexes: Reflexes are normal and symmetric.   Psychiatric:         Mood and Affect: Mood " normal.         Behavior: Behavior normal.          ASSESSMENT: 52 y.o. year old female with pain, consistent with     Encounter Diagnoses   Name Primary?    Bulging lumbar disc     Left leg pain     Encounter for long-term (current) use of other medications Yes    Lumbosacral radiculopathy     Bilateral sacroiliitis         PLAN:   1. reviewed  2..Addiction, Dependency, Tolerance, Opioid abuse-misuse, Death, Diversion Discussed. Overdose reversal drug Naloxone discussed  3. Opioid contract signed today  4.Refill/ Continue medications for pain control and function.  Increase gabapentin 300 mg t.i.d. as tolerated       Requested Prescriptions     Signed Prescriptions Disp Refills    gabapentin (NEURONTIN) 300 MG capsule 90 capsule 0     Sig: Take 1 capsule (300 mg total) by mouth 3 (three) times daily.     5. Obtain x-ray of the lumbar spine and SI joints  6.Urine drug screen and confirmation testing was ordered as documented on the requisition form in order to verify medication compliance, test for illicit substances.  7. Start physical therapy 2 to 3 times week x6 weeks for intractable lower back and lumbar radicular symptoms  Orders Placed This Encounter   Procedures    X-Ray Lumbar Complete Including Flex And Ext     Standing Status:   Future     Number of Occurrences:   1     Standing Expiration Date:   5/15/2024     Order Specific Question:   May the Radiologist modify the order per protocol to meet the clinical needs of the patient?     Answer:   Yes    X-Ray Sacroiliac Joints Complete     Standing Status:   Future     Number of Occurrences:   1     Standing Expiration Date:   5/15/2024     Order Specific Question:   May the Radiologist modify the order per protocol to meet the clinical needs of the patient?     Answer:   Yes     Order Specific Question:   Release to patient     Answer:   Immediate    Drug Screen Definitive 14, Urine     Standing Status:   Future     Number of Occurrences:   1     Standing  Expiration Date:   7/13/2024     Order Specific Question:   Specimen Source     Answer:   Urine    Ambulatory referral/consult to Physical/Occupational Therapy     Standing Status:   Future     Standing Expiration Date:   6/15/2024     Referral Priority:   Routine     Referral Type:   Physical Medicine     Referral Reason:   Specialty Services Required     Requested Specialty:   Physical Therapy     Number of Visits Requested:   1    POCT Urine Drug Screen Presump     Interpretive Information:     Negative:  No drug detected at the cut off level.   Positive:  This result represents presumptive positive for the   tested drug, other substances may yield a positive response other   than the analyte of interest. This result should be utilized for   diagnostic purpose only. Confirmation testing will be performed upon physician request only.         8. Consider MRI if the completion of physical therapy  9.Follow with ADELA Orozco in 4-6 weeks for re-evaluation and medication refill      The total time spent for evaluation and management on 05/15/2023 including reviewing separately obtained history, performing a medically appropriate exam and evaluation, documenting clinical information in the health record, independently interpreting results and communicating them to the patient/family/caregiver, and ordering medications/tests/procedures was between 15-29 minutes.    The above plan and management options were discussed at length with patient. Patient is in agreement with the above and verbalized understanding. It will be communicated with the referring physician via electronic record, fax, or mail.    Doreen Schulz  05/15/2023

## 2023-05-16 ENCOUNTER — PATIENT MESSAGE (OUTPATIENT)
Dept: FAMILY MEDICINE | Facility: CLINIC | Age: 53
End: 2023-05-16
Payer: COMMERCIAL

## 2023-05-17 LAB
6-ACETYLMORPHINE, URINE (RUSH): NEGATIVE 10 NG/ML
7-AMINOCLONAZEPAM, URINE (RUSH): NEGATIVE 25 NG/ML
A-HYDROXYALPRAZOLAM, URINE (RUSH): NEGATIVE 25 NG/ML
ACETYL FENTANYL, URINE (RUSH): NEGATIVE 2.5 NG/ML
ACETYL NORFENTANYL OXALATE, URINE (RUSH): NEGATIVE 5 NG/ML
AMPHET UR QL SCN: NEGATIVE
BENZOYLECGONINE, URINE (RUSH): NEGATIVE 100 NG/ML
BUPRENORPHINE UR QL SCN: NEGATIVE 25 NG/ML
CODEINE, URINE (RUSH): NEGATIVE 25 NG/ML
CREAT UR-MCNC: 39 MG/DL (ref 28–219)
EDDP, URINE (RUSH): NEGATIVE 25 NG/ML
FENTANYL, URINE (RUSH): NEGATIVE 2.5 NG/ML
HYDROCODONE, URINE (RUSH): NEGATIVE 25 NG/ML
HYDROMORPHONE, URINE (RUSH): NEGATIVE 25 NG/ML
LORAZEPAM, URINE (RUSH): NEGATIVE 25 NG/ML
METHADONE UR QL SCN: NEGATIVE 25 NG/ML
METHAMPHET UR QL SCN: NEGATIVE
MORPHINE, URINE (RUSH): NEGATIVE 25 NG/ML
NORBUPRENORPHINE, URINE (RUSH): NEGATIVE 25 NG/ML
NORDIAZEPAM, URINE (RUSH): NEGATIVE 25 NG/ML
NORFENTANYL OXALATE, URINE (RUSH): NEGATIVE 5 NG/ML
NORHYDROCODONE, URINE (RUSH): NEGATIVE 50 NG/ML
NOROXYCODONE HCL, URINE (RUSH): NEGATIVE 50 NG/ML
OXAZEPAM, URINE (RUSH): NEGATIVE 25 NG/ML
OXYCODONE UR QL SCN: NEGATIVE 25 NG/ML
OXYMORPHONE, URINE (RUSH): NEGATIVE 25 NG/ML
PH UR STRIP: 5.5 PH UNITS
SP GR UR STRIP: 1.01
TAPENTADOL, URINE (RUSH): NEGATIVE 25 NG/ML
TEMAZEPAM, URINE (RUSH): NEGATIVE 25 NG/ML
THC-COOH, URINE (RUSH): NEGATIVE 25 NG/ML
TRAMADOL, URINE (RUSH): NEGATIVE 100 NG/ML

## 2023-05-17 RX ORDER — SEMAGLUTIDE 0.25 MG/.5ML
0.25 INJECTION, SOLUTION SUBCUTANEOUS
Qty: 2 ML | Refills: 2 | Status: SHIPPED | OUTPATIENT
Start: 2023-05-17 | End: 2023-05-22 | Stop reason: SDUPTHER

## 2023-05-18 ENCOUNTER — PATIENT MESSAGE (OUTPATIENT)
Dept: FAMILY MEDICINE | Facility: CLINIC | Age: 53
End: 2023-05-18
Payer: COMMERCIAL

## 2023-05-22 ENCOUNTER — PATIENT MESSAGE (OUTPATIENT)
Dept: FAMILY MEDICINE | Facility: CLINIC | Age: 53
End: 2023-05-22
Payer: COMMERCIAL

## 2023-05-22 RX ORDER — SEMAGLUTIDE 0.25 MG/.5ML
0.25 INJECTION, SOLUTION SUBCUTANEOUS
Qty: 2 ML | Refills: 2 | Status: SHIPPED | OUTPATIENT
Start: 2023-05-22 | End: 2023-06-14 | Stop reason: DRUGHIGH

## 2023-06-12 ENCOUNTER — TELEPHONE (OUTPATIENT)
Dept: PAIN MEDICINE | Facility: CLINIC | Age: 53
End: 2023-06-12
Payer: COMMERCIAL

## 2023-06-12 NOTE — TELEPHONE ENCOUNTER
----- Message from Doreen Schulz MD sent at 6/12/2023 11:29 AM CDT -----  Regarding: FW: Physical Therapy  Patient can discontinue PT if she fails to benefit  ----- Message -----  From: Avtar Escobar LPN  Sent: 6/8/2023   1:28 PM CDT  To: Doreen Schulz MD  Subject: Physical Therapy                                 Patient states has had 10 visits to Physical Therapy since last visit on 5/25/23 and she has experience no relief.  She states has 8 more visits scheduled with Physical Therapy and pays out of pocket.  Her follow up visit is scheduled with Zee CHAPMAN on 6/26/23.  Can she stop PT?

## 2023-06-12 NOTE — TELEPHONE ENCOUNTER
Patient is instructed that she can stop the physical therapy per Dr. Schulz.  Patient verbalizes understanding.

## 2023-06-12 NOTE — TELEPHONE ENCOUNTER
----- Message from Sia Figueroa sent at 6/8/2023 11:07 AM CDT -----  [phone] PATIENT SAID SHE HAS BEEN GOING TO PHYSICAL THERAPY AND IT IS NOT WORKING SHE SAYS SHE HAS 8 MORE VISITS WHAT DOES SHE NEED TO DO. SHE IS HAVING TO PAY EVERY TIME SHE GOES

## 2023-06-14 ENCOUNTER — OFFICE VISIT (OUTPATIENT)
Dept: FAMILY MEDICINE | Facility: CLINIC | Age: 53
End: 2023-06-14
Payer: COMMERCIAL

## 2023-06-14 VITALS
WEIGHT: 244 LBS | DIASTOLIC BLOOD PRESSURE: 66 MMHG | OXYGEN SATURATION: 95 % | HEART RATE: 86 BPM | SYSTOLIC BLOOD PRESSURE: 95 MMHG | BODY MASS INDEX: 39.21 KG/M2 | RESPIRATION RATE: 18 BRPM | TEMPERATURE: 98 F | HEIGHT: 66 IN

## 2023-06-14 DIAGNOSIS — M62.838 MUSCLE SPASM: ICD-10-CM

## 2023-06-14 DIAGNOSIS — G62.9 NEUROPATHY: ICD-10-CM

## 2023-06-14 DIAGNOSIS — E66.09 CLASS 2 OBESITY DUE TO EXCESS CALORIES WITHOUT SERIOUS COMORBIDITY WITH BODY MASS INDEX (BMI) OF 36.0 TO 36.9 IN ADULT: Primary | Chronic | ICD-10-CM

## 2023-06-14 DIAGNOSIS — R60.0 LOCALIZED EDEMA: ICD-10-CM

## 2023-06-14 DIAGNOSIS — J01.00 ACUTE NON-RECURRENT MAXILLARY SINUSITIS: ICD-10-CM

## 2023-06-14 DIAGNOSIS — K59.09 CHRONIC CONSTIPATION: Chronic | ICD-10-CM

## 2023-06-14 PROCEDURE — 1160F PR REVIEW ALL MEDS BY PRESCRIBER/CLIN PHARMACIST DOCUMENTED: ICD-10-PCS | Mod: ,,, | Performed by: FAMILY MEDICINE

## 2023-06-14 PROCEDURE — 3044F HG A1C LEVEL LT 7.0%: CPT | Mod: ,,, | Performed by: FAMILY MEDICINE

## 2023-06-14 PROCEDURE — 3061F PR NEG MICROALBUMINURIA RESULT DOCUMENTED/REVIEW: ICD-10-PCS | Mod: ,,, | Performed by: FAMILY MEDICINE

## 2023-06-14 PROCEDURE — 1160F RVW MEDS BY RX/DR IN RCRD: CPT | Mod: ,,, | Performed by: FAMILY MEDICINE

## 2023-06-14 PROCEDURE — 3044F PR MOST RECENT HEMOGLOBIN A1C LEVEL <7.0%: ICD-10-PCS | Mod: ,,, | Performed by: FAMILY MEDICINE

## 2023-06-14 PROCEDURE — 1159F MED LIST DOCD IN RCRD: CPT | Mod: ,,, | Performed by: FAMILY MEDICINE

## 2023-06-14 PROCEDURE — 99214 OFFICE O/P EST MOD 30 MIN: CPT | Mod: ,,, | Performed by: FAMILY MEDICINE

## 2023-06-14 PROCEDURE — 1159F PR MEDICATION LIST DOCUMENTED IN MEDICAL RECORD: ICD-10-PCS | Mod: ,,, | Performed by: FAMILY MEDICINE

## 2023-06-14 PROCEDURE — 3066F PR DOCUMENTATION OF TREATMENT FOR NEPHROPATHY: ICD-10-PCS | Mod: ,,, | Performed by: FAMILY MEDICINE

## 2023-06-14 PROCEDURE — 99214 PR OFFICE/OUTPT VISIT, EST, LEVL IV, 30-39 MIN: ICD-10-PCS | Mod: ,,, | Performed by: FAMILY MEDICINE

## 2023-06-14 PROCEDURE — 3078F DIAST BP <80 MM HG: CPT | Mod: ,,, | Performed by: FAMILY MEDICINE

## 2023-06-14 PROCEDURE — 3074F PR MOST RECENT SYSTOLIC BLOOD PRESSURE < 130 MM HG: ICD-10-PCS | Mod: ,,, | Performed by: FAMILY MEDICINE

## 2023-06-14 PROCEDURE — 3074F SYST BP LT 130 MM HG: CPT | Mod: ,,, | Performed by: FAMILY MEDICINE

## 2023-06-14 PROCEDURE — 3008F BODY MASS INDEX DOCD: CPT | Mod: ,,, | Performed by: FAMILY MEDICINE

## 2023-06-14 PROCEDURE — 3078F PR MOST RECENT DIASTOLIC BLOOD PRESSURE < 80 MM HG: ICD-10-PCS | Mod: ,,, | Performed by: FAMILY MEDICINE

## 2023-06-14 PROCEDURE — 3066F NEPHROPATHY DOC TX: CPT | Mod: ,,, | Performed by: FAMILY MEDICINE

## 2023-06-14 PROCEDURE — 3061F NEG MICROALBUMINURIA REV: CPT | Mod: ,,, | Performed by: FAMILY MEDICINE

## 2023-06-14 PROCEDURE — 3008F PR BODY MASS INDEX (BMI) DOCUMENTED: ICD-10-PCS | Mod: ,,, | Performed by: FAMILY MEDICINE

## 2023-06-14 RX ORDER — FLUCONAZOLE 150 MG/1
TABLET ORAL
Qty: 3 TABLET | Refills: 0 | Status: SHIPPED | OUTPATIENT
Start: 2023-06-14 | End: 2023-09-22

## 2023-06-14 RX ORDER — FUROSEMIDE 20 MG/1
20 TABLET ORAL DAILY PRN
Qty: 90 TABLET | Refills: 1 | Status: SHIPPED | OUTPATIENT
Start: 2023-06-14 | End: 2023-09-19 | Stop reason: SDUPTHER

## 2023-06-14 RX ORDER — METHOCARBAMOL 500 MG/1
500 TABLET, FILM COATED ORAL 3 TIMES DAILY PRN
Qty: 45 TABLET | Refills: 0 | Status: ON HOLD | OUTPATIENT
Start: 2023-06-14 | End: 2023-09-28

## 2023-06-14 RX ORDER — SEMAGLUTIDE 0.5 MG/.5ML
0.5 INJECTION, SOLUTION SUBCUTANEOUS
Qty: 0.5 ML | Refills: 3 | Status: SHIPPED | OUTPATIENT
Start: 2023-06-14 | End: 2023-06-15 | Stop reason: SDUPTHER

## 2023-06-14 RX ORDER — GABAPENTIN 300 MG/1
300 CAPSULE ORAL NIGHTLY
Qty: 90 CAPSULE | Refills: 0 | Status: SHIPPED | OUTPATIENT
Start: 2023-06-14 | End: 2023-09-22

## 2023-06-14 RX ORDER — CHLORPHENIRAMINE MALEATE, DEXTROMETHORPHAN HYDROBROMIDE, AND PHENYLEPHRINE HYDROCHLORIDE 4; 10; 10 MG/1; MG/1; MG/1
1 TABLET, COATED ORAL EVERY 8 HOURS PRN
Qty: 30 TABLET | Refills: 0 | Status: SHIPPED | OUTPATIENT
Start: 2023-06-14 | End: 2023-09-22

## 2023-06-14 RX ORDER — AMOXICILLIN AND CLAVULANATE POTASSIUM 875; 125 MG/1; MG/1
1 TABLET, FILM COATED ORAL EVERY 12 HOURS
Qty: 20 TABLET | Refills: 0 | Status: SHIPPED | OUTPATIENT
Start: 2023-06-14 | End: 2023-06-24

## 2023-06-14 RX ORDER — LINACLOTIDE 145 UG/1
290 CAPSULE, GELATIN COATED ORAL DAILY
Qty: 90 CAPSULE | Refills: 1 | Status: CANCELLED | OUTPATIENT
Start: 2023-06-14

## 2023-06-14 NOTE — PROGRESS NOTES
Care gaps discussed with patient.   Patient states she is up to date on HIV screening.   Patient refused tetanus vaccine.

## 2023-06-14 NOTE — PROGRESS NOTES
Clinic Note    Patient Name: Marcell Dickson  : 1970  MRN: [id number]    Chief Complaint   Patient presents with    Follow-up     6 month follow up     Referral     Referral to neurology for nerves    Left side pain     Patient states possible sciatica of the left side      Eye Pain     Pressure behind the left eye.     weight problem       HPI:    Ms. Marcell Dickson is a 52 y.o. female who presents to clinic today with CC of follow up on OA/numbness/tingling in hands and hiatal hernia/nausea/vomiting.  Patient was previously referred for nerve conduction study for neuropathy symptoms in bilateral hands. Reports she has not yet heard from this appt. She was also referred to Pain Management. She has seen Dr. Schulz and is scheduled to follow up next week. Reports Pain Management referred her to PT but this is not helping. She was also referred to GI. Reports they increased linzess. Reports she does feel like this has helped some with bloating.  Patient also reports pressure behind L eye. Reports it could be sinus.   She is also concerned about 40 pound weight gain over the past year.  Patient is, otherwise, without complaints.     Medications:  Medication List with Changes/Refills   New Medications    AMOXICILLIN-CLAVULANATE 875-125MG (AUGMENTIN) 875-125 MG PER TABLET    Take 1 tablet by mouth every 12 (twelve) hours. for 10 days    CHLORPHENIRAMINE-PHENYLEPH-DM (ED A-HIST DM) 4-10-10 MG TAB    Take 1 tablet by mouth every 8 (eight) hours as needed (congestion/cough/sinus pressure).    FLUCONAZOLE (DIFLUCAN) 150 MG TAB    Take one tablet by mouth every 72 hours X 3 doses if needed for yeast infection after completion of antibiotics.    LINACLOTIDE (LINZESS) 290 MCG CAP CAPSULE    Take 1 capsule (290 mcg total) by mouth before breakfast.    METHOCARBAMOL (ROBAXIN) 500 MG TAB    Take 1 tablet (500 mg total) by mouth 3 (three) times daily as needed (muscle spasm/pain. May cause drowsiness.).     SEMAGLUTIDE, WEIGHT LOSS, (WEGOVY) 0.5 MG/0.5 ML PNIJ    Inject 0.5 mg into the skin every 7 days.   Current Medications    CETIRIZINE (ZYRTEC) 10 MG TABLET    Take 1 tablet (10 mg total) by mouth once daily.    ERGOCALCIFEROL (ERGOCALCIFEROL) 50,000 UNIT CAP    Take 1 capsule (50,000 Units total) by mouth every 7 days.    HYDROCHLOROTHIAZIDE (HYDRODIURIL) 25 MG TABLET    Take 1 tablet (25 mg total) by mouth once daily.    HYDROXYZINE PAMOATE (VISTARIL) 25 MG CAP    Take 1 capsule (25 mg total) by mouth every 8 (eight) hours as needed (itching or anxiety).    NAPROXEN (NAPROSYN) 500 MG TABLET    Take 1 tablet (500 mg total) by mouth 2 (two) times daily as needed (joint pain. Take with food.).    PANTOPRAZOLE (PROTONIX) 40 MG TABLET    Take 1 tablet (40 mg total) by mouth once daily.    TRAZODONE (DESYREL) 100 MG TABLET    Take 1 tablet (100 mg total) by mouth every evening.   Changed and/or Refilled Medications    Modified Medication Previous Medication    FUROSEMIDE (LASIX) 20 MG TABLET furosemide (LASIX) 20 MG tablet       Take 1 tablet (20 mg total) by mouth daily as needed (swelling).    Take 1 tablet (20 mg total) by mouth daily as needed (swelling).    GABAPENTIN (NEURONTIN) 300 MG CAPSULE gabapentin (NEURONTIN) 300 MG capsule       Take 1 capsule (300 mg total) by mouth every evening.    Take 1 capsule (300 mg total) by mouth 3 (three) times daily.   Discontinued Medications    GABAPENTIN (NEURONTIN) 100 MG CAPSULE    Take one tablet by mouth at night X 1 week. Then gradually increase to three times daily as needed as tolerated for nerve pain.    LINZESS 145 MCG CAP CAPSULE    Take 2 capsules (290 mcg total) by mouth once daily.    POTASSIUM CHLORIDE SA (K-DUR,KLOR-CON) 20 MEQ TABLET    Take 1 tablet (20 mEq total) by mouth 2 (two) times daily.    SEMAGLUTIDE, WEIGHT LOSS, (WEGOVY) 0.25 MG/0.5 ML PNIJ    Inject 0.25 mg into the skin every 7 days.        Allergies: Sulfa (sulfonamide antibiotics)      Past  "Medical History:    Past Medical History:   Diagnosis Date    Arthritis     GERD (gastroesophageal reflux disease)     History of rectal polyps 08/07/2018       Past Surgical History:    Past Surgical History:   Procedure Laterality Date    breast reduction      CHOLECYSTECTOMY  10/28/2013    Dr. Paige    COLONOSCOPY  2018    gastric sleeve      HYSTERECTOMY  10/28/2013    Robotic, BSO,Cystoscopy, TOT-    OOPHORECTOMY      TUBAL LIGATION      tummy tuck           Social History:    Social History     Tobacco Use   Smoking Status Never    Passive exposure: Never   Smokeless Tobacco Never     Social History     Substance and Sexual Activity   Alcohol Use Yes    Comment: occasionally     Social History     Substance and Sexual Activity   Drug Use Never         Family History:    Family History   Problem Relation Age of Onset    Colon cancer Sister        Review of Systems:    Review of Systems   Constitutional:  Negative for appetite change, chills, fatigue, fever and unexpected weight change.   HENT:  Positive for sinus pressure/congestion.    Eyes:  Negative for visual disturbance.   Respiratory:  Negative for cough and shortness of breath.    Cardiovascular:  Negative for chest pain and leg swelling.   Gastrointestinal:  Positive for constipation. Negative for abdominal pain, change in bowel habit, diarrhea, nausea, vomiting and change in bowel habit.        + chronic constipation but symptoms are improved with recent medication changes   Musculoskeletal:  Positive for arthralgias.   Integumentary:  Negative for rash.   Neurological:  Negative for dizziness and headaches.   Psychiatric/Behavioral:  The patient is not nervous/anxious.       Vitals:    Vitals:    06/14/23 1004   BP: 95/66   BP Location: Left arm   Patient Position: Sitting   BP Method: Large (Manual)   Pulse: 86   Resp: 18   Temp: 98 °F (36.7 °C)   TempSrc: Oral   SpO2: 95%   Weight: 110.7 kg (244 lb)   Height: 5' 6" (1.676 m)       Body mass " index is 39.38 kg/m².    Wt Readings from Last 3 Encounters:   06/14/23 1004 110.7 kg (244 lb)   05/15/23 1402 109.3 kg (241 lb)   05/09/23 0817 109.1 kg (240 lb 9.6 oz)        Physical Exam:    Physical Exam  Constitutional:       General: She is not in acute distress.     Appearance: Normal appearance. She is obese.   HENT:      Nose: Nose normal.      Mouth/Throat:      Mouth: Mucous membranes are moist.      Pharynx: Oropharynx is clear.   Eyes:      Conjunctiva/sclera: Conjunctivae normal.   Cardiovascular:      Rate and Rhythm: Normal rate and regular rhythm.      Heart sounds: Normal heart sounds. No murmur heard.  Pulmonary:      Effort: Pulmonary effort is normal. No respiratory distress.      Breath sounds: Normal breath sounds. No wheezing, rhonchi or rales.   Abdominal:      General: Bowel sounds are normal.      Palpations: Abdomen is soft.      Tenderness: There is no abdominal tenderness.   Musculoskeletal:      Cervical back: Neck supple.      Right lower leg: No edema.      Left lower leg: No edema.   Skin:     Findings: No rash.   Neurological:      General: No focal deficit present.      Mental Status: She is alert. Mental status is at baseline.   Psychiatric:         Mood and Affect: Mood normal.         Assessment/Plan:   1. Class 2 obesity due to excess calories without serious comorbidity with body mass index (BMI) of 36.0 to 36.9 in adult  -     semaglutide, weight loss, (WEGOVY) 0.5 mg/0.5 mL PnIj; Inject 0.5 mg into the skin every 7 days.  Dispense: 0.5 mL; Refill: 3 - dose increase - patient reports she has been tolerating it well with no problems or side effects    2. Chronic constipation  -     linaCLOtide (LINZESS) 290 mcg Cap capsule; Take 1 capsule (290 mcg total) by mouth before breakfast.  Dispense: 90 capsule; Refill: 1 - dose recently increased by GI with some improvement in symptoms    3. Localized edema  -     furosemide (LASIX) 20 MG tablet; Take 1 tablet (20 mg total) by mouth  daily as needed (swelling).  Dispense: 90 tablet; Refill: 1    4. Muscle spasm  -     methocarbamoL (ROBAXIN) 500 MG Tab; Take 1 tablet (500 mg total) by mouth 3 (three) times daily as needed (muscle spasm/pain. May cause drowsiness.).  Dispense: 45 tablet; Refill: 0    5. Acute non-recurrent maxillary sinusitis  -     amoxicillin-clavulanate 875-125mg (AUGMENTIN) 875-125 mg per tablet; Take 1 tablet by mouth every 12 (twelve) hours. for 10 days  Dispense: 20 tablet; Refill: 0  -     chlorpheniramine-phenyleph-DM (ED A-HIST DM) 4-10-10 mg Tab; Take 1 tablet by mouth every 8 (eight) hours as needed (congestion/cough/sinus pressure).  Dispense: 30 tablet; Refill: 0  -     fluconazole (DIFLUCAN) 150 MG Tab; Take one tablet by mouth every 72 hours X 3 doses if needed for yeast infection after completion of antibiotics.  Dispense: 3 tablet; Refill: 0    6. Neuropathy  -     gabapentin (NEURONTIN) 300 MG capsule; Take 1 capsule (300 mg total) by mouth every evening.  Dispense: 90 capsule; Refill: 0 - dose recently increased by Pain Management  - Referral clerk to check on scheduling of nerve conduction study         Active Problem List with Overview Notes    Diagnosis Date Noted    Primary hypertension 06/28/2022    Other insomnia 11/03/2022    Class 2 obesity due to excess calories without serious comorbidity with body mass index (BMI) of 36.0 to 36.9 in adult 11/03/2022    Arthritis 06/28/2022    Bulging lumbar disc 05/03/2023    Gastroesophageal reflux disease without esophagitis 11/03/2022    Chronic constipation 06/28/2022    Hiatal hernia 05/03/2023    Seasonal allergic rhinitis 06/28/2022        RTC in 4 months for chronic follow up.  RTC sooner if symptoms worsen or fail to resolve.  Patient voiced understanding and is agreeable to plan.      Aileen Rodriguez MD    Family Medicine

## 2023-06-15 DIAGNOSIS — E66.09 CLASS 2 OBESITY DUE TO EXCESS CALORIES WITHOUT SERIOUS COMORBIDITY WITH BODY MASS INDEX (BMI) OF 36.0 TO 36.9 IN ADULT: Chronic | ICD-10-CM

## 2023-06-15 RX ORDER — SEMAGLUTIDE 0.5 MG/.5ML
0.5 INJECTION, SOLUTION SUBCUTANEOUS
Qty: 4 EACH | Refills: 3 | Status: SHIPPED | OUTPATIENT
Start: 2023-06-15 | End: 2023-07-10 | Stop reason: ALTCHOICE

## 2023-06-15 NOTE — TELEPHONE ENCOUNTER
----- Message from Arlet Rodriguez MD sent at 6/14/2023  5:20 PM CDT -----  Regarding: FW: Med  Can you put this back in cart for me to sign. Thanks!  ----- Message -----  From: Terri Vinson  Sent: 6/14/2023   2:58 PM CDT  To: [facility] Staff  Subject: Med                                              Pt called and said all her medications was sent over to [facility] today except the Mounjaro.

## 2023-06-22 ENCOUNTER — TELEPHONE (OUTPATIENT)
Dept: FAMILY MEDICINE | Facility: CLINIC | Age: 53
End: 2023-06-22
Payer: COMMERCIAL

## 2023-06-22 PROBLEM — M54.17 LUMBOSACRAL RADICULOPATHY: Chronic | Status: ACTIVE | Noted: 2023-06-22

## 2023-06-22 PROBLEM — M89.49 OSTEOARTHROSIS MULTIPLE SITES, NOT SPECIFIED AS GENERALIZED: Chronic | Status: ACTIVE | Noted: 2023-06-22

## 2023-06-22 NOTE — PROGRESS NOTES
Subjective:         Patient ID: Marcell Dickson is a 52 y.o. female.    Chief Complaint: No chief complaint on file.      Pain  This is a chronic problem. The current episode started more than 1 year ago. The problem occurs daily. The problem has been waxing and waning. Associated symptoms include arthralgias. Pertinent negatives include no anorexia, change in bowel habit, chest pain, chills, coughing, diaphoresis, fever, neck pain, rash, sore throat, swollen glands, urinary symptoms, vertigo, visual change or vomiting.   Review of Systems   Constitutional:  Negative for activity change, appetite change, chills, diaphoresis, fever and unexpected weight change.   HENT:  Negative for drooling, ear discharge, ear pain, facial swelling, nosebleeds, sore throat, trouble swallowing, voice change and goiter.    Eyes:  Negative for photophobia, pain, discharge, redness and visual disturbance.   Respiratory:  Negative for apnea, cough, choking, chest tightness, shortness of breath, wheezing and stridor.    Cardiovascular:  Negative for chest pain, palpitations and leg swelling.   Gastrointestinal:  Negative for abdominal distention, anorexia, change in bowel habit, diarrhea, rectal pain, vomiting, fecal incontinence and change in bowel habit.   Endocrine: Negative for cold intolerance, heat intolerance, polydipsia, polyphagia and polyuria.   Genitourinary:  Negative for bladder incontinence, dysuria, flank pain, frequency and hot flashes.   Musculoskeletal:  Positive for arthralgias, back pain and leg pain. Negative for neck pain.   Integumentary:  Negative for color change, pallor and rash.   Allergic/Immunologic: Negative for immunocompromised state.   Neurological:  Negative for dizziness, vertigo, seizures, syncope, facial asymmetry, speech difficulty, light-headedness, coordination difficulties, memory loss and coordination difficulties.   Hematological:  Negative for adenopathy. Does not bruise/bleed  "easily.   Psychiatric/Behavioral:  Negative for agitation, behavioral problems, confusion, decreased concentration, dysphoric mood, hallucinations, self-injury and suicidal ideas. The patient is not nervous/anxious and is not hyperactive.          Past Medical History:   Diagnosis Date    Arthritis     GERD (gastroesophageal reflux disease)     History of rectal polyps 08/07/2018     Past Surgical History:   Procedure Laterality Date    breast reduction      CHOLECYSTECTOMY  10/28/2013    Dr. Paige    COLONOSCOPY  2018    gastric sleeve      HYSTERECTOMY  10/28/2013    Robotic, BSO,Cystoscopy, TOT-    OOPHORECTOMY      TUBAL LIGATION      tummy tuck       Social History     Socioeconomic History    Marital status:    Tobacco Use    Smoking status: Never     Passive exposure: Never    Smokeless tobacco: Never   Substance and Sexual Activity    Alcohol use: Yes     Comment: occasionally    Drug use: Never    Sexual activity: Yes     Family History   Problem Relation Age of Onset    Colon cancer Sister      Review of patient's allergies indicates:   Allergen Reactions    Sulfa (sulfonamide antibiotics)         Objective:  Vitals:    06/26/23 1240   BP: 116/74   Pulse: 79   Resp: 18   Weight: 110 kg (242 lb 9.6 oz)   Height: 5' 6" (1.676 m)   PainSc: 0-No pain         Physical Exam  Vitals and nursing note reviewed. Exam conducted with a chaperone present.   Constitutional:       General: She is awake. She is not in acute distress.     Appearance: Normal appearance. She is not ill-appearing, toxic-appearing or diaphoretic.   HENT:      Head: Normocephalic and atraumatic.      Nose: Nose normal.      Mouth/Throat:      Mouth: Mucous membranes are moist.      Pharynx: Oropharynx is clear.   Eyes:      Conjunctiva/sclera: Conjunctivae normal.      Pupils: Pupils are equal, round, and reactive to light.   Cardiovascular:      Rate and Rhythm: Normal rate.   Pulmonary:      Effort: Pulmonary effort is normal. " No respiratory distress.   Abdominal:      Palpations: Abdomen is soft.      Tenderness: There is no guarding.   Musculoskeletal:         General: Normal range of motion.      Cervical back: Normal range of motion and neck supple. No rigidity.   Skin:     General: Skin is warm and dry.      Coloration: Skin is not jaundiced or pale.   Neurological:      General: No focal deficit present.      Mental Status: She is alert and oriented to person, place, and time. Mental status is at baseline.      Cranial Nerves: No cranial nerve deficit (II-XII).   Psychiatric:         Mood and Affect: Mood normal.         Behavior: Behavior normal. Behavior is cooperative.         Thought Content: Thought content normal.         X-Ray Sacroiliac Joints Complete  Narrative: EXAMINATION:  XR SACROILIAC JOINTS COMPLETE    CLINICAL HISTORY:  Sacroiliitis, not elsewhere classified    TECHNIQUE:  AP and oblique views of the right and left sacroiliac joints    COMPARISON:  None    FINDINGS:  Mild degenerative changes are seen of both sacroiliac joints. No erosions identified. Soft tissues unremarkable.  Impression: Mild degenerative changes.    Electronically signed by: Hong Carcamo  Date:    05/15/2023  Time:    16:26  X-Ray Lumbar Complete Including Flex And Ext  Narrative: EXAMINATION:  XR LUMBAR SPINE WITH FLEX AND EXT    CLINICAL HISTORY:  Radiculopathy, lumbosacral region    TECHNIQUE:  AP and lateral views of the lumbar spine plus flexion extension views were performed.    COMPARISON:  09/14/2017    FINDINGS:  Mild to moderate degenerative endplate and facet changes are seen of the lumbar spine with osteophyte formation.  Vertebral body heights and alignment are maintained.  No change in alignment on dynamic imaging.  Impression: Degenerative changes.    Electronically signed by: Hong Carcamo  Date:    05/15/2023  Time:    16:25       Office Visit on 05/15/2023   Component Date Value Ref Range Status    POC Amphetamines 05/15/2023 Negative   Negative, Inconclusive Final    POC Barbiturates 05/15/2023 Negative  Negative, Inconclusive Final    POC Benzodiazepines 05/15/2023 Negative  Negative, Inconclusive Final    POC Cocaine 05/15/2023 Negative  Negative, Inconclusive Final    POC THC 05/15/2023 Negative  Negative, Inconclusive Final    POC Methadone 05/15/2023 Negative  Negative, Inconclusive Final    POC Methamphetamine 05/15/2023 Negative  Negative, Inconclusive Final    POC Opiates 05/15/2023 Negative  Negative, Inconclusive Final    POC Oxycodone 05/15/2023 Negative  Negative, Inconclusive Final    POC Phencyclidine 05/15/2023 Negative  Negative, Inconclusive Final    POC Methylenedioxymethamphetamine * 05/15/2023 Negative  Negative, Inconclusive Final    POC Tricyclic Antidepressants 05/15/2023 Negative  Negative, Inconclusive Final    POC Buprenorphine 05/15/2023 Negative   Final     Acceptable 05/15/2023 Yes   Final    POC Temperature (Urine) 05/15/2023 94   Final    pH, UA 05/15/2023 5.5  5.0 to 8.0 pH Units Final    Creatinine, Urine 05/15/2023 39  28 - 219 mg/dL Final    6-Acetylmorphine 05/15/2023 Negative  10 ng/mL Final    7-Aminoclonazepam 05/15/2023 Negative  Negative 25 ng/mL Final    a-Hydroxyalprazolam 05/15/2023 Negative  Negative 25 ng/mL Final    Acetyl Fentanyl 05/15/2023 Negative  2.5 ng/mL Final    Acetyl Norfentanyl Oxalate 05/15/2023 Negative  5 ng/mL Final    Benzoylecgonine 05/15/2023 Negative  100 ng/mL Final    Buprenorphine 05/15/2023 Negative  25 ng/mL Final    Codeine 05/15/2023 Negative  25 ng/mL Final    EDDP 05/15/2023 Negative  25 ng/mL Final    Fentanyl 05/15/2023 Negative  2.5 ng/mL Final    Hydrocodone 05/15/2023 Negative  25 ng/mL Final    Hydromorphone 05/15/2023 Negative  25 ng/mL Final    Lorazepam 05/15/2023 Negative  25 ng/mL Final    Morphine 05/15/2023 Negative  25 ng/mL Final    Norbuprenorphine 05/15/2023 Negative  25 ng/mL Final    Nordiazepam 05/15/2023 Negative  25 ng/mL Final     Norfentanyl Oxalate 05/15/2023 Negative  5 ng/mL Final    Norhydrocodone 05/15/2023 Negative  50 ng/mL Final    Noroxycodone HCL 05/15/2023 Negative  50 ng/mL Final    Oxazepam 05/15/2023 Negative  25 ng/mL Final    Oxymorphone 05/15/2023 Negative  25 ng/mL Final    Tapentadol 05/15/2023 Negative  25 ng/mL Final    Temazepam 05/15/2023 Negative  25 ng/mL Final    THC-COOH 05/15/2023 Negative  25 ng/mL Final    Tramadol 05/15/2023 Negative  100 ng/mL Final    Amphetamine, Urine 05/15/2023 Negative  Negative Final    Methamphetamines, Urine 05/15/2023 Negative  Negative Final    Methadone, Urine 05/15/2023 Negative  Negative 25 ng/mL Final    Oxycodone, Urine 05/15/2023 Negative  Negative 25 ng/mL Final    Specific Gravity, UA 05/15/2023 1.007  <=1.030 Final   Office Visit on 05/03/2023   Component Date Value Ref Range Status    Creatinine, Urine 05/03/2023 36  28 - 219 mg/dL Final    Microalbumin 05/03/2023 <0.5  0.0 - 2.8 mg/dL Final    Microalbumin/Creatinine Ratio 05/03/2023 <13.9  0.0 - 30.0 mg/g Final    Vitamin D 25-Hydroxy, Blood 05/03/2023 26.4  ng/mL Final    Vitamin B12 05/03/2023 478  193 - 986 pg/mL Final    TSH 05/03/2023 1.030  0.358 - 3.740 uIU/mL Final    Free T4 05/03/2023 0.76  0.76 - 1.46 ng/dL Final    Folate 05/03/2023 >20.0 (H)  3.1 - 17.5 ng/mL Final    Sodium 05/03/2023 137  136 - 145 mmol/L Final    Potassium 05/03/2023 3.8  3.5 - 5.1 mmol/L Final    Chloride 05/03/2023 107  98 - 107 mmol/L Final    CO2 05/03/2023 30  21 - 32 mmol/L Final    Anion Gap 05/03/2023 4 (L)  7 - 16 mmol/L Final    Glucose 05/03/2023 80  74 - 106 mg/dL Final    BUN 05/03/2023 14  7 - 18 mg/dL Final    Creatinine 05/03/2023 0.85  0.55 - 1.02 mg/dL Final    BUN/Creatinine Ratio 05/03/2023 16  6 - 20 Final    Calcium 05/03/2023 9.3  8.5 - 10.1 mg/dL Final    eGFR 05/03/2023 82  >=60 mL/min/1.73m2 Final   Office Visit on 03/28/2023   Component Date Value Ref Range Status    Hemoglobin A1C 03/28/2023 4.9  4.5 - 6.6 %  Final    Estimated Average Glucose 03/28/2023 77  mg/dL Final    Glucose, Fasting 03/28/2023 82  74 - 106 mg/dL Final    Triglycerides 03/28/2023 71  35 - 150 mg/dL Final    Cholesterol 03/28/2023 150  0 - 200 mg/dL Final    HDL Cholesterol 03/28/2023 72 (H)  40 - 60 mg/dL Final    Cholesterol/HDL Ratio (Risk Factor) 03/28/2023 2.1   Final    Non-HDL 03/28/2023 78  mg/dL Final    LDL Calculated 03/28/2023 64  mg/dL Final    VLDL 03/28/2023 14  mg/dL Final    Sodium 03/28/2023 140  136 - 145 mmol/L Final    Potassium 03/28/2023 3.2 (L)  3.5 - 5.1 mmol/L Final    Chloride 03/28/2023 102  98 - 107 mmol/L Final    CO2 03/28/2023 32  21 - 32 mmol/L Final    Anion Gap 03/28/2023 9  7 - 16 mmol/L Final    Glucose 03/28/2023 82  74 - 106 mg/dL Final    BUN 03/28/2023 10  7 - 18 mg/dL Final    Creatinine 03/28/2023 0.90  0.55 - 1.02 mg/dL Final    BUN/Creatinine Ratio 03/28/2023 11  6 - 20 Final    Calcium 03/28/2023 9.0  8.5 - 10.1 mg/dL Final    Total Protein 03/28/2023 7.1  6.4 - 8.2 g/dL Final    Albumin 03/28/2023 3.5  3.5 - 5.0 g/dL Final    Globulin 03/28/2023 3.6  2.0 - 4.0 g/dL Final    A/G Ratio 03/28/2023 1.0   Final    Bilirubin, Total 03/28/2023 0.4  >0.0 - 1.2 mg/dL Final    Alk Phos 03/28/2023 91  41 - 108 U/L Final    ALT 03/28/2023 28  13 - 56 U/L Final    AST 03/28/2023 39 (H)  15 - 37 U/L Final    eGFR 03/28/2023 76  >=60 mL/min/1.73m² Final    ProBNP 03/28/2023 37  1 - 125 pg/mL Final    WBC 03/28/2023 4.35 (L)  4.50 - 11.00 K/uL Final    RBC 03/28/2023 3.86 (L)  4.20 - 5.40 M/uL Final    Hemoglobin 03/28/2023 11.7 (L)  12.0 - 16.0 g/dL Final    Hematocrit 03/28/2023 37.9 (L)  38.0 - 47.0 % Final    MCV 03/28/2023 98.2 (H)  80.0 - 96.0 fL Final    MCH 03/28/2023 30.3  27.0 - 31.0 pg Final    MCHC 03/28/2023 30.9 (L)  32.0 - 36.0 g/dL Final    RDW 03/28/2023 13.2  11.5 - 14.5 % Final    Platelet Count 03/28/2023 318  150 - 400 K/uL Final    MPV 03/28/2023 12.3  9.4 - 12.4 fL Final    Neutrophils %  03/28/2023 45.8 (L)  53.0 - 65.0 % Final    Lymphocytes % 03/28/2023 40.9  27.0 - 41.0 % Final    Monocytes % 03/28/2023 9.2 (H)  2.0 - 6.0 % Final    Eosinophils % 03/28/2023 3.2  1.0 - 4.0 % Final    Basophils % 03/28/2023 0.7  0.0 - 1.0 % Final    Immature Granulocytes % 03/28/2023 0.2  0.0 - 0.4 % Final    nRBC, Auto 03/28/2023 0.0  <=0.0 % Final    Neutrophils, Abs 03/28/2023 1.99  1.80 - 7.70 K/uL Final    Lymphocytes, Absolute 03/28/2023 1.78  1.00 - 4.80 K/uL Final    Monocytes, Absolute 03/28/2023 0.40  0.00 - 0.80 K/uL Final    Eosinophils, Absolute 03/28/2023 0.14  0.00 - 0.50 K/uL Final    Basophils, Absolute 03/28/2023 0.03  0.00 - 0.20 K/uL Final    Immature Granulocytes, Absolute 03/28/2023 0.01  0.00 - 0.04 K/uL Final    nRBC, Absolute 03/28/2023 0.00  <=0.00 x10e3/uL Final    Diff Type 03/28/2023 Auto   Final   Office Visit on 03/01/2023   Component Date Value Ref Range Status    Case Report 03/01/2023    Final                    Value:Pap Cytology                                      Case: [id number]                                   Authorizing Provider:  Carlos Alberto George MD            Collected:           03/01/2023 03:09 PM          Ordering Location:     [facility] Received:            03/02/2023 09:16 AM                                 - Obstetrics And                                                                                    Gynecology                                                                   First Screen:          JESUS Cazares(ASCP)                                                        Specimen:    Liquid-Based Pap Test, Screening, Vagina                                                   Interpretation 03/01/2023 Negative              Final    General Categorization 03/01/2023 Negative for intraepithelial lesion or malignancy   Final    Specimen Adequacy 03/01/2023 Satisfactory for evaluation   Final    Clinical Information 03/01/2023    Final                     Value:This result contains rich text formatting which cannot be displayed here.    Disclaimer 03/01/2023    Final                    Value:This result contains rich text formatting which cannot be displayed here.         Orders Placed This Encounter   Procedures    MRI Lumbar Spine Without Contrast     Standing Status:   Future     Standing Expiration Date:   6/26/2024     Order Specific Question:   Does the patient have a pacemaker or a defibrillator (Note: Some facilities may not be able to schedule an MRI for patients with pacemakers and defibrillators. You should contact your local radiology department to determine if this is the case.)?     Answer:   No     Order Specific Question:   Does the patient have an aneurysm or surgical clip, pump, nerve/brain stimulator, middle/inner ear prosthesis, or other metal implant or foreign object (bullet, shrapnel)? If they have a card related to their implant, ask them to bring it. Issues related to the implant may cause the MRI to be delayed.     Answer:   No     Order Specific Question:   Is the patient claustrophobic?     Answer:   No     Order Specific Question:   Will the patient require sedation?     Answer:   No     Order Specific Question:   Does the patient have any of the following conditions? Diabetes, History of Renal Disease or Hypertension requiring medical therapy?     Answer:   No     Order Specific Question:   May the Radiologist modify the order per protocol to meet the clinical needs of the patient?     Answer:   Yes     Order Specific Question:   Is this part of a Research Study?     Answer:   No     Order Specific Question:   Recist criteria?     Answer:   No     Order Specific Question:   Will this service be billed to a Worker's Comp policy?     Answer:   No     Order Specific Question:   Does the patient have on a skin patch for medication with aluminized backing?     Answer:   No     Order Specific Question:   Is the patient pregnant?     Answer:    No       Requested Prescriptions     Signed Prescriptions Disp Refills    pregabalin (LYRICA) 50 MG capsule 60 capsule 0     Sig: Take 1 capsule (50 mg total) by mouth 2 (two) times daily.       Assessment:     1. Lumbosacral radiculopathy    2. Osteoarthrosis multiple sites, not specified as generalized    3. Lumbar radiculopathy, chronic         A's of Opioid Risk Assessment  Activity:Patient can perform ADL.   Analgesia:Patients pain is partially controlled by current medication. Patient has tried OTC medications such as Tylenol and Ibuprofen with out relief.   Adverse Effects: Patient denies constipation or sedation.  Aberrant Behavior:  reviewed with no aberrant drug seeking/taking behavior.  Overdose reversal drug naloxone discussed    Drug screen reviewed      Plan:    Left lower extremity discomfort radicular in nature ongoing for more than 3 months pain numbness and tingling radiating into the left foot    Could not tolerate gabapentin would like to discuss options    Also complaint of right arm pain numbness and no also complaint left arm pain numbness and tingling radicular in nature worse with increased activity and use better with short periods resting      X-ray sacroiliac joint [facility] May 15, 2023 degenerative changes no fracture noted    X-ray lumbar spine [facility] May 15, 2023 degenerative changes multiple level    After discussing options patient would like to proceed with    Trial Lyrica 50 mg 1 p.o. q.12 hours    Toradol 60 mg IM, tolerated well     Continue home exercise program as directed    Order MRI lumbar spine    MRI for consideration procedure/surgery    I have given the patient medically directed home exercise program    Patient has tried NSAIDs and neuromodulators (neurontin, lyrica, elavil, or cymbalta)    Consider MRI cervical spine if cervical radiculopathy type symptoms continue    Follow-up 1 month     Dr. Schulz, May 2024    Bring original prescription medication  bottles/container/box with labels to each visit

## 2023-06-26 ENCOUNTER — OFFICE VISIT (OUTPATIENT)
Dept: PAIN MEDICINE | Facility: CLINIC | Age: 53
End: 2023-06-26
Payer: COMMERCIAL

## 2023-06-26 VITALS
BODY MASS INDEX: 39 KG/M2 | HEART RATE: 79 BPM | HEIGHT: 66 IN | DIASTOLIC BLOOD PRESSURE: 74 MMHG | WEIGHT: 242.63 LBS | RESPIRATION RATE: 18 BRPM | SYSTOLIC BLOOD PRESSURE: 116 MMHG

## 2023-06-26 DIAGNOSIS — M54.16 LUMBAR RADICULOPATHY, CHRONIC: ICD-10-CM

## 2023-06-26 DIAGNOSIS — M54.17 LUMBOSACRAL RADICULOPATHY: Primary | Chronic | ICD-10-CM

## 2023-06-26 DIAGNOSIS — M89.49 OSTEOARTHROSIS MULTIPLE SITES, NOT SPECIFIED AS GENERALIZED: Chronic | ICD-10-CM

## 2023-06-26 PROCEDURE — 1159F MED LIST DOCD IN RCRD: CPT | Mod: ,,, | Performed by: PHYSICIAN ASSISTANT

## 2023-06-26 PROCEDURE — 3066F PR DOCUMENTATION OF TREATMENT FOR NEPHROPATHY: ICD-10-PCS | Mod: ,,, | Performed by: PHYSICIAN ASSISTANT

## 2023-06-26 PROCEDURE — 3066F NEPHROPATHY DOC TX: CPT | Mod: ,,, | Performed by: PHYSICIAN ASSISTANT

## 2023-06-26 PROCEDURE — 3074F SYST BP LT 130 MM HG: CPT | Mod: ,,, | Performed by: PHYSICIAN ASSISTANT

## 2023-06-26 PROCEDURE — 3008F BODY MASS INDEX DOCD: CPT | Mod: ,,, | Performed by: PHYSICIAN ASSISTANT

## 2023-06-26 PROCEDURE — 3044F HG A1C LEVEL LT 7.0%: CPT | Mod: ,,, | Performed by: PHYSICIAN ASSISTANT

## 2023-06-26 PROCEDURE — 96372 THER/PROPH/DIAG INJ SC/IM: CPT | Mod: PBBFAC | Performed by: PHYSICIAN ASSISTANT

## 2023-06-26 PROCEDURE — 99214 OFFICE O/P EST MOD 30 MIN: CPT | Mod: PBBFAC | Performed by: PHYSICIAN ASSISTANT

## 2023-06-26 PROCEDURE — 99214 OFFICE O/P EST MOD 30 MIN: CPT | Mod: S$PBB,25,, | Performed by: PHYSICIAN ASSISTANT

## 2023-06-26 PROCEDURE — 3061F NEG MICROALBUMINURIA REV: CPT | Mod: ,,, | Performed by: PHYSICIAN ASSISTANT

## 2023-06-26 PROCEDURE — 3008F PR BODY MASS INDEX (BMI) DOCUMENTED: ICD-10-PCS | Mod: ,,, | Performed by: PHYSICIAN ASSISTANT

## 2023-06-26 PROCEDURE — 3074F PR MOST RECENT SYSTOLIC BLOOD PRESSURE < 130 MM HG: ICD-10-PCS | Mod: ,,, | Performed by: PHYSICIAN ASSISTANT

## 2023-06-26 PROCEDURE — 3078F DIAST BP <80 MM HG: CPT | Mod: ,,, | Performed by: PHYSICIAN ASSISTANT

## 2023-06-26 PROCEDURE — 3061F PR NEG MICROALBUMINURIA RESULT DOCUMENTED/REVIEW: ICD-10-PCS | Mod: ,,, | Performed by: PHYSICIAN ASSISTANT

## 2023-06-26 PROCEDURE — 3078F PR MOST RECENT DIASTOLIC BLOOD PRESSURE < 80 MM HG: ICD-10-PCS | Mod: ,,, | Performed by: PHYSICIAN ASSISTANT

## 2023-06-26 PROCEDURE — 3044F PR MOST RECENT HEMOGLOBIN A1C LEVEL <7.0%: ICD-10-PCS | Mod: ,,, | Performed by: PHYSICIAN ASSISTANT

## 2023-06-26 PROCEDURE — 1159F PR MEDICATION LIST DOCUMENTED IN MEDICAL RECORD: ICD-10-PCS | Mod: ,,, | Performed by: PHYSICIAN ASSISTANT

## 2023-06-26 PROCEDURE — 99214 PR OFFICE/OUTPT VISIT, EST, LEVL IV, 30-39 MIN: ICD-10-PCS | Mod: S$PBB,25,, | Performed by: PHYSICIAN ASSISTANT

## 2023-06-26 RX ORDER — PREGABALIN 50 MG/1
50 CAPSULE ORAL 2 TIMES DAILY
Qty: 60 CAPSULE | Refills: 0 | Status: SHIPPED | OUTPATIENT
Start: 2023-06-26 | End: 2023-07-13 | Stop reason: SDUPTHER

## 2023-06-26 RX ORDER — KETOROLAC TROMETHAMINE 30 MG/ML
60 INJECTION, SOLUTION INTRAMUSCULAR; INTRAVENOUS
Status: COMPLETED | OUTPATIENT
Start: 2023-06-26 | End: 2023-06-26

## 2023-06-26 RX ADMIN — KETOROLAC TROMETHAMINE 60 MG: 60 INJECTION, SOLUTION INTRAMUSCULAR at 01:06

## 2023-07-07 ENCOUNTER — HOSPITAL ENCOUNTER (OUTPATIENT)
Dept: RADIOLOGY | Facility: HOSPITAL | Age: 53
Discharge: HOME | End: 2023-07-07
Attending: PHYSICIAN ASSISTANT
Payer: COMMERCIAL

## 2023-07-07 DIAGNOSIS — M54.17 LUMBOSACRAL RADICULOPATHY: ICD-10-CM

## 2023-07-07 DIAGNOSIS — M89.49 OSTEOARTHROSIS MULTIPLE SITES, NOT SPECIFIED AS GENERALIZED: ICD-10-CM

## 2023-07-07 PROCEDURE — 72148 MRI LUMBAR SPINE W/O DYE: CPT | Mod: TC

## 2023-07-10 ENCOUNTER — OFFICE VISIT (OUTPATIENT)
Dept: FAMILY MEDICINE | Facility: CLINIC | Age: 53
End: 2023-07-10
Payer: COMMERCIAL

## 2023-07-10 VITALS
HEART RATE: 84 BPM | TEMPERATURE: 98 F | BODY MASS INDEX: 39.48 KG/M2 | DIASTOLIC BLOOD PRESSURE: 72 MMHG | OXYGEN SATURATION: 99 % | HEIGHT: 66 IN | WEIGHT: 245.63 LBS | SYSTOLIC BLOOD PRESSURE: 113 MMHG | RESPIRATION RATE: 19 BRPM

## 2023-07-10 DIAGNOSIS — M19.90 ARTHRITIS: Chronic | ICD-10-CM

## 2023-07-10 DIAGNOSIS — E66.01 CLASS 2 SEVERE OBESITY DUE TO EXCESS CALORIES WITH SERIOUS COMORBIDITY AND BODY MASS INDEX (BMI) OF 39.0 TO 39.9 IN ADULT: Primary | ICD-10-CM

## 2023-07-10 PROCEDURE — 3008F PR BODY MASS INDEX (BMI) DOCUMENTED: ICD-10-PCS | Mod: ,,, | Performed by: FAMILY MEDICINE

## 2023-07-10 PROCEDURE — 3066F NEPHROPATHY DOC TX: CPT | Mod: ,,, | Performed by: FAMILY MEDICINE

## 2023-07-10 PROCEDURE — 1160F RVW MEDS BY RX/DR IN RCRD: CPT | Mod: ,,, | Performed by: FAMILY MEDICINE

## 2023-07-10 PROCEDURE — 99213 PR OFFICE/OUTPT VISIT, EST, LEVL III, 20-29 MIN: ICD-10-PCS | Mod: ,,, | Performed by: FAMILY MEDICINE

## 2023-07-10 PROCEDURE — 3061F NEG MICROALBUMINURIA REV: CPT | Mod: ,,, | Performed by: FAMILY MEDICINE

## 2023-07-10 PROCEDURE — 3044F HG A1C LEVEL LT 7.0%: CPT | Mod: ,,, | Performed by: FAMILY MEDICINE

## 2023-07-10 PROCEDURE — 3008F BODY MASS INDEX DOCD: CPT | Mod: ,,, | Performed by: FAMILY MEDICINE

## 2023-07-10 PROCEDURE — 3066F PR DOCUMENTATION OF TREATMENT FOR NEPHROPATHY: ICD-10-PCS | Mod: ,,, | Performed by: FAMILY MEDICINE

## 2023-07-10 PROCEDURE — 1159F MED LIST DOCD IN RCRD: CPT | Mod: ,,, | Performed by: FAMILY MEDICINE

## 2023-07-10 PROCEDURE — 1159F PR MEDICATION LIST DOCUMENTED IN MEDICAL RECORD: ICD-10-PCS | Mod: ,,, | Performed by: FAMILY MEDICINE

## 2023-07-10 PROCEDURE — 99213 OFFICE O/P EST LOW 20 MIN: CPT | Mod: ,,, | Performed by: FAMILY MEDICINE

## 2023-07-10 PROCEDURE — 3044F PR MOST RECENT HEMOGLOBIN A1C LEVEL <7.0%: ICD-10-PCS | Mod: ,,, | Performed by: FAMILY MEDICINE

## 2023-07-10 PROCEDURE — 3078F PR MOST RECENT DIASTOLIC BLOOD PRESSURE < 80 MM HG: ICD-10-PCS | Mod: ,,, | Performed by: FAMILY MEDICINE

## 2023-07-10 PROCEDURE — 3074F SYST BP LT 130 MM HG: CPT | Mod: ,,, | Performed by: FAMILY MEDICINE

## 2023-07-10 PROCEDURE — 1160F PR REVIEW ALL MEDS BY PRESCRIBER/CLIN PHARMACIST DOCUMENTED: ICD-10-PCS | Mod: ,,, | Performed by: FAMILY MEDICINE

## 2023-07-10 PROCEDURE — 3074F PR MOST RECENT SYSTOLIC BLOOD PRESSURE < 130 MM HG: ICD-10-PCS | Mod: ,,, | Performed by: FAMILY MEDICINE

## 2023-07-10 PROCEDURE — 3078F DIAST BP <80 MM HG: CPT | Mod: ,,, | Performed by: FAMILY MEDICINE

## 2023-07-10 PROCEDURE — 3061F PR NEG MICROALBUMINURIA RESULT DOCUMENTED/REVIEW: ICD-10-PCS | Mod: ,,, | Performed by: FAMILY MEDICINE

## 2023-07-10 RX ORDER — PHENTERMINE HYDROCHLORIDE 37.5 MG/1
37.5 TABLET ORAL
Qty: 30 TABLET | Refills: 0 | Status: SHIPPED | OUTPATIENT
Start: 2023-07-10 | End: 2023-08-09

## 2023-07-10 RX ORDER — NAPROXEN 500 MG/1
500 TABLET ORAL 2 TIMES DAILY PRN
Qty: 45 TABLET | Refills: 1 | Status: ON HOLD | OUTPATIENT
Start: 2023-07-10 | End: 2023-09-28

## 2023-07-10 RX ORDER — POLYETHYLENE GLYCOL-3350 AND ELECTROLYTES 236; 6.74; 5.86; 2.97; 22.74 G/274.31G; G/274.31G; G/274.31G; G/274.31G; G/274.31G
4000 POWDER, FOR SOLUTION ORAL ONCE
Status: ON HOLD | COMMUNITY
Start: 2023-05-11 | End: 2023-09-28

## 2023-07-10 NOTE — PROGRESS NOTES
Clinic Note    Patient Name: Marcell Dickson  : 1970  MRN: [id number]    Chief Complaint   Patient presents with    Weight Management       HPI:    Ms. Marcell Dickson is a 52 y.o. female who presents to clinic today with CC of follow up on chronic disease processes including obesity/weight management. Patient reports she is not taking wegovy because it has been out of stock. Reports she has taken adipex before but not recently. Reports she would like to continue this for short term use. Reports she did feel like it helped when she took it previously without any problems or side effects.   She does have some issues with chronic back pain and joint pain. Reports she has had an MRI and has follow up appts scheduled as well regarding this pain  Patient reports chronic issues are, otherwise, well controlled on current medication regimen.  Denies problems or side effects with medications/MRI results.   Patient is, otherwise, without complaints.     Medications:  Medication List with Changes/Refills   New Medications    PHENTERMINE (ADIPEX-P) 37.5 MG TABLET    Take 1 tablet (37.5 mg total) by mouth before breakfast.   Current Medications    CETIRIZINE (ZYRTEC) 10 MG TABLET    Take 1 tablet (10 mg total) by mouth once daily.    CHLORPHENIRAMINE-PHENYLEPH-DM (ED A-HIST DM) 4-10-10 MG TAB    Take 1 tablet by mouth every 8 (eight) hours as needed (congestion/cough/sinus pressure).    ERGOCALCIFEROL (ERGOCALCIFEROL) 50,000 UNIT CAP    Take 1 capsule (50,000 Units total) by mouth every 7 days.    FLUCONAZOLE (DIFLUCAN) 150 MG TAB    Take one tablet by mouth every 72 hours X 3 doses if needed for yeast infection after completion of antibiotics.    FUROSEMIDE (LASIX) 20 MG TABLET    Take 1 tablet (20 mg total) by mouth daily as needed (swelling).    GABAPENTIN (NEURONTIN) 300 MG CAPSULE    Take 1 capsule (300 mg total) by mouth every evening.    GAVILYTE-G 236-22.74-6.74 -5.86 GRAM SUSPENSION    Take  4,000 mLs by mouth once.    HYDROCHLOROTHIAZIDE (HYDRODIURIL) 25 MG TABLET    Take 1 tablet (25 mg total) by mouth once daily.    HYDROXYZINE PAMOATE (VISTARIL) 25 MG CAP    Take 1 capsule (25 mg total) by mouth every 8 (eight) hours as needed (itching or anxiety).    LINACLOTIDE (LINZESS) 290 MCG CAP CAPSULE    Take 1 capsule (290 mcg total) by mouth before breakfast.    METHOCARBAMOL (ROBAXIN) 500 MG TAB    Take 1 tablet (500 mg total) by mouth 3 (three) times daily as needed (muscle spasm/pain. May cause drowsiness.).    PANTOPRAZOLE (PROTONIX) 40 MG TABLET    Take 1 tablet (40 mg total) by mouth once daily.    PREGABALIN (LYRICA) 50 MG CAPSULE    Take 1 capsule (50 mg total) by mouth 2 (two) times daily.    TRAZODONE (DESYREL) 100 MG TABLET    Take 1 tablet (100 mg total) by mouth every evening.   Changed and/or Refilled Medications    Modified Medication Previous Medication    NAPROXEN (NAPROSYN) 500 MG TABLET naproxen (NAPROSYN) 500 MG tablet       Take 1 tablet (500 mg total) by mouth 2 (two) times daily as needed (joint pain. Take with food.).    Take 1 tablet (500 mg total) by mouth 2 (two) times daily as needed (joint pain. Take with food.).   Discontinued Medications    SEMAGLUTIDE, WEIGHT LOSS, (WEGOVY) 0.5 MG/0.5 ML PNIJ    Inject 0.5 mg into the skin every 7 days.        Allergies: Sulfa (sulfonamide antibiotics)      Past Medical History:    Past Medical History:   Diagnosis Date    Arthritis     GERD (gastroesophageal reflux disease)     History of rectal polyps 08/07/2018       Past Surgical History:    Past Surgical History:   Procedure Laterality Date    breast reduction      CHOLECYSTECTOMY  10/28/2013    Dr. Pagie    COLONOSCOPY  2018    gastric sleeve      HYSTERECTOMY  10/28/2013    Robotic, BSO,Cystoscopy, TOT-    OOPHORECTOMY      TUBAL LIGATION      tummy tuck           Social History:    Social History     Tobacco Use   Smoking Status Never    Passive exposure: Never   Smokeless  "Tobacco Never     Social History     Substance and Sexual Activity   Alcohol Use Yes    Comment: occasionally     Social History     Substance and Sexual Activity   Drug Use Never         Family History:    Family History   Problem Relation Age of Onset    Colon cancer Sister        Review of Systems:    Review of Systems   Constitutional:  Negative for appetite change, chills, fatigue, fever and unexpected weight change.   Eyes:  Negative for visual disturbance.   Respiratory:  Negative for cough and shortness of breath.    Cardiovascular:  Negative for chest pain and leg swelling.   Gastrointestinal:  Negative for abdominal pain, change in bowel habit, constipation, diarrhea, nausea, vomiting and change in bowel habit.   Musculoskeletal:  Positive for arthralgias and back pain.   Integumentary:  Negative for rash.   Neurological:  Negative for dizziness and headaches.   Psychiatric/Behavioral:  The patient is not nervous/anxious.       Vitals:    Vitals:    07/10/23 1411   BP: 113/72   BP Location: Left arm   Patient Position: Sitting   BP Method: Large (Automatic)   Pulse: 84   Resp: 19   Temp: 98.1 °F (36.7 °C)   TempSrc: Oral   SpO2: 99%   Weight: 111.4 kg (245 lb 9.6 oz)   Height: 5' 6" (1.676 m)       Body mass index is 39.64 kg/m².    Wt Readings from Last 3 Encounters:   07/10/23 1411 111.4 kg (245 lb 9.6 oz)   06/26/23 1240 110 kg (242 lb 9.6 oz)   06/14/23 1004 110.7 kg (244 lb)        Physical Exam:    Physical Exam  Constitutional:       General: She is not in acute distress.     Appearance: Normal appearance. She is obese.   HENT:      Nose: Nose normal.      Mouth/Throat:      Mouth: Mucous membranes are moist.      Pharynx: Oropharynx is clear.   Eyes:      Conjunctiva/sclera: Conjunctivae normal.   Cardiovascular:      Rate and Rhythm: Normal rate and regular rhythm.      Heart sounds: Normal heart sounds. No murmur heard.  Pulmonary:      Effort: Pulmonary effort is normal. No respiratory distress. "      Breath sounds: Normal breath sounds. No wheezing, rhonchi or rales.   Abdominal:      General: Bowel sounds are normal.      Palpations: Abdomen is soft.      Tenderness: There is no abdominal tenderness.   Musculoskeletal:      Cervical back: Neck supple.      Right lower leg: No edema.      Left lower leg: No edema.   Skin:     Findings: No rash.   Neurological:      General: No focal deficit present.      Mental Status: She is alert. Mental status is at baseline.   Psychiatric:         Mood and Affect: Mood normal.         Assessment/Plan:   1. Class 2 severe obesity due to excess calories with serious comorbidity and body mass index (BMI) of 39.0 to 39.9 in adult  -     phentermine (ADIPEX-P) 37.5 mg tablet; Take 1 tablet (37.5 mg total) by mouth before breakfast.  Dispense: 30 tablet; Refill: 0    2. Arthritis  -     naproxen (NAPROSYN) 500 MG tablet; Take 1 tablet (500 mg total) by mouth 2 (two) times daily as needed (joint pain. Take with food.).  Dispense: 45 tablet; Refill: 1         Active Problem List with Overview Notes    Diagnosis Date Noted    Primary hypertension 06/28/2022    Osteoarthrosis multiple sites, not specified as generalized 06/22/2023    Other insomnia 11/03/2022    Class 2 obesity due to excess calories without serious comorbidity with body mass index (BMI) of 36.0 to 36.9 in adult 11/03/2022    Arthritis 06/28/2022    Cervical radiculopathy 07/13/2023    Lumbosacral radiculopathy 06/22/2023    Bulging lumbar disc 05/03/2023    Gastroesophageal reflux disease without esophagitis 11/03/2022    Chronic constipation 06/28/2022    Hiatal hernia 05/03/2023    Seasonal allergic rhinitis 06/28/2022        Health Maintenance:  Health Maintenance   Topic Date Due    TETANUS VACCINE  Never done    Mammogram  03/03/2024    Lipid Panel  03/28/2028    Hepatitis C Screening  Completed       RTC in 1 month for follow up on obesity/weight management.  RTC sooner if needed.   Patient voiced  understanding and is agreeable to plan.      Aileen Rodriguez MD    [facility] Medicine

## 2023-07-13 PROBLEM — M54.12 CERVICAL RADICULOPATHY: Chronic | Status: ACTIVE | Noted: 2023-07-13

## 2023-07-13 NOTE — PROGRESS NOTES
Subjective:         Patient ID: Marcell Dickson is a 52 y.o. female.    Chief Complaint: Hip Pain      Pain  This is a chronic problem. The current episode started more than 1 year ago. The problem occurs daily. The problem has been unchanged. Associated symptoms include arthralgias. Pertinent negatives include no anorexia, change in bowel habit, chest pain, chills, coughing, diaphoresis, fever, neck pain, sore throat, swollen glands, urinary symptoms, vertigo, visual change or vomiting.   Review of Systems   Constitutional:  Negative for activity change, appetite change, chills, diaphoresis, fever and unexpected weight change.   HENT:  Negative for drooling, ear discharge, ear pain, facial swelling, nosebleeds, sore throat, trouble swallowing, voice change and goiter.    Eyes:  Negative for photophobia, pain, discharge, redness and visual disturbance.   Respiratory:  Negative for apnea, cough, choking, chest tightness, shortness of breath, wheezing and stridor.    Cardiovascular:  Negative for chest pain, palpitations and leg swelling.   Gastrointestinal:  Negative for abdominal distention, anorexia, change in bowel habit, diarrhea, rectal pain, vomiting, fecal incontinence and change in bowel habit.   Endocrine: Negative for cold intolerance, heat intolerance, polydipsia, polyphagia and polyuria.   Genitourinary:  Negative for bladder incontinence, dysuria, flank pain, frequency and hot flashes.   Musculoskeletal:  Positive for arthralgias, back pain and leg pain. Negative for neck pain.   Integumentary:  Negative for color change and pallor.   Allergic/Immunologic: Negative for immunocompromised state.   Neurological:  Negative for dizziness, vertigo, seizures, syncope, facial asymmetry, speech difficulty, light-headedness, coordination difficulties, memory loss and coordination difficulties.   Hematological:  Negative for adenopathy. Does not bruise/bleed easily.   Psychiatric/Behavioral:  Negative  "for agitation, behavioral problems, confusion, decreased concentration, dysphoric mood, hallucinations, self-injury and suicidal ideas. The patient is not nervous/anxious and is not hyperactive.          Past Medical History:   Diagnosis Date    Arthritis     GERD (gastroesophageal reflux disease)     History of rectal polyps 08/07/2018     Past Surgical History:   Procedure Laterality Date    breast reduction      CHOLECYSTECTOMY  10/28/2013    Dr. Paige    COLONOSCOPY  2018    gastric sleeve      HYSTERECTOMY  10/28/2013    Robotic, BSO,Cystoscopy, TOT-    OOPHORECTOMY      TUBAL LIGATION      tummy tuck       Social History     Socioeconomic History    Marital status:    Tobacco Use    Smoking status: Never     Passive exposure: Never    Smokeless tobacco: Never   Substance and Sexual Activity    Alcohol use: Yes     Comment: occasionally    Drug use: Never    Sexual activity: Yes     Family History   Problem Relation Age of Onset    Colon cancer Sister      Review of patient's allergies indicates:   Allergen Reactions    Sulfa (sulfonamide antibiotics)         Objective:  Vitals:    07/17/23 1409   BP: 133/77   Pulse: 87   Resp: 18   Weight: 110.7 kg (244 lb)   Height: 5' 7" (1.702 m)   PainSc: 0-No pain         Physical Exam  Vitals and nursing note reviewed. Exam conducted with a chaperone present.   Constitutional:       General: She is awake. She is not in acute distress.     Appearance: Normal appearance. She is not ill-appearing, toxic-appearing or diaphoretic.   HENT:      Head: Normocephalic and atraumatic.      Nose: Nose normal.      Mouth/Throat:      Mouth: Mucous membranes are moist.      Pharynx: Oropharynx is clear.   Eyes:      Conjunctiva/sclera: Conjunctivae normal.      Pupils: Pupils are equal, round, and reactive to light.   Cardiovascular:      Rate and Rhythm: Normal rate.   Pulmonary:      Effort: Pulmonary effort is normal. No respiratory distress.   Abdominal:      " Palpations: Abdomen is soft.      Tenderness: There is no guarding.   Musculoskeletal:         General: Normal range of motion.      Cervical back: Normal range of motion and neck supple. No rigidity.   Skin:     General: Skin is warm and dry.      Coloration: Skin is not jaundiced or pale.   Neurological:      General: No focal deficit present.      Mental Status: She is alert and oriented to person, place, and time. Mental status is at baseline.      Cranial Nerves: No cranial nerve deficit (II-XII).   Psychiatric:         Mood and Affect: Mood normal.         Behavior: Behavior normal. Behavior is cooperative.         Thought Content: Thought content normal.         MRI Lumbar Spine Without Contrast  Narrative: EXAMINATION:  MRI LUMBAR SPINE WITHOUT CONTRAST    CLINICAL HISTORY:  Lumbar radiculopathy, symptoms persist with conservative treatment;.  Radiculopathy, lumbosacral region    COMPARISON:  No previous lumbar MRI available    TECHNIQUE:  The lumbar spine was imaged in the sagittal and axial planes on a 1.5 Sydney magnet without IV contrast.  Sequences include T1, T2, and STIR images.    FINDINGS:  Conus medullaris terminates at the L1-L2 level and is with gross mass lesion.    Lumbar vertebrae are well aligned.  There is no compression fracture.  There is mild type 2 Modic endplate change at L4-L5.    There is moderate degenerative disc narrowing at L3-L4 with mild narrowing at L4-L5.  There is mild to moderate disc desiccation at L3-L4 through L5-S1.  There is mild scattered anterior spondylosis.    T12-L1: No significant spinal or neural foraminal stenosis    L1-L2: No significant spinal or neural foraminal stenosis    L2-L3: No significant spinal or neural foraminal stenosis    L3-L4: There is mild narrowing of spinal canal secondary to mild posterior diffuse disc bulging as well as mild to moderate ligamentum flavum and facet hypertrophy.  There is mild neural foraminal narrowing secondary to  posterolateral bulging disc as well as facet arthropathy.    L4-L5: Slight posterior diffuse disc bulging without significant spinal stenosis.  Moderate ligamentum flavum and facet hypertrophy.  There is moderate narrowing of the inferior recess of either neural foramen secondary to posterolateral bulging disc and facet arthropathy.    L5-S1: There is slight posterior diffuse disc bulging without significant spinal stenosis.  There is moderate left and mild right neural foraminal narrowing secondary to posterolateral bulging disc and facet arthropathy.  Impression: Multilevel mild posterior diffuse disc bulging as detailed above.  No focal disc protrusion.  No high-grade spinal stenosis    Degenerative disc disease    Electronically signed by: Natanael Monzon  Date:    07/07/2023  Time:    15:09       Office Visit on 05/15/2023   Component Date Value Ref Range Status    POC Amphetamines 05/15/2023 Negative  Negative, Inconclusive Final    POC Barbiturates 05/15/2023 Negative  Negative, Inconclusive Final    POC Benzodiazepines 05/15/2023 Negative  Negative, Inconclusive Final    POC Cocaine 05/15/2023 Negative  Negative, Inconclusive Final    POC THC 05/15/2023 Negative  Negative, Inconclusive Final    POC Methadone 05/15/2023 Negative  Negative, Inconclusive Final    POC Methamphetamine 05/15/2023 Negative  Negative, Inconclusive Final    POC Opiates 05/15/2023 Negative  Negative, Inconclusive Final    POC Oxycodone 05/15/2023 Negative  Negative, Inconclusive Final    POC Phencyclidine 05/15/2023 Negative  Negative, Inconclusive Final    POC Methylenedioxymethamphetamine * 05/15/2023 Negative  Negative, Inconclusive Final    POC Tricyclic Antidepressants 05/15/2023 Negative  Negative, Inconclusive Final    POC Buprenorphine 05/15/2023 Negative   Final     Acceptable 05/15/2023 Yes   Final    POC Temperature (Urine) 05/15/2023 94   Final    pH, UA 05/15/2023 5.5  5.0 to 8.0 pH Units Final     Creatinine, Urine 05/15/2023 39  28 - 219 mg/dL Final    6-Acetylmorphine 05/15/2023 Negative  10 ng/mL Final    7-Aminoclonazepam 05/15/2023 Negative  Negative 25 ng/mL Final    a-Hydroxyalprazolam 05/15/2023 Negative  Negative 25 ng/mL Final    Acetyl Fentanyl 05/15/2023 Negative  2.5 ng/mL Final    Acetyl Norfentanyl Oxalate 05/15/2023 Negative  5 ng/mL Final    Benzoylecgonine 05/15/2023 Negative  100 ng/mL Final    Buprenorphine 05/15/2023 Negative  25 ng/mL Final    Codeine 05/15/2023 Negative  25 ng/mL Final    EDDP 05/15/2023 Negative  25 ng/mL Final    Fentanyl 05/15/2023 Negative  2.5 ng/mL Final    Hydrocodone 05/15/2023 Negative  25 ng/mL Final    Hydromorphone 05/15/2023 Negative  25 ng/mL Final    Lorazepam 05/15/2023 Negative  25 ng/mL Final    Morphine 05/15/2023 Negative  25 ng/mL Final    Norbuprenorphine 05/15/2023 Negative  25 ng/mL Final    Nordiazepam 05/15/2023 Negative  25 ng/mL Final    Norfentanyl Oxalate 05/15/2023 Negative  5 ng/mL Final    Norhydrocodone 05/15/2023 Negative  50 ng/mL Final    Noroxycodone HCL 05/15/2023 Negative  50 ng/mL Final    Oxazepam 05/15/2023 Negative  25 ng/mL Final    Oxymorphone 05/15/2023 Negative  25 ng/mL Final    Tapentadol 05/15/2023 Negative  25 ng/mL Final    Temazepam 05/15/2023 Negative  25 ng/mL Final    THC-COOH 05/15/2023 Negative  25 ng/mL Final    Tramadol 05/15/2023 Negative  100 ng/mL Final    Amphetamine, Urine 05/15/2023 Negative  Negative Final    Methamphetamines, Urine 05/15/2023 Negative  Negative Final    Methadone, Urine 05/15/2023 Negative  Negative 25 ng/mL Final    Oxycodone, Urine 05/15/2023 Negative  Negative 25 ng/mL Final    Specific Gravity, UA 05/15/2023 1.007  <=1.030 Final   Office Visit on 05/03/2023   Component Date Value Ref Range Status    Creatinine, Urine 05/03/2023 36  28 - 219 mg/dL Final    Microalbumin 05/03/2023 <0.5  0.0 - 2.8 mg/dL Final    Microalbumin/Creatinine Ratio 05/03/2023 <13.9  0.0 - 30.0 mg/g Final     Vitamin D 25-Hydroxy, Blood 05/03/2023 26.4  ng/mL Final    Vitamin B12 05/03/2023 478  193 - 986 pg/mL Final    TSH 05/03/2023 1.030  0.358 - 3.740 uIU/mL Final    Free T4 05/03/2023 0.76  0.76 - 1.46 ng/dL Final    Folate 05/03/2023 >20.0 (H)  3.1 - 17.5 ng/mL Final    Sodium 05/03/2023 137  136 - 145 mmol/L Final    Potassium 05/03/2023 3.8  3.5 - 5.1 mmol/L Final    Chloride 05/03/2023 107  98 - 107 mmol/L Final    CO2 05/03/2023 30  21 - 32 mmol/L Final    Anion Gap 05/03/2023 4 (L)  7 - 16 mmol/L Final    Glucose 05/03/2023 80  74 - 106 mg/dL Final    BUN 05/03/2023 14  7 - 18 mg/dL Final    Creatinine 05/03/2023 0.85  0.55 - 1.02 mg/dL Final    BUN/Creatinine Ratio 05/03/2023 16  6 - 20 Final    Calcium 05/03/2023 9.3  8.5 - 10.1 mg/dL Final    eGFR 05/03/2023 82  >=60 mL/min/1.73m2 Final   Office Visit on 03/28/2023   Component Date Value Ref Range Status    Hemoglobin A1C 03/28/2023 4.9  4.5 - 6.6 % Final    Estimated Average Glucose 03/28/2023 77  mg/dL Final    Glucose, Fasting 03/28/2023 82  74 - 106 mg/dL Final    Triglycerides 03/28/2023 71  35 - 150 mg/dL Final    Cholesterol 03/28/2023 150  0 - 200 mg/dL Final    HDL Cholesterol 03/28/2023 72 (H)  40 - 60 mg/dL Final    Cholesterol/HDL Ratio (Risk Factor) 03/28/2023 2.1   Final    Non-HDL 03/28/2023 78  mg/dL Final    LDL Calculated 03/28/2023 64  mg/dL Final    VLDL 03/28/2023 14  mg/dL Final    Sodium 03/28/2023 140  136 - 145 mmol/L Final    Potassium 03/28/2023 3.2 (L)  3.5 - 5.1 mmol/L Final    Chloride 03/28/2023 102  98 - 107 mmol/L Final    CO2 03/28/2023 32  21 - 32 mmol/L Final    Anion Gap 03/28/2023 9  7 - 16 mmol/L Final    Glucose 03/28/2023 82  74 - 106 mg/dL Final    BUN 03/28/2023 10  7 - 18 mg/dL Final    Creatinine 03/28/2023 0.90  0.55 - 1.02 mg/dL Final    BUN/Creatinine Ratio 03/28/2023 11  6 - 20 Final    Calcium 03/28/2023 9.0  8.5 - 10.1 mg/dL Final    Total Protein 03/28/2023 7.1  6.4 - 8.2 g/dL Final    Albumin 03/28/2023  3.5  3.5 - 5.0 g/dL Final    Globulin 03/28/2023 3.6  2.0 - 4.0 g/dL Final    A/G Ratio 03/28/2023 1.0   Final    Bilirubin, Total 03/28/2023 0.4  >0.0 - 1.2 mg/dL Final    Alk Phos 03/28/2023 91  41 - 108 U/L Final    ALT 03/28/2023 28  13 - 56 U/L Final    AST 03/28/2023 39 (H)  15 - 37 U/L Final    eGFR 03/28/2023 76  >=60 mL/min/1.73m² Final    ProBNP 03/28/2023 37  1 - 125 pg/mL Final    WBC 03/28/2023 4.35 (L)  4.50 - 11.00 K/uL Final    RBC 03/28/2023 3.86 (L)  4.20 - 5.40 M/uL Final    Hemoglobin 03/28/2023 11.7 (L)  12.0 - 16.0 g/dL Final    Hematocrit 03/28/2023 37.9 (L)  38.0 - 47.0 % Final    MCV 03/28/2023 98.2 (H)  80.0 - 96.0 fL Final    MCH 03/28/2023 30.3  27.0 - 31.0 pg Final    MCHC 03/28/2023 30.9 (L)  32.0 - 36.0 g/dL Final    RDW 03/28/2023 13.2  11.5 - 14.5 % Final    Platelet Count 03/28/2023 318  150 - 400 K/uL Final    MPV 03/28/2023 12.3  9.4 - 12.4 fL Final    Neutrophils % 03/28/2023 45.8 (L)  53.0 - 65.0 % Final    Lymphocytes % 03/28/2023 40.9  27.0 - 41.0 % Final    Monocytes % 03/28/2023 9.2 (H)  2.0 - 6.0 % Final    Eosinophils % 03/28/2023 3.2  1.0 - 4.0 % Final    Basophils % 03/28/2023 0.7  0.0 - 1.0 % Final    Immature Granulocytes % 03/28/2023 0.2  0.0 - 0.4 % Final    nRBC, Auto 03/28/2023 0.0  <=0.0 % Final    Neutrophils, Abs 03/28/2023 1.99  1.80 - 7.70 K/uL Final    Lymphocytes, Absolute 03/28/2023 1.78  1.00 - 4.80 K/uL Final    Monocytes, Absolute 03/28/2023 0.40  0.00 - 0.80 K/uL Final    Eosinophils, Absolute 03/28/2023 0.14  0.00 - 0.50 K/uL Final    Basophils, Absolute 03/28/2023 0.03  0.00 - 0.20 K/uL Final    Immature Granulocytes, Absolute 03/28/2023 0.01  0.00 - 0.04 K/uL Final    nRBC, Absolute 03/28/2023 0.00  <=0.00 x10e3/uL Final    Diff Type 03/28/2023 Auto   Final   Office Visit on 03/01/2023   Component Date Value Ref Range Status    Case Report 03/01/2023    Final                    Value:Pap Cytology                                      Case: [id number]                                    Authorizing Provider:  Carlos Alberto George MD            Collected:           03/01/2023 03:09 PM          Ordering Location:     [facility] Received:            03/02/2023 09:16 AM                                 - Obstetrics And                                                                                    Gynecology                                                                   First Screen:          Heather Schulz CT(ASCP)                                                        Specimen:    Liquid-Based Pap Test, Screening, Vagina                                                   Interpretation 03/01/2023 Negative              Final    General Categorization 03/01/2023 Negative for intraepithelial lesion or malignancy   Final    Specimen Adequacy 03/01/2023 Satisfactory for evaluation   Final    Clinical Information 03/01/2023    Final                    Value:This result contains rich text formatting which cannot be displayed here.    Disclaimer 03/01/2023    Final                    Value:This result contains rich text formatting which cannot be displayed here.         Orders Placed This Encounter   Procedures    CT Abdomen Without Contrast     Standing Status:   Future     Standing Expiration Date:   7/17/2024     Order Specific Question:   Oral/Rectal Contrast instructions:     Answer:   NO Oral Contrast     Order Specific Question:   Special CT ABD Protocol Request?     Answer:   Routine     Order Specific Question:   May the Radiologist modify the order per protocol to meet the clinical needs of the patient?     Answer:   Yes     Order Specific Question:   Access port per protocol?     Answer:   Yes     Order Specific Question:   Is the patient pregnant?     Answer:   No       Requested Prescriptions     Signed Prescriptions Disp Refills    pregabalin (LYRICA) 100 MG capsule 60 capsule 2     Sig: Take 1 capsule (100 mg total) by mouth 2 (two) times daily.        Assessment:     1. Cyst of kidney, acquired    2. Lumbosacral radiculopathy    3. Osteoarthrosis multiple sites, not specified as generalized    4. Cervical radiculopathy         A's of Opioid Risk Assessment  Activity:Patient can perform ADL.   Analgesia:Patients pain is partially controlled by current medication. Patient has tried OTC medications such as Tylenol and Ibuprofen with out relief.   Adverse Effects: Patient denies constipation or sedation.  Aberrant Behavior:  reviewed with no aberrant drug seeking/taking behavior.  Overdose reversal drug naloxone discussed    Drug screen reviewed      Plan:    Not using narcotics from our office    October 1, 2023 CT abdomen  Multiple simple cyst within the right kidney. The largest cyst measures up to 3.9 cm. Multiple simple cyst within the left kidney with the largest measuring up to 1.4 cm.     There are two 0.8 cm nodules located within the anterior left lower lobe, annual follow-up with CT of the chest without intravenous contrast recommended.     Subcentimeter nonobstructing stone right kidney.    Called results the patient she states she would like to proceed with CT chest without contrast        Would like to increase Lyrica     Could not tolerate gabapentin gastric upset    Left lower extremity discomfort radicular in nature ongoing for more than 3 months pain numbness and tingling radiating into the left foot    Intermittent cervical radicular-type symptoms upper extremity    Follow-up after MRI lumbar spine     MRI lumbar spine [facility] July 7, 2023 L5/S1 left-sided neuroforaminal stenosis greater than right, L4/5 neuroforaminal narrowing left greater than right disc bulge, L3/4 bilateral neural foraminal stenosis multiple level degenerative changes  Large right renal cysts    X-ray sacroiliac joint [facility] May 15, 2023 degenerative changes no fracture noted    X-ray lumbar spine [facility] May 15, 2023 degenerative changes multiple  level    After discussing options patient would like to proceed with    Increase Lyrica    Continue home exercise program as directed    CT abdomen right renal cyst     Will call results CT to patient    Follow-up as needed patient request    Dr. Schulz, May 2024    Bring original prescription medication bottles/container/box with labels to each visit

## 2023-07-17 ENCOUNTER — OFFICE VISIT (OUTPATIENT)
Dept: PAIN MEDICINE | Facility: CLINIC | Age: 53
End: 2023-07-17
Payer: COMMERCIAL

## 2023-07-17 VITALS
BODY MASS INDEX: 38.3 KG/M2 | DIASTOLIC BLOOD PRESSURE: 77 MMHG | HEART RATE: 87 BPM | WEIGHT: 244 LBS | RESPIRATION RATE: 18 BRPM | HEIGHT: 67 IN | SYSTOLIC BLOOD PRESSURE: 133 MMHG

## 2023-07-17 DIAGNOSIS — M89.49 OSTEOARTHROSIS MULTIPLE SITES, NOT SPECIFIED AS GENERALIZED: Chronic | ICD-10-CM

## 2023-07-17 DIAGNOSIS — R91.1 LUNG NODULE: ICD-10-CM

## 2023-07-17 DIAGNOSIS — M54.17 LUMBOSACRAL RADICULOPATHY: Chronic | ICD-10-CM

## 2023-07-17 DIAGNOSIS — N28.1 CYST OF KIDNEY, ACQUIRED: Primary | ICD-10-CM

## 2023-07-17 DIAGNOSIS — M54.12 CERVICAL RADICULOPATHY: Chronic | ICD-10-CM

## 2023-07-17 PROCEDURE — 3066F NEPHROPATHY DOC TX: CPT | Mod: ,,, | Performed by: PHYSICIAN ASSISTANT

## 2023-07-17 PROCEDURE — 99214 OFFICE O/P EST MOD 30 MIN: CPT | Mod: S$PBB,,, | Performed by: PHYSICIAN ASSISTANT

## 2023-07-17 PROCEDURE — 99215 OFFICE O/P EST HI 40 MIN: CPT | Mod: PBBFAC | Performed by: PHYSICIAN ASSISTANT

## 2023-07-17 PROCEDURE — 3066F PR DOCUMENTATION OF TREATMENT FOR NEPHROPATHY: ICD-10-PCS | Mod: ,,, | Performed by: PHYSICIAN ASSISTANT

## 2023-07-17 PROCEDURE — 3061F NEG MICROALBUMINURIA REV: CPT | Mod: ,,, | Performed by: PHYSICIAN ASSISTANT

## 2023-07-17 PROCEDURE — 3008F BODY MASS INDEX DOCD: CPT | Mod: ,,, | Performed by: PHYSICIAN ASSISTANT

## 2023-07-17 PROCEDURE — 3008F PR BODY MASS INDEX (BMI) DOCUMENTED: ICD-10-PCS | Mod: ,,, | Performed by: PHYSICIAN ASSISTANT

## 2023-07-17 PROCEDURE — 1159F PR MEDICATION LIST DOCUMENTED IN MEDICAL RECORD: ICD-10-PCS | Mod: ,,, | Performed by: PHYSICIAN ASSISTANT

## 2023-07-17 PROCEDURE — 3075F PR MOST RECENT SYSTOLIC BLOOD PRESS GE 130-139MM HG: ICD-10-PCS | Mod: ,,, | Performed by: PHYSICIAN ASSISTANT

## 2023-07-17 PROCEDURE — 3078F PR MOST RECENT DIASTOLIC BLOOD PRESSURE < 80 MM HG: ICD-10-PCS | Mod: ,,, | Performed by: PHYSICIAN ASSISTANT

## 2023-07-17 PROCEDURE — 3044F HG A1C LEVEL LT 7.0%: CPT | Mod: ,,, | Performed by: PHYSICIAN ASSISTANT

## 2023-07-17 PROCEDURE — 3078F DIAST BP <80 MM HG: CPT | Mod: ,,, | Performed by: PHYSICIAN ASSISTANT

## 2023-07-17 PROCEDURE — 3061F PR NEG MICROALBUMINURIA RESULT DOCUMENTED/REVIEW: ICD-10-PCS | Mod: ,,, | Performed by: PHYSICIAN ASSISTANT

## 2023-07-17 PROCEDURE — 3075F SYST BP GE 130 - 139MM HG: CPT | Mod: ,,, | Performed by: PHYSICIAN ASSISTANT

## 2023-07-17 PROCEDURE — 3044F PR MOST RECENT HEMOGLOBIN A1C LEVEL <7.0%: ICD-10-PCS | Mod: ,,, | Performed by: PHYSICIAN ASSISTANT

## 2023-07-17 PROCEDURE — 1159F MED LIST DOCD IN RCRD: CPT | Mod: ,,, | Performed by: PHYSICIAN ASSISTANT

## 2023-07-17 PROCEDURE — 99214 PR OFFICE/OUTPT VISIT, EST, LEVL IV, 30-39 MIN: ICD-10-PCS | Mod: S$PBB,,, | Performed by: PHYSICIAN ASSISTANT

## 2023-07-17 RX ORDER — PREGABALIN 100 MG/1
100 CAPSULE ORAL 2 TIMES DAILY
Qty: 60 CAPSULE | Refills: 2 | Status: SHIPPED | OUTPATIENT
Start: 2023-07-17 | End: 2023-11-30 | Stop reason: SDUPTHER

## 2023-07-24 DIAGNOSIS — G47.09 OTHER INSOMNIA: Chronic | ICD-10-CM

## 2023-07-25 RX ORDER — TRAZODONE HYDROCHLORIDE 100 MG/1
100 TABLET ORAL NIGHTLY
Qty: 90 TABLET | Refills: 1 | Status: SHIPPED | OUTPATIENT
Start: 2023-07-25 | End: 2023-10-16 | Stop reason: SDUPTHER

## 2023-08-01 ENCOUNTER — HOSPITAL ENCOUNTER (OUTPATIENT)
Dept: RADIOLOGY | Facility: HOSPITAL | Age: 53
Discharge: HOME | End: 2023-08-01
Attending: PHYSICIAN ASSISTANT
Payer: COMMERCIAL

## 2023-08-01 DIAGNOSIS — N28.1 CYST OF KIDNEY, ACQUIRED: ICD-10-CM

## 2023-08-01 PROCEDURE — 74150 CT ABDOMEN W/O CONTRAST: CPT | Mod: TC

## 2023-08-01 PROCEDURE — 74150 CT ABDOMEN WITHOUT CONTRAST: ICD-10-PCS | Mod: 26,,, | Performed by: STUDENT IN AN ORGANIZED HEALTH CARE EDUCATION/TRAINING PROGRAM

## 2023-08-01 PROCEDURE — 74150 CT ABDOMEN W/O CONTRAST: CPT | Mod: 26,,, | Performed by: STUDENT IN AN ORGANIZED HEALTH CARE EDUCATION/TRAINING PROGRAM

## 2023-08-09 ENCOUNTER — HOSPITAL ENCOUNTER (OUTPATIENT)
Dept: RADIOLOGY | Facility: HOSPITAL | Age: 53
Discharge: HOME | End: 2023-08-09
Attending: PHYSICIAN ASSISTANT
Payer: COMMERCIAL

## 2023-08-09 ENCOUNTER — PATIENT MESSAGE (OUTPATIENT)
Dept: FAMILY MEDICINE | Facility: CLINIC | Age: 53
End: 2023-08-09
Payer: COMMERCIAL

## 2023-08-09 DIAGNOSIS — R91.1 LUNG NODULE: ICD-10-CM

## 2023-08-09 PROCEDURE — 71250 CT THORAX DX C-: CPT | Mod: TC

## 2023-08-10 ENCOUNTER — TELEPHONE (OUTPATIENT)
Dept: FAMILY MEDICINE | Facility: CLINIC | Age: 53
End: 2023-08-10
Payer: COMMERCIAL

## 2023-08-10 DIAGNOSIS — N95.1 MENOPAUSAL AND FEMALE CLIMACTERIC STATES: Primary | ICD-10-CM

## 2023-08-10 RX ORDER — ESTRADIOL 1 MG/1
1 TABLET ORAL DAILY
Qty: 30 TABLET | Refills: 11 | Status: CANCELLED | OUTPATIENT
Start: 2023-08-10 | End: 2024-08-09

## 2023-08-10 NOTE — TELEPHONE ENCOUNTER
Call made to pt regarding pt requesting Dr. Wild look over recent CT test. Informed her that she will need with the provider that ordered the test and then follow up with PCP if needed. Pt voiced understanding.

## 2023-08-28 ENCOUNTER — ANESTHESIA EVENT (OUTPATIENT)
Dept: GASTROENTEROLOGY | Facility: HOSPITAL | Age: 53
End: 2023-08-28
Payer: COMMERCIAL

## 2023-08-28 ENCOUNTER — ANESTHESIA (OUTPATIENT)
Dept: GASTROENTEROLOGY | Facility: HOSPITAL | Age: 53
End: 2023-08-28
Payer: COMMERCIAL

## 2023-08-28 ENCOUNTER — HOSPITAL ENCOUNTER (OUTPATIENT)
Dept: GASTROENTEROLOGY | Facility: HOSPITAL | Age: 53
Discharge: HOME | End: 2023-08-28
Payer: COMMERCIAL

## 2023-08-28 VITALS
OXYGEN SATURATION: 100 % | RESPIRATION RATE: 15 BRPM | HEART RATE: 69 BPM | SYSTOLIC BLOOD PRESSURE: 124 MMHG | DIASTOLIC BLOOD PRESSURE: 71 MMHG | TEMPERATURE: 99 F

## 2023-08-28 DIAGNOSIS — Z12.11 COLON CANCER SCREENING: ICD-10-CM

## 2023-08-28 DIAGNOSIS — Z86.010 PERSONAL HISTORY OF COLONIC POLYPS: ICD-10-CM

## 2023-08-28 DIAGNOSIS — Z12.11 SCREENING FOR COLON CANCER: ICD-10-CM

## 2023-08-28 PROCEDURE — D9220A PRA ANESTHESIA: Mod: ,,, | Performed by: NURSE ANESTHETIST, CERTIFIED REGISTERED

## 2023-08-28 PROCEDURE — D9220A PRA ANESTHESIA: ICD-10-PCS | Mod: ,,, | Performed by: NURSE ANESTHETIST, CERTIFIED REGISTERED

## 2023-08-28 PROCEDURE — G0105 COLORECTAL SCRN; HI RISK IND: HCPCS | Mod: 74 | Performed by: INTERNAL MEDICINE

## 2023-08-28 PROCEDURE — 63600175 PHARM REV CODE 636 W HCPCS: Performed by: NURSE ANESTHETIST, CERTIFIED REGISTERED

## 2023-08-28 PROCEDURE — 37000008 HC ANESTHESIA 1ST 15 MINUTES

## 2023-08-28 PROCEDURE — 25000003 PHARM REV CODE 250: Performed by: NURSE ANESTHETIST, CERTIFIED REGISTERED

## 2023-08-28 PROCEDURE — 27000284 HC CANNULA NASAL: Performed by: NURSE ANESTHETIST, CERTIFIED REGISTERED

## 2023-08-28 PROCEDURE — G0105 COLORECTAL SCRN; HI RISK IND: HCPCS | Mod: 53,,, | Performed by: INTERNAL MEDICINE

## 2023-08-28 PROCEDURE — G0105 COLORECTAL SCRN; HI RISK IND: ICD-10-PCS | Mod: 53,,, | Performed by: INTERNAL MEDICINE

## 2023-08-28 RX ORDER — PROPOFOL 10 MG/ML
VIAL (ML) INTRAVENOUS CONTINUOUS PRN
Status: DISCONTINUED | OUTPATIENT
Start: 2023-08-28 | End: 2023-08-28

## 2023-08-28 RX ORDER — LIDOCAINE HYDROCHLORIDE 20 MG/ML
INJECTION, SOLUTION EPIDURAL; INFILTRATION; INTRACAUDAL; PERINEURAL
Status: DISCONTINUED | OUTPATIENT
Start: 2023-08-28 | End: 2023-08-28

## 2023-08-28 RX ORDER — SODIUM CHLORIDE 0.9 % (FLUSH) 0.9 %
10 SYRINGE (ML) INJECTION
Status: DISCONTINUED | OUTPATIENT
Start: 2023-08-28 | End: 2023-08-29 | Stop reason: HOSPADM

## 2023-08-28 RX ADMIN — SODIUM CHLORIDE: 9 INJECTION, SOLUTION INTRAVENOUS at 02:08

## 2023-08-28 RX ADMIN — PROPOFOL 125 MCG/KG/MIN: 10 INJECTION, EMULSION INTRAVENOUS at 02:08

## 2023-08-28 RX ADMIN — LIDOCAINE HYDROCHLORIDE 60 MG: 20 INJECTION, SOLUTION INTRAVENOUS at 02:08

## 2023-08-28 NOTE — DISCHARGE INSTRUCTIONS
Procedure Date  8/28/23     Impression  Overall Impression:   The sigmoid colon and rectum appeared normal allowing for limited views  Solid stool in the rectum and sigmoid colon - the procedure was aborted     Recommendation    Schedule repeat colonoscopy in the next 6 months with a 2-day prep (2 days of clear liquids followed by routine golytely or miralax prep)      Outcome of procedure: aborted Colonoscopy  Disposition: patient to recovery following procedure; discharge to home when appropriate parameters met  Provisions for follow up: please call my office for any unexpected symptoms like chest or abdominal pain or bleeding following your procedure.  Final Diagnosis: h/o colon polyps     NO DRIVING, OPERATING EQUIPMENT, OR SIGNING LEGAL DOCUMENTS FOR 24 HOURS.

## 2023-08-28 NOTE — ANESTHESIA PREPROCEDURE EVALUATION
08/28/2023  Marcell Dickson is a 53 y.o., female.    Past Medical History:   Diagnosis Date    Arthritis     GERD (gastroesophageal reflux disease)     History of rectal polyps 08/07/2018       Past Surgical History:   Procedure Laterality Date    breast reduction      CHOLECYSTECTOMY  10/28/2013    Dr. Paige    COLONOSCOPY  2018    gastric sleeve      HYSTERECTOMY  10/28/2013    Robotic, BSO,Cystoscopy, TOT-    OOPHORECTOMY      TUBAL LIGATION      tummy tuck         Family History   Problem Relation Age of Onset    Colon cancer Sister        Social History     Socioeconomic History    Marital status:    Tobacco Use    Smoking status: Never     Passive exposure: Never    Smokeless tobacco: Never   Substance and Sexual Activity    Alcohol use: Yes     Comment: occasionally    Drug use: Never    Sexual activity: Yes       Current Outpatient Medications   Medication Sig Dispense Refill    cetirizine (ZYRTEC) 10 MG tablet Take 1 tablet (10 mg total) by mouth once daily. 90 tablet 1    chlorpheniramine-phenyleph-DM (ED A-HIST DM) 4-10-10 mg Tab Take 1 tablet by mouth every 8 (eight) hours as needed (congestion/cough/sinus pressure). 30 tablet 0    ergocalciferol (ERGOCALCIFEROL) 50,000 unit Cap Take 1 capsule (50,000 Units total) by mouth every 7 days. 12 capsule 0    fluconazole (DIFLUCAN) 150 MG Tab Take one tablet by mouth every 72 hours X 3 doses if needed for yeast infection after completion of antibiotics. (Patient not taking: Reported on 7/10/2023) 3 tablet 0    furosemide (LASIX) 20 MG tablet Take 1 tablet (20 mg total) by mouth daily as needed (swelling). 90 tablet 1    gabapentin (NEURONTIN) 300 MG capsule Take 1 capsule (300 mg total) by mouth every evening. (Patient not taking: Reported on 7/10/2023) 90 capsule 0    GAVILYTE-G 236-22.74-6.74 -5.86 gram  suspension Take 4,000 mLs by mouth once.      hydroCHLOROthiazide (HYDRODIURIL) 25 MG tablet Take 1 tablet (25 mg total) by mouth once daily. (Patient not taking: Reported on 7/10/2023) 90 tablet 1    hydrOXYzine pamoate (VISTARIL) 25 MG Cap Take 1 capsule (25 mg total) by mouth every 8 (eight) hours as needed (itching or anxiety). 45 capsule 2    linaCLOtide (LINZESS) 290 mcg Cap capsule Take 1 capsule (290 mcg total) by mouth before breakfast. 90 capsule 1    methocarbamoL (ROBAXIN) 500 MG Tab Take 1 tablet (500 mg total) by mouth 3 (three) times daily as needed (muscle spasm/pain. May cause drowsiness.). 45 tablet 0    naproxen (NAPROSYN) 500 MG tablet Take 1 tablet (500 mg total) by mouth 2 (two) times daily as needed (joint pain. Take with food.). 45 tablet 1    pantoprazole (PROTONIX) 40 MG tablet Take 1 tablet (40 mg total) by mouth once daily. 90 tablet 1    pregabalin (LYRICA) 100 MG capsule Take 1 capsule (100 mg total) by mouth 2 (two) times daily. 60 capsule 2    traZODone (DESYREL) 100 MG tablet Take 1 tablet (100 mg total) by mouth every evening. 90 tablet 1     No current facility-administered medications for this encounter.       Review of patient's allergies indicates:   Allergen Reactions    Sulfa (sulfonamide antibiotics)      Patient Active Problem List   Diagnosis    Seasonal allergic rhinitis    Primary hypertension    Arthritis    Chronic constipation    Other insomnia    Gastroesophageal reflux disease without esophagitis    Class 2 obesity due to excess calories without serious comorbidity with body mass index (BMI) of 36.0 to 36.9 in adult    Bulging lumbar disc    Hiatal hernia    Lumbosacral radiculopathy    Osteoarthrosis multiple sites, not specified as generalized    Cervical radiculopathy       Pre-op Assessment    I have reviewed the Patient Summary Reports.     I have reviewed the Nursing Notes. I have reviewed the NPO Status.   I have reviewed the Medications.      Review of Systems  Cardiovascular:   Hypertension    Hepatic/GI:   Hiatal Hernia, GERD    Musculoskeletal:   Arthritis     Neurological:   Neuromuscular Disease,    Endocrine:  Obesity / BMI > 30      Physical Exam  General: Well nourished, Alert, Oriented and Cooperative    Airway:  Mallampati: III   Mouth Opening: Normal  Neck ROM: Normal ROM    Dental:  Intact    Chest/Lungs:  Normal Respiratory Rate    Heart:  Rate: Normal        Anesthesia Plan  Type of Anesthesia, risks & benefits discussed:    Anesthesia Type: Gen Natural Airway, MAC  Intra-op Monitoring Plan: Standard ASA Monitors  Post Op Pain Control Plan: multimodal analgesia and IV/PO Opioids PRN  Induction:  IV  Informed Consent: Informed consent signed with the Patient and all parties understand the risks and agree with anesthesia plan.  All questions answered. Patient consented to blood products? Yes  ASA Score: 3  Day of Surgery Review of History & Physical: I have interviewed and examined the patient. I have reviewed the patient's H&P dated: There are no significant changes.     Ready For Surgery From Anesthesia Perspective.     .

## 2023-08-28 NOTE — TRANSFER OF CARE
Anesthesia Transfer of Care Note    Patient: Marcell SimonMitali    Procedure(s) Performed: * Colonoscopy *    Patient location: GI    Anesthesia Type: general    Transport from OR: Transported from OR on room air with adequate spontaneous ventilation. Continuous ECG monitoring in transport. Continuous SpO2 monitoring in transport    Post pain: adequate analgesia    Post assessment: no apparent anesthetic complications    Post vital signs: stable    Level of consciousness: sedated and responds to stimulation    Nausea/Vomiting: no nausea/vomiting    Complications: none    Transfer of care protocol was followedComments: Good SV continue, NAD, VSS, RTRN      Last vitals:   Visit Vitals  /72   Pulse 76   Temp 37 °C (98.6 °F)   Resp 20   SpO2 100%   Breastfeeding No

## 2023-08-28 NOTE — H&P
Gastroenterology Pre-procedure H&P    Chief Complaint: Colon cancer screening    History of Present Illness    Marcell Dickson is a 53 y.o. female that  has a past medical history of Arthritis, GERD (gastroesophageal reflux disease), and History of rectal polyps (08/07/2018).     Patient here for routine surveillance    Patient denies wt loss, abdominal pain, diarrhea, melena/hematochezia, change in stool caliber, no anticoagulants, FMHx of GI related malignancies.      Past Medical History:   Diagnosis Date    Arthritis     GERD (gastroesophageal reflux disease)     History of rectal polyps 08/07/2018       Past Surgical History:   Procedure Laterality Date    breast reduction      CHOLECYSTECTOMY  10/28/2013    Dr. Paige    COLONOSCOPY  2018    gastric sleeve      HYSTERECTOMY  10/28/2013    Robotic, BSO,Cystoscopy, TOT-    OOPHORECTOMY      TUBAL LIGATION      tummy tuck         Family History   Problem Relation Age of Onset    Colon cancer Sister        Social History     Socioeconomic History    Marital status:    Tobacco Use    Smoking status: Never     Passive exposure: Never    Smokeless tobacco: Never   Substance and Sexual Activity    Alcohol use: Yes     Comment: occasionally    Drug use: Never    Sexual activity: Yes       Current Outpatient Medications   Medication Sig Dispense Refill    cetirizine (ZYRTEC) 10 MG tablet Take 1 tablet (10 mg total) by mouth once daily. 90 tablet 1    chlorpheniramine-phenyleph-DM (ED A-HIST DM) 4-10-10 mg Tab Take 1 tablet by mouth every 8 (eight) hours as needed (congestion/cough/sinus pressure). 30 tablet 0    ergocalciferol (ERGOCALCIFEROL) 50,000 unit Cap Take 1 capsule (50,000 Units total) by mouth every 7 days. 12 capsule 0    fluconazole (DIFLUCAN) 150 MG Tab Take one tablet by mouth every 72 hours X 3 doses if needed for yeast infection after completion of antibiotics. (Patient not taking: Reported on 7/10/2023) 3 tablet 0    furosemide  (LASIX) 20 MG tablet Take 1 tablet (20 mg total) by mouth daily as needed (swelling). 90 tablet 1    gabapentin (NEURONTIN) 300 MG capsule Take 1 capsule (300 mg total) by mouth every evening. (Patient not taking: Reported on 7/10/2023) 90 capsule 0    GAVILYTE-G 236-22.74-6.74 -5.86 gram suspension Take 4,000 mLs by mouth once.      hydroCHLOROthiazide (HYDRODIURIL) 25 MG tablet Take 1 tablet (25 mg total) by mouth once daily. (Patient not taking: Reported on 7/10/2023) 90 tablet 1    hydrOXYzine pamoate (VISTARIL) 25 MG Cap Take 1 capsule (25 mg total) by mouth every 8 (eight) hours as needed (itching or anxiety). 45 capsule 2    linaCLOtide (LINZESS) 290 mcg Cap capsule Take 1 capsule (290 mcg total) by mouth before breakfast. 90 capsule 1    methocarbamoL (ROBAXIN) 500 MG Tab Take 1 tablet (500 mg total) by mouth 3 (three) times daily as needed (muscle spasm/pain. May cause drowsiness.). 45 tablet 0    naproxen (NAPROSYN) 500 MG tablet Take 1 tablet (500 mg total) by mouth 2 (two) times daily as needed (joint pain. Take with food.). 45 tablet 1    pantoprazole (PROTONIX) 40 MG tablet Take 1 tablet (40 mg total) by mouth once daily. 90 tablet 1    pregabalin (LYRICA) 100 MG capsule Take 1 capsule (100 mg total) by mouth 2 (two) times daily. 60 capsule 2    traZODone (DESYREL) 100 MG tablet Take 1 tablet (100 mg total) by mouth every evening. 90 tablet 1     No current facility-administered medications for this encounter.       Review of patient's allergies indicates:   Allergen Reactions    Sulfa (sulfonamide antibiotics)        Objective:  There were no vitals filed for this visit.     GEN: normal appearing, NAD, AAO x3  HENT: NCAT, anicteric, OP benign  CV: normal rate, regular rhythm  RESP: NABS, symmetric rise, unlabored  ABD: soft, ND, no guarding or TTP  SKIN: warm and dry  NEURO: grossly afocal    Assessment and Plan:    Proceed with:    Colonoscopy for previous adenomatous polyp, screening for colon  cancer    Andreas Dia MD  Gastroenterology

## 2023-08-29 NOTE — ANESTHESIA POSTPROCEDURE EVALUATION
Anesthesia Post Evaluation    Patient: Marcell SimonRosaman    Procedure(s) Performed: * Colonoscopy *    Final Anesthesia Type: general      Patient location during evaluation: GI PACU  Patient participation: Yes- Able to Participate  Level of consciousness: awake and alert  Post-procedure vital signs: reviewed and stable  Pain management: adequate  Airway patency: patent    PONV status at discharge: No PONV  Anesthetic complications: no      Cardiovascular status: blood pressure returned to baseline and hemodynamically stable  Respiratory status: spontaneous ventilation  Hydration status: euvolemic  Follow-up not needed.  Comments: Refer to nursing notes for pain/juan francisco score upon discharge from recovery.          Vitals Value Taken Time   /71 08/28/23 1440   Temp 37 °C (98.6 °F) 08/28/23 1410   Pulse 69 08/28/23 1440   Resp 15 08/28/23 1440   SpO2 100 % 08/28/23 1435         Event Time   Out of Recovery 14:49:16         Pain/Juan Francisco Score: Juan Francisco Score: 10 (8/28/2023  2:34 PM)

## 2023-09-19 ENCOUNTER — OFFICE VISIT (OUTPATIENT)
Dept: FAMILY MEDICINE | Facility: CLINIC | Age: 53
End: 2023-09-19
Payer: COMMERCIAL

## 2023-09-19 VITALS
OXYGEN SATURATION: 97 % | BODY MASS INDEX: 39.84 KG/M2 | SYSTOLIC BLOOD PRESSURE: 125 MMHG | WEIGHT: 253.81 LBS | HEART RATE: 64 BPM | HEIGHT: 67 IN | DIASTOLIC BLOOD PRESSURE: 85 MMHG | TEMPERATURE: 98 F | RESPIRATION RATE: 16 BRPM

## 2023-09-19 DIAGNOSIS — F41.9 ANXIETY: ICD-10-CM

## 2023-09-19 DIAGNOSIS — L29.9 ITCHING: ICD-10-CM

## 2023-09-19 DIAGNOSIS — E66.09 CLASS 2 OBESITY DUE TO EXCESS CALORIES WITHOUT SERIOUS COMORBIDITY WITH BODY MASS INDEX (BMI) OF 36.0 TO 36.9 IN ADULT: Primary | Chronic | ICD-10-CM

## 2023-09-19 DIAGNOSIS — K21.9 GASTROESOPHAGEAL REFLUX DISEASE WITHOUT ESOPHAGITIS: Chronic | ICD-10-CM

## 2023-09-19 DIAGNOSIS — G56.01 CARPAL TUNNEL SYNDROME OF RIGHT WRIST: ICD-10-CM

## 2023-09-19 DIAGNOSIS — R60.0 LOCALIZED EDEMA: ICD-10-CM

## 2023-09-19 PROCEDURE — 3079F PR MOST RECENT DIASTOLIC BLOOD PRESSURE 80-89 MM HG: ICD-10-PCS | Mod: ,,, | Performed by: FAMILY MEDICINE

## 2023-09-19 PROCEDURE — 3074F SYST BP LT 130 MM HG: CPT | Mod: ,,, | Performed by: FAMILY MEDICINE

## 2023-09-19 PROCEDURE — 99214 PR OFFICE/OUTPT VISIT, EST, LEVL IV, 30-39 MIN: ICD-10-PCS | Mod: ,,, | Performed by: FAMILY MEDICINE

## 2023-09-19 PROCEDURE — 3061F NEG MICROALBUMINURIA REV: CPT | Mod: ,,, | Performed by: FAMILY MEDICINE

## 2023-09-19 PROCEDURE — 3066F PR DOCUMENTATION OF TREATMENT FOR NEPHROPATHY: ICD-10-PCS | Mod: ,,, | Performed by: FAMILY MEDICINE

## 2023-09-19 PROCEDURE — 3074F PR MOST RECENT SYSTOLIC BLOOD PRESSURE < 130 MM HG: ICD-10-PCS | Mod: ,,, | Performed by: FAMILY MEDICINE

## 2023-09-19 PROCEDURE — 3079F DIAST BP 80-89 MM HG: CPT | Mod: ,,, | Performed by: FAMILY MEDICINE

## 2023-09-19 PROCEDURE — 3044F HG A1C LEVEL LT 7.0%: CPT | Mod: ,,, | Performed by: FAMILY MEDICINE

## 2023-09-19 PROCEDURE — 3066F NEPHROPATHY DOC TX: CPT | Mod: ,,, | Performed by: FAMILY MEDICINE

## 2023-09-19 PROCEDURE — 3008F BODY MASS INDEX DOCD: CPT | Mod: ,,, | Performed by: FAMILY MEDICINE

## 2023-09-19 PROCEDURE — 3044F PR MOST RECENT HEMOGLOBIN A1C LEVEL <7.0%: ICD-10-PCS | Mod: ,,, | Performed by: FAMILY MEDICINE

## 2023-09-19 PROCEDURE — 3061F PR NEG MICROALBUMINURIA RESULT DOCUMENTED/REVIEW: ICD-10-PCS | Mod: ,,, | Performed by: FAMILY MEDICINE

## 2023-09-19 PROCEDURE — 3008F PR BODY MASS INDEX (BMI) DOCUMENTED: ICD-10-PCS | Mod: ,,, | Performed by: FAMILY MEDICINE

## 2023-09-19 PROCEDURE — 99214 OFFICE O/P EST MOD 30 MIN: CPT | Mod: ,,, | Performed by: FAMILY MEDICINE

## 2023-09-19 RX ORDER — HYDROXYZINE PAMOATE 25 MG/1
CAPSULE ORAL
Qty: 45 CAPSULE | Refills: 2 | Status: SHIPPED | OUTPATIENT
Start: 2023-09-19 | End: 2024-02-20 | Stop reason: SDUPTHER

## 2023-09-19 RX ORDER — SEMAGLUTIDE 0.25 MG/.5ML
INJECTION, SOLUTION SUBCUTANEOUS
COMMUNITY
Start: 2023-09-06 | End: 2023-09-19 | Stop reason: SDUPTHER

## 2023-09-19 RX ORDER — SEMAGLUTIDE 0.25 MG/.5ML
0.25 INJECTION, SOLUTION SUBCUTANEOUS WEEKLY
Qty: 2 ML | Refills: 5 | Status: ON HOLD | OUTPATIENT
Start: 2023-09-19 | End: 2023-09-28

## 2023-09-19 RX ORDER — HYDROCODONE BITARTRATE AND ACETAMINOPHEN 10; 325 MG/1; MG/1
1 TABLET ORAL EVERY 6 HOURS PRN
COMMUNITY
Start: 2023-08-21 | End: 2023-09-22

## 2023-09-19 RX ORDER — FUROSEMIDE 20 MG/1
20 TABLET ORAL DAILY
Qty: 90 TABLET | Refills: 1 | Status: SHIPPED | OUTPATIENT
Start: 2023-09-19 | End: 2024-02-20 | Stop reason: SDUPTHER

## 2023-09-19 NOTE — PROGRESS NOTES
Clinic Note    Patient Name: Marcell Dickson  : 1970  MRN: [id number]    Chief Complaint   Patient presents with    Follow-up     States she had a nerve test done on right hand    Edema     Mainly left leg       HPI:    Ms. Marcell Dickson is a 53 y.o. female who presents to clinic today with CC of need for referral to Ortho. Reports she recently had a nerve conduction study in [place]. Reports she was told that she had carpal tunnel syndrome and needed surgery. Reports pain is worse in R hand. We have not yet received a copy of nerve conduction study.  Patient reports swelling in bilateral legs.  Reports she is also concerned about obesity. Reports she has been trying diet/exercise but does not feel like she has been able to lose a significant amount of weight. Would like to try medication to help with weight loss.  Patient is, otherwise, without complaints.     Medications:  Medication List with Changes/Refills   Current Medications    CETIRIZINE (ZYRTEC) 10 MG TABLET    Take 1 tablet (10 mg total) by mouth once daily.    CHLORPHENIRAMINE-PHENYLEPH-DM (ED A-HIST DM) 4-10-10 MG TAB    Take 1 tablet by mouth every 8 (eight) hours as needed (congestion/cough/sinus pressure).    FLUCONAZOLE (DIFLUCAN) 150 MG TAB    Take one tablet by mouth every 72 hours X 3 doses if needed for yeast infection after completion of antibiotics.    GABAPENTIN (NEURONTIN) 300 MG CAPSULE    Take 1 capsule (300 mg total) by mouth every evening.    GAVILYTE-G 236-22.74-6.74 -5.86 GRAM SUSPENSION    Take 4,000 mLs by mouth once.    HYDROCHLOROTHIAZIDE (HYDRODIURIL) 25 MG TABLET    Take 1 tablet (25 mg total) by mouth once daily.    HYDROCODONE-ACETAMINOPHEN (NORCO)  MG PER TABLET    Take 1 tablet by mouth every 6 (six) hours as needed.    HYDROXYZINE PAMOATE (VISTARIL) 25 MG CAP    TAKE 1 CAPSULE(25 MG) BY MOUTH EVERY 8 HOURS AS NEEDED FOR ITCHING OR ANXIETY    LINACLOTIDE (LINZESS) 290 MCG CAP CAPSULE     Take 1 capsule (290 mcg total) by mouth before breakfast.    METHOCARBAMOL (ROBAXIN) 500 MG TAB    Take 1 tablet (500 mg total) by mouth 3 (three) times daily as needed (muscle spasm/pain. May cause drowsiness.).    NAPROXEN (NAPROSYN) 500 MG TABLET    Take 1 tablet (500 mg total) by mouth 2 (two) times daily as needed (joint pain. Take with food.).    PANTOPRAZOLE (PROTONIX) 40 MG TABLET    Take 1 tablet (40 mg total) by mouth once daily.    PREGABALIN (LYRICA) 100 MG CAPSULE    Take 1 capsule (100 mg total) by mouth 2 (two) times daily.    TRAZODONE (DESYREL) 100 MG TABLET    Take 1 tablet (100 mg total) by mouth every evening.   Changed and/or Refilled Medications    Modified Medication Previous Medication    FUROSEMIDE (LASIX) 20 MG TABLET furosemide (LASIX) 20 MG tablet       Take 1 tablet (20 mg total) by mouth once daily.    Take 1 tablet (20 mg total) by mouth daily as needed (swelling).    WEGOVY 0.25 MG/0.5 ML PNIJ WEGOVY 0.25 mg/0.5 mL PnIj       Inject 0.25 mg into the skin once a week.    Inject into the skin.   Discontinued Medications    ERGOCALCIFEROL (ERGOCALCIFEROL) 50,000 UNIT CAP    Take 1 capsule (50,000 Units total) by mouth every 7 days.        Allergies: Sulfa (sulfonamide antibiotics)      Past Medical History:    Past Medical History:   Diagnosis Date    Arthritis     GERD (gastroesophageal reflux disease)     History of rectal polyps 08/07/2018       Past Surgical History:    Past Surgical History:   Procedure Laterality Date    breast reduction      CHOLECYSTECTOMY  10/28/2013    Dr. Paige    COLONOSCOPY  2018    gastric sleeve      HYSTERECTOMY  10/28/2013    Robotic, BSO,Cystoscopy, TOT-    OOPHORECTOMY      TUBAL LIGATION      tummy tuck           Social History:    Social History     Tobacco Use   Smoking Status Never    Passive exposure: Never   Smokeless Tobacco Never     Social History     Substance and Sexual Activity   Alcohol Use Yes    Comment: occasionally     Social  "History     Substance and Sexual Activity   Drug Use Never         Family History:    Family History   Problem Relation Age of Onset    Colon cancer Sister        Review of Systems:    Review of Systems   Constitutional:  Negative for appetite change, chills, fatigue, fever and unexpected weight change.   Eyes:  Negative for visual disturbance.   Respiratory:  Negative for cough and shortness of breath.    Cardiovascular:  Positive for leg swelling. Negative for chest pain.   Gastrointestinal:  Negative for abdominal pain, change in bowel habit, constipation, diarrhea, nausea and vomiting.   Musculoskeletal:  Positive for arthralgias.        + carpal tunnel related pain   Integumentary:  Negative for rash.   Neurological:  Negative for dizziness and headaches.   Psychiatric/Behavioral:  The patient is not nervous/anxious.         Vitals:    Vitals:    09/19/23 1609   BP: 125/85   BP Location: Left arm   Patient Position: Sitting   BP Method: Medium (Automatic)   Pulse: 64   Resp: 16   Temp: 97.9 °F (36.6 °C)   TempSrc: Oral   SpO2: 97%   Weight: 115.1 kg (253 lb 12.8 oz)   Height: 5' 7" (1.702 m)       Body mass index is 39.75 kg/m².    Wt Readings from Last 3 Encounters:   09/19/23 1609 115.1 kg (253 lb 12.8 oz)   07/17/23 1409 110.7 kg (244 lb)   07/10/23 1411 111.4 kg (245 lb 9.6 oz)        Physical Exam:    Physical Exam  Constitutional:       General: She is not in acute distress.     Appearance: Normal appearance. She is obese.   HENT:      Nose: Nose normal.      Mouth/Throat:      Mouth: Mucous membranes are moist.      Pharynx: Oropharynx is clear.   Eyes:      Conjunctiva/sclera: Conjunctivae normal.   Cardiovascular:      Rate and Rhythm: Normal rate and regular rhythm.      Heart sounds: Normal heart sounds. No murmur heard.  Pulmonary:      Effort: Pulmonary effort is normal. No respiratory distress.      Breath sounds: Normal breath sounds. No wheezing, rhonchi or rales.   Abdominal:      General: " Bowel sounds are normal.      Palpations: Abdomen is soft.      Tenderness: There is no abdominal tenderness.   Musculoskeletal:         General: Normal range of motion.      Cervical back: Neck supple.      Comments: + trace BLE edema   Skin:     Findings: No rash.   Neurological:      General: No focal deficit present.      Mental Status: She is alert. Mental status is at baseline.   Psychiatric:         Mood and Affect: Mood normal.         Assessment/Plan:   1. Class 2 obesity due to excess calories without serious comorbidity with body mass index (BMI) of 36.0 to 36.9 in adult  -     WEGOVY 0.25 mg/0.5 mL PnIj; Inject 0.25 mg into the skin once a week.  Dispense: 2 mL; Refill: 5- new medication. Risks/benefits/potential side effects/black box warning reviewed and discussed with patient.   Denies personal or family h/o thyroid cancer  Denies h/o pancreatitis    2. Localized edema  -     furosemide (LASIX) 20 MG tablet; Take 1 tablet (20 mg total) by mouth once daily.  Dispense: 90 tablet; Refill: 1- new medication. Risks/benefits/potential side effects/black box warning reviewed and discussed with patient.     3. Gastroesophageal reflux disease without esophagitis  - change protonix to QHS from daily    4. Carpal tunnel syndrome of right wrist  -     Ambulatory referral/consult to Orthopedics; Future; Expected date: 09/26/2023    Records requested from recent nerve conduction study.    Active Problem List with Overview Notes    Diagnosis Date Noted    Primary hypertension 06/28/2022    Osteoarthrosis multiple sites, not specified as generalized 06/22/2023    Other insomnia 11/03/2022    Class 2 obesity due to excess calories without serious comorbidity with body mass index (BMI) of 36.0 to 36.9 in adult 11/03/2022    Arthritis 06/28/2022    Cervical radiculopathy 07/13/2023    Lumbosacral radiculopathy 06/22/2023    Bulging lumbar disc 05/03/2023    Gastroesophageal reflux disease without esophagitis 11/03/2022     Chronic constipation 06/28/2022    Hiatal hernia 05/03/2023    Seasonal allergic rhinitis 06/28/2022    Colon cancer screening 08/28/2023        RTC as scheduled for chronic follow up. RTC sooner if needed.  Patient voiced understanding and is agreeable to plan.      Aileen Rodriguez MD    Family Medicine

## 2023-09-20 DIAGNOSIS — M25.531 RIGHT WRIST PAIN: Primary | ICD-10-CM

## 2023-09-21 ENCOUNTER — OFFICE VISIT (OUTPATIENT)
Dept: ORTHOPEDICS | Facility: CLINIC | Age: 53
End: 2023-09-21
Payer: COMMERCIAL

## 2023-09-21 ENCOUNTER — HOSPITAL ENCOUNTER (OUTPATIENT)
Dept: RADIOLOGY | Facility: HOSPITAL | Age: 53
Discharge: HOME | End: 2023-09-21
Attending: NURSE PRACTITIONER
Payer: COMMERCIAL

## 2023-09-21 DIAGNOSIS — G56.01 CARPAL TUNNEL SYNDROME OF RIGHT WRIST: ICD-10-CM

## 2023-09-21 DIAGNOSIS — M25.531 RIGHT WRIST PAIN: ICD-10-CM

## 2023-09-21 PROCEDURE — 3044F HG A1C LEVEL LT 7.0%: CPT | Mod: ,,, | Performed by: NURSE PRACTITIONER

## 2023-09-21 PROCEDURE — 3044F PR MOST RECENT HEMOGLOBIN A1C LEVEL <7.0%: ICD-10-PCS | Mod: ,,, | Performed by: NURSE PRACTITIONER

## 2023-09-21 PROCEDURE — 99203 PR OFFICE/OUTPT VISIT, NEW, LEVL III, 30-44 MIN: ICD-10-PCS | Mod: S$PBB,,, | Performed by: NURSE PRACTITIONER

## 2023-09-21 PROCEDURE — 73110 X-RAY EXAM OF WRIST: CPT | Mod: TC,RT

## 2023-09-21 PROCEDURE — 1159F MED LIST DOCD IN RCRD: CPT | Mod: ,,, | Performed by: NURSE PRACTITIONER

## 2023-09-21 PROCEDURE — 99203 OFFICE O/P NEW LOW 30 MIN: CPT | Mod: S$PBB,,, | Performed by: NURSE PRACTITIONER

## 2023-09-21 PROCEDURE — 99213 OFFICE O/P EST LOW 20 MIN: CPT | Mod: PBBFAC | Performed by: NURSE PRACTITIONER

## 2023-09-21 PROCEDURE — 73110 X-RAY EXAM OF WRIST: CPT | Mod: 26,RT,, | Performed by: RADIOLOGY

## 2023-09-21 PROCEDURE — 1160F PR REVIEW ALL MEDS BY PRESCRIBER/CLIN PHARMACIST DOCUMENTED: ICD-10-PCS | Mod: ,,, | Performed by: NURSE PRACTITIONER

## 2023-09-21 PROCEDURE — 3066F PR DOCUMENTATION OF TREATMENT FOR NEPHROPATHY: ICD-10-PCS | Mod: ,,, | Performed by: NURSE PRACTITIONER

## 2023-09-21 PROCEDURE — 1160F RVW MEDS BY RX/DR IN RCRD: CPT | Mod: ,,, | Performed by: NURSE PRACTITIONER

## 2023-09-21 PROCEDURE — 3061F PR NEG MICROALBUMINURIA RESULT DOCUMENTED/REVIEW: ICD-10-PCS | Mod: ,,, | Performed by: NURSE PRACTITIONER

## 2023-09-21 PROCEDURE — 1159F PR MEDICATION LIST DOCUMENTED IN MEDICAL RECORD: ICD-10-PCS | Mod: ,,, | Performed by: NURSE PRACTITIONER

## 2023-09-21 PROCEDURE — 3061F NEG MICROALBUMINURIA REV: CPT | Mod: ,,, | Performed by: NURSE PRACTITIONER

## 2023-09-21 PROCEDURE — 73110 XR WRIST COMPLETE 3 VIEWS RIGHT: ICD-10-PCS | Mod: 26,RT,, | Performed by: RADIOLOGY

## 2023-09-21 PROCEDURE — 3066F NEPHROPATHY DOC TX: CPT | Mod: ,,, | Performed by: NURSE PRACTITIONER

## 2023-09-21 NOTE — PROGRESS NOTES
53-year-old female presents ambulatory orthopedic clinic valuation of bilateral wrist.  She relates a history x7 months of bilateral hand discomfort.  She has numbness in her thumb index long and ring fingers bilaterally.  Symptoms are worse during HS.  Also when a trying to talk on the phone, driving or brushing her hair.  States she had nerve conduction studies performed at  in [place], [place] last week.  She reports that they told her they would fax him to her primary care provider.  She was seen by her primary care provider on 09/19/2023.  She was referred to orthopedics to Dr. Rodriguez for consideration of surgical intervention for bilateral carpal tunnel syndrome.  Nerve conduction study results are yet to be obtained.      X-ray: X-rays today of right wrist AP, lateral and oblique view shows mild DJD changes.  No evidence acute fracture, dislocation or pathologic bone noted.    PE:  In general patient is awake, alert oriented appropriately.  No acute distress noted.  Respirations are even unlabored.  Skin is warm dry and intact.  Right radial pulse is 2 out of 4.  Capillary refill all digits of the right hand is less than 3 seconds.  She has good range of motion of her hand without elicitation of pain.  Tinel sign over the median nerve is negative bilaterally.  Phalen's test is negative bilaterally after 1 minute.    Impression:  Symptoms of carpal tunnel syndrome     Plan:  Safety guidelines and activity restrictions are discussed with the patient.  She verbalizes understanding.  We discussed treatment options to include but not limited to oral NSAID medication.  States she was told after having a gastric sleeve to avoid use of all NSAIDs.  Also discussed in general terms carpal tunnel release surgery and beer block.  Verbalizes understanding.  She does understand she will have to discuss the surgery more in detail and whether or not it is indicated with Dr. Rodriguez.  We will attempt  to obtain copies of her nerve conduction study.  Night splint is issued for right side.  We will have return to orthopedic clinic with Dr. Rodriguez to discuss treatment options.  May return sooner as needed.

## 2023-09-21 NOTE — PATIENT INSTRUCTIONS
Safety guidelines and activity restrictions are discussed with the patient.  She verbalizes understanding.  We discussed treatment options to include but not limited to oral NSAID medication.  States she was told after having a gastric sleeve to avoid use of all NSAIDs.  Also discussed in general terms carpal tunnel release surgery and beer block.  Verbalizes understanding.  She does understand she will have to discuss the surgery more in detail and whether or not it is indicated with Dr. Rodriguez.  We will attempt to obtain copies of her nerve conduction study.  Night splint is issued for right side.  We will have return to orthopedic clinic with Dr. Rodriguez to discuss treatment options.  May return sooner as needed.

## 2023-09-22 DIAGNOSIS — K21.9 GASTROESOPHAGEAL REFLUX DISEASE WITHOUT ESOPHAGITIS: Chronic | ICD-10-CM

## 2023-09-22 RX ORDER — PANTOPRAZOLE SODIUM 40 MG/1
40 TABLET, DELAYED RELEASE ORAL
Qty: 90 TABLET | Refills: 1 | Status: SHIPPED | OUTPATIENT
Start: 2023-09-22 | End: 2023-12-13 | Stop reason: HOSPADM

## 2023-09-25 ENCOUNTER — OFFICE VISIT (OUTPATIENT)
Dept: ORTHOPEDICS | Facility: CLINIC | Age: 53
End: 2023-09-25
Payer: COMMERCIAL

## 2023-09-25 VITALS
BODY MASS INDEX: 39.71 KG/M2 | RESPIRATION RATE: 12 BRPM | TEMPERATURE: 98 F | HEART RATE: 64 BPM | WEIGHT: 253 LBS | HEIGHT: 67 IN | OXYGEN SATURATION: 98 %

## 2023-09-25 DIAGNOSIS — G56.03 CARPAL TUNNEL SYNDROME ON BOTH SIDES: Primary | ICD-10-CM

## 2023-09-25 DIAGNOSIS — G56.01 CARPAL TUNNEL SYNDROME OF RIGHT WRIST: Primary | ICD-10-CM

## 2023-09-25 PROCEDURE — 99499 UNLISTED E&M SERVICE: CPT | Mod: S$PBB,,, | Performed by: NURSE PRACTITIONER

## 2023-09-25 PROCEDURE — 99215 OFFICE O/P EST HI 40 MIN: CPT | Mod: PBBFAC | Performed by: NURSE PRACTITIONER

## 2023-09-25 PROCEDURE — 99499 NO LOS: ICD-10-PCS | Mod: S$PBB,,, | Performed by: NURSE PRACTITIONER

## 2023-09-25 NOTE — PROGRESS NOTES
53-year-old female presents back today for EMG nerve conduction study results.  She had studies performed on 09/13/2023.  Nerve studies reveal moderate right median neuropathy at the wrist, mild to moderate left median neuropathy at the wrist, mild bilateral ulnar neuropathy at the elbows.  She has been wearing a carpal tunnel splint on the right.  She states right does feel better.  She was having more difficulty with the left at this time.  We will give her a splint for the left.    Impression bilateral carpal tunnel syndrome at the wrist.      Plan:  Discussed in general terms surgical intervention.  Discussed the use of steroid injections.  Discussed potential recovery time.  Verbalizes understanding.  We will set her up for right carpal tunnel release surgery.  She understands this is a tentative date and will depend on her follow-up visit with Dr. Rodriguez on 09/27/2023 to discuss treatment options to include but not limited to surgery with risks and benefits.  She was given a carpal tunnel splint for her left wrist.

## 2023-09-25 NOTE — PATIENT INSTRUCTIONS
Discussed in general terms surgical intervention.  Discussed the use of steroid injections.  Discussed potential recovery time.  Verbalizes understanding.  We will set her up for right carpal tunnel release surgery.  She understands this is a tentative date and will depend on her follow-up visit with Dr. Rodriguez on 09/27/2023 to discuss treatment options to include but not limited to surgery with risks and benefits.  She was given a carpal tunnel splint for her left wrist.

## 2023-09-27 ENCOUNTER — OFFICE VISIT (OUTPATIENT)
Dept: ORTHOPEDICS | Facility: CLINIC | Age: 53
End: 2023-09-27
Payer: COMMERCIAL

## 2023-09-27 DIAGNOSIS — M65.4 RADIAL STYLOID TENOSYNOVITIS (DE QUERVAIN): ICD-10-CM

## 2023-09-27 DIAGNOSIS — G56.03 BILATERAL CARPAL TUNNEL SYNDROME: ICD-10-CM

## 2023-09-27 DIAGNOSIS — G56.02 CARPAL TUNNEL SYNDROME, LEFT: Primary | ICD-10-CM

## 2023-09-27 PROCEDURE — 20526 PR INJECT CARPAL TUNNEL: ICD-10-PCS | Mod: LT,,, | Performed by: ORTHOPAEDIC SURGERY

## 2023-09-27 PROCEDURE — 99214 PR OFFICE/OUTPT VISIT, EST, LEVL IV, 30-39 MIN: ICD-10-PCS | Mod: 25,,, | Performed by: ORTHOPAEDIC SURGERY

## 2023-09-27 PROCEDURE — 1159F PR MEDICATION LIST DOCUMENTED IN MEDICAL RECORD: ICD-10-PCS | Mod: ,,, | Performed by: ORTHOPAEDIC SURGERY

## 2023-09-27 PROCEDURE — 3044F PR MOST RECENT HEMOGLOBIN A1C LEVEL <7.0%: ICD-10-PCS | Mod: ,,, | Performed by: ORTHOPAEDIC SURGERY

## 2023-09-27 PROCEDURE — 1160F RVW MEDS BY RX/DR IN RCRD: CPT | Mod: ,,, | Performed by: ORTHOPAEDIC SURGERY

## 2023-09-27 PROCEDURE — 1159F MED LIST DOCD IN RCRD: CPT | Mod: ,,, | Performed by: ORTHOPAEDIC SURGERY

## 2023-09-27 PROCEDURE — 99214 OFFICE O/P EST MOD 30 MIN: CPT | Mod: 25,,, | Performed by: ORTHOPAEDIC SURGERY

## 2023-09-27 PROCEDURE — 3061F NEG MICROALBUMINURIA REV: CPT | Mod: ,,, | Performed by: ORTHOPAEDIC SURGERY

## 2023-09-27 PROCEDURE — 3066F NEPHROPATHY DOC TX: CPT | Mod: ,,, | Performed by: ORTHOPAEDIC SURGERY

## 2023-09-27 PROCEDURE — 3044F HG A1C LEVEL LT 7.0%: CPT | Mod: ,,, | Performed by: ORTHOPAEDIC SURGERY

## 2023-09-27 PROCEDURE — 20526 THER INJECTION CARP TUNNEL: CPT | Mod: LT,,, | Performed by: ORTHOPAEDIC SURGERY

## 2023-09-27 PROCEDURE — 3061F PR NEG MICROALBUMINURIA RESULT DOCUMENTED/REVIEW: ICD-10-PCS | Mod: ,,, | Performed by: ORTHOPAEDIC SURGERY

## 2023-09-27 PROCEDURE — 3066F PR DOCUMENTATION OF TREATMENT FOR NEPHROPATHY: ICD-10-PCS | Mod: ,,, | Performed by: ORTHOPAEDIC SURGERY

## 2023-09-27 PROCEDURE — 1160F PR REVIEW ALL MEDS BY PRESCRIBER/CLIN PHARMACIST DOCUMENTED: ICD-10-PCS | Mod: ,,, | Performed by: ORTHOPAEDIC SURGERY

## 2023-09-27 RX ORDER — TRIAMCINOLONE ACETONIDE 40 MG/ML
40 INJECTION, SUSPENSION INTRA-ARTICULAR; INTRAMUSCULAR
Status: COMPLETED | OUTPATIENT
Start: 2023-09-27 | End: 2023-09-27

## 2023-09-27 RX ORDER — BUPIVACAINE HYDROCHLORIDE 2.5 MG/ML
1 INJECTION, SOLUTION INFILTRATION; PERINEURAL
Status: COMPLETED | OUTPATIENT
Start: 2023-09-27 | End: 2023-09-27

## 2023-09-27 RX ADMIN — BUPIVACAINE HYDROCHLORIDE 2.5 MG: 2.5 INJECTION, SOLUTION INFILTRATION; PERINEURAL at 10:09

## 2023-09-27 RX ADMIN — TRIAMCINOLONE ACETONIDE 40 MG: 40 INJECTION, SUSPENSION INTRA-ARTICULAR; INTRAMUSCULAR at 10:09

## 2023-09-27 NOTE — PROGRESS NOTES
CLINIC NOTE       Chief Complaint   Patient presents with    Results        Marcell Dickson is a 53 y.o. female seen today for evaluation of bilateral wrist pain/finger numbness.  Symptoms began simultaneously and insidiously 7 months ago.  No history of trauma.  Initially the right side was most symptomatic but her left side is also escalating in symptoms.  She a typical history for carpal tunnel syndrome.  She is worn bilateral Velcro wrist splints without relief of symptoms and used oral anti-inflammatory medications.  Her symptoms have been refractory to conservative management.  She underwent EMG/nerve conduction studies both upper extremities performed by Dr. Camilo an (neurology) on 09/13/2023.  Studies demonstrate moderate right carpal tunnel syndrome and mild-to-moderate left carpal tunnel syndrome.  Mild bilateral ulnar neuropathies at the elbows.  She does not have any ulnar nerve distribution symptoms.  X-rays both wrists 09/21/2023 showed carpus to be normally aligned with no evidence of fracture, dislocation or pathologic bone.    Past Medical History:   Diagnosis Date    Arthritis     GERD (gastroesophageal reflux disease)     History of rectal polyps 08/07/2018     Family History   Problem Relation Age of Onset    Colon cancer Sister      Current Outpatient Medications on File Prior to Visit   Medication Sig Dispense Refill    cetirizine (ZYRTEC) 10 MG tablet Take 1 tablet (10 mg total) by mouth once daily. 90 tablet 1    furosemide (LASIX) 20 MG tablet Take 1 tablet (20 mg total) by mouth once daily. 90 tablet 1    GAVILYTE-G 236-22.74-6.74 -5.86 gram suspension Take 4,000 mLs by mouth once.      hydroCHLOROthiazide (HYDRODIURIL) 25 MG tablet Take 1 tablet (25 mg total) by mouth once daily. 90 tablet 1    hydrOXYzine pamoate (VISTARIL) 25 MG Cap TAKE 1 CAPSULE(25 MG) BY MOUTH EVERY 8 HOURS AS NEEDED FOR ITCHING OR ANXIETY 45 capsule 2    linaCLOtide (LINZESS) 290 mcg Cap capsule  Take 1 capsule (290 mcg total) by mouth before breakfast. 90 capsule 1    methocarbamoL (ROBAXIN) 500 MG Tab Take 1 tablet (500 mg total) by mouth 3 (three) times daily as needed (muscle spasm/pain. May cause drowsiness.). 45 tablet 0    naproxen (NAPROSYN) 500 MG tablet Take 1 tablet (500 mg total) by mouth 2 (two) times daily as needed (joint pain. Take with food.). 45 tablet 1    pantoprazole (PROTONIX) 40 MG tablet TAKE 1 TABLET(40 MG) BY MOUTH EVERY DAY 90 tablet 1    pregabalin (LYRICA) 100 MG capsule Take 1 capsule (100 mg total) by mouth 2 (two) times daily. 60 capsule 2    traZODone (DESYREL) 100 MG tablet Take 1 tablet (100 mg total) by mouth every evening. 90 tablet 1    WEGOVY 0.25 mg/0.5 mL PnIj Inject 0.25 mg into the skin once a week. 2 mL 5     No current facility-administered medications on file prior to visit.       ROS     There were no vitals filed for this visit.    Past Surgical History:   Procedure Laterality Date    breast reduction      CHOLECYSTECTOMY  10/28/2013    Dr. Paige    COLONOSCOPY  2018    gastric sleeve      HYSTERECTOMY  10/28/2013    Robotic, BSO,Cystoscopy, TOT-    OOPHORECTOMY      TUBAL LIGATION      tummy tuck          Review of patient's allergies indicates:   Allergen Reactions    Sulfa (sulfonamide antibiotics)         Ortho Exam:  well-developed well-nourished black female no acute distress.  She is alert oriented cooperative.  Neck is supple without JVD.  Breathing is regular nonlabored.  Skin is warm and dry no lesions seen.  Right upper extremity has good motion of shoulder elbow forearm and wrist.  There is a painful nodule palpable in the radial styloid region with a positive Finkelstein test.  Positive Tinel's test over the volar carpus region.  There is a positive Tinel's test over the volar carpus of the left wrist with a positive Phalen's test both wrists 30 seconds.  He is otherwise good cap refill to all fingertips and thumb.    Radiographic  Examination:    Technique:    Findings:    Impression:   See Above    Assessment and Plan  Patient Active Problem List    Diagnosis Date Noted    Carpal tunnel syndrome on both sides 09/21/2023    Colon cancer screening 08/28/2023    Cervical radiculopathy 07/13/2023    Lumbosacral radiculopathy 06/22/2023    Osteoarthrosis multiple sites, not specified as generalized 06/22/2023    Bulging lumbar disc 05/03/2023    Hiatal hernia 05/03/2023    Other insomnia 11/03/2022    Gastroesophageal reflux disease without esophagitis 11/03/2022    Class 2 obesity due to excess calories without serious comorbidity with body mass index (BMI) of 36.0 to 36.9 in adult 11/03/2022    Seasonal allergic rhinitis 06/28/2022    Primary hypertension 06/28/2022    Arthritis 06/28/2022    Chronic constipation 06/28/2022    Impression:  1.  Right carpal tunnel syndrome/de Quervain tenosynovitis with cyst formation-right wrist 2.  Left carpal tunnel syndrome   Plan:  The option for right carpal tunnel surgical release and 1st dorsal extensor compartment release with ganglion cyst excision of the right wrist discussed.  Potential benefits risks surgery outlined to include but not limited to bleeding, infection damage to blood vessels nerves, need for further surgery, other risks and complications including even death the patient wished to proceed.  3..  Today left wrist along the volar surface prepped Betadine and carpal tunnel steroid injection performed with triamcinolone and Marcaine 1 cc each.  We discussed right wrist surgery as an outpatient under beer block or general anesthesia.        Hong Rodriguez M.D.

## 2023-09-28 ENCOUNTER — HOSPITAL ENCOUNTER (OUTPATIENT)
Facility: HOSPITAL | Age: 53
Discharge: HOME | End: 2023-09-28
Attending: ORTHOPAEDIC SURGERY | Admitting: ORTHOPAEDIC SURGERY
Payer: COMMERCIAL

## 2023-09-28 ENCOUNTER — ANESTHESIA EVENT (OUTPATIENT)
Dept: SURGERY | Facility: HOSPITAL | Age: 53
End: 2023-09-28
Payer: COMMERCIAL

## 2023-09-28 ENCOUNTER — ANESTHESIA (OUTPATIENT)
Dept: SURGERY | Facility: HOSPITAL | Age: 53
End: 2023-09-28
Payer: COMMERCIAL

## 2023-09-28 VITALS
OXYGEN SATURATION: 97 % | RESPIRATION RATE: 16 BRPM | DIASTOLIC BLOOD PRESSURE: 88 MMHG | BODY MASS INDEX: 32.14 KG/M2 | SYSTOLIC BLOOD PRESSURE: 141 MMHG | WEIGHT: 200 LBS | TEMPERATURE: 98 F | HEIGHT: 66 IN | HEART RATE: 65 BPM

## 2023-09-28 DIAGNOSIS — G56.01 CARPAL TUNNEL SYNDROME OF RIGHT WRIST: Primary | ICD-10-CM

## 2023-09-28 DIAGNOSIS — M67.40 GANGLION CYST: ICD-10-CM

## 2023-09-28 PROCEDURE — 88304 SURGICAL PATHOLOGY: ICD-10-PCS | Mod: 26,,, | Performed by: PATHOLOGY

## 2023-09-28 PROCEDURE — 71000015 HC POSTOP RECOV 1ST HR: Performed by: ORTHOPAEDIC SURGERY

## 2023-09-28 PROCEDURE — D9220A PRA ANESTHESIA: ICD-10-PCS | Mod: ANES,,, | Performed by: ANESTHESIOLOGY

## 2023-09-28 PROCEDURE — 64721 CARPAL TUNNEL SURGERY: CPT | Mod: RT,,, | Performed by: ORTHOPAEDIC SURGERY

## 2023-09-28 PROCEDURE — 25000 PR INCIS TENDON SHEATH,RADIAL STYLOID: ICD-10-PCS | Mod: 51,RT,, | Performed by: ORTHOPAEDIC SURGERY

## 2023-09-28 PROCEDURE — 25111 PR EXCIS PRIMARY GANGLION WRIST: ICD-10-PCS | Mod: 59,RT,, | Performed by: ORTHOPAEDIC SURGERY

## 2023-09-28 PROCEDURE — 25000003 PHARM REV CODE 250: Performed by: ORTHOPAEDIC SURGERY

## 2023-09-28 PROCEDURE — 63600175 PHARM REV CODE 636 W HCPCS: Performed by: ANESTHESIOLOGY

## 2023-09-28 PROCEDURE — D9220A PRA ANESTHESIA: ICD-10-PCS | Mod: CRNA,,, | Performed by: ANESTHESIOLOGY

## 2023-09-28 PROCEDURE — 37000009 HC ANESTHESIA EA ADD 15 MINS: Performed by: ORTHOPAEDIC SURGERY

## 2023-09-28 PROCEDURE — 64721 PR REVISE MEDIAN N/CARPAL TUNNEL SURG: ICD-10-PCS | Mod: RT,,, | Performed by: ORTHOPAEDIC SURGERY

## 2023-09-28 PROCEDURE — 36000706: Performed by: ORTHOPAEDIC SURGERY

## 2023-09-28 PROCEDURE — 25000003 PHARM REV CODE 250: Performed by: ANESTHESIOLOGY

## 2023-09-28 PROCEDURE — 36000707: Performed by: ORTHOPAEDIC SURGERY

## 2023-09-28 PROCEDURE — 25111 REMOVE WRIST TENDON LESION: CPT | Mod: 59,RT,, | Performed by: ORTHOPAEDIC SURGERY

## 2023-09-28 PROCEDURE — 37000008 HC ANESTHESIA 1ST 15 MINUTES: Performed by: ORTHOPAEDIC SURGERY

## 2023-09-28 PROCEDURE — 71000016 HC POSTOP RECOV ADDL HR: Performed by: ORTHOPAEDIC SURGERY

## 2023-09-28 PROCEDURE — 71000033 HC RECOVERY, INTIAL HOUR: Performed by: ORTHOPAEDIC SURGERY

## 2023-09-28 PROCEDURE — D9220A PRA ANESTHESIA: Mod: ANES,,, | Performed by: ANESTHESIOLOGY

## 2023-09-28 PROCEDURE — 25000 INCISION OF TENDON SHEATH: CPT | Mod: 51,RT,, | Performed by: ORTHOPAEDIC SURGERY

## 2023-09-28 PROCEDURE — D9220A PRA ANESTHESIA: Mod: CRNA,,, | Performed by: ANESTHESIOLOGY

## 2023-09-28 PROCEDURE — 88304 TISSUE EXAM BY PATHOLOGIST: CPT | Mod: TC,SUR | Performed by: ORTHOPAEDIC SURGERY

## 2023-09-28 PROCEDURE — 88304 TISSUE EXAM BY PATHOLOGIST: CPT | Mod: 26,,, | Performed by: PATHOLOGY

## 2023-09-28 RX ORDER — FENTANYL CITRATE 50 UG/ML
INJECTION, SOLUTION INTRAMUSCULAR; INTRAVENOUS
Status: DISCONTINUED | OUTPATIENT
Start: 2023-09-28 | End: 2023-09-28

## 2023-09-28 RX ORDER — ACETAMINOPHEN 500 MG
1000 TABLET ORAL EVERY 6 HOURS PRN
Status: CANCELLED | OUTPATIENT
Start: 2023-09-28

## 2023-09-28 RX ORDER — ONDANSETRON 2 MG/ML
INJECTION INTRAMUSCULAR; INTRAVENOUS
Status: DISCONTINUED | OUTPATIENT
Start: 2023-09-28 | End: 2023-09-28

## 2023-09-28 RX ORDER — MORPHINE SULFATE 10 MG/ML
4 INJECTION INTRAMUSCULAR; INTRAVENOUS; SUBCUTANEOUS EVERY 5 MIN PRN
Status: DISCONTINUED | OUTPATIENT
Start: 2023-09-28 | End: 2023-09-28 | Stop reason: HOSPADM

## 2023-09-28 RX ORDER — ONDANSETRON 4 MG/1
8 TABLET, ORALLY DISINTEGRATING ORAL EVERY 8 HOURS PRN
Status: CANCELLED | OUTPATIENT
Start: 2023-09-28

## 2023-09-28 RX ORDER — HYDROCODONE BITARTRATE AND ACETAMINOPHEN 10; 325 MG/1; MG/1
1 TABLET ORAL EVERY 4 HOURS PRN
Status: CANCELLED | OUTPATIENT
Start: 2023-09-28

## 2023-09-28 RX ORDER — DIPHENHYDRAMINE HYDROCHLORIDE 50 MG/ML
25 INJECTION INTRAMUSCULAR; INTRAVENOUS EVERY 6 HOURS PRN
Status: DISCONTINUED | OUTPATIENT
Start: 2023-09-28 | End: 2023-09-28 | Stop reason: HOSPADM

## 2023-09-28 RX ORDER — PROMETHAZINE HYDROCHLORIDE 25 MG/1
25 TABLET ORAL EVERY 6 HOURS PRN
Status: CANCELLED | OUTPATIENT
Start: 2023-09-28

## 2023-09-28 RX ORDER — ONDANSETRON 2 MG/ML
4 INJECTION INTRAMUSCULAR; INTRAVENOUS DAILY PRN
Status: DISCONTINUED | OUTPATIENT
Start: 2023-09-28 | End: 2023-09-28 | Stop reason: HOSPADM

## 2023-09-28 RX ORDER — MIDAZOLAM HYDROCHLORIDE 1 MG/ML
INJECTION INTRAMUSCULAR; INTRAVENOUS
Status: DISCONTINUED | OUTPATIENT
Start: 2023-09-28 | End: 2023-09-28

## 2023-09-28 RX ORDER — HYDROCODONE BITARTRATE AND ACETAMINOPHEN 5; 325 MG/1; MG/1
1 TABLET ORAL EVERY 4 HOURS PRN
Status: CANCELLED | OUTPATIENT
Start: 2023-09-28

## 2023-09-28 RX ORDER — LIDOCAINE HYDROCHLORIDE 20 MG/ML
INJECTION, SOLUTION EPIDURAL; INFILTRATION; INTRACAUDAL; PERINEURAL
Status: DISCONTINUED | OUTPATIENT
Start: 2023-09-28 | End: 2023-09-28

## 2023-09-28 RX ORDER — CEFAZOLIN SODIUM 1 G/3ML
INJECTION, POWDER, FOR SOLUTION INTRAMUSCULAR; INTRAVENOUS
Status: DISCONTINUED | OUTPATIENT
Start: 2023-09-28 | End: 2023-09-28

## 2023-09-28 RX ORDER — HYDROCODONE BITARTRATE AND ACETAMINOPHEN 5; 325 MG/1; MG/1
1 TABLET ORAL EVERY 6 HOURS PRN
Qty: 28 TABLET | Refills: 0 | Status: SHIPPED | OUTPATIENT
Start: 2023-09-28 | End: 2023-10-05

## 2023-09-28 RX ORDER — MEPERIDINE HYDROCHLORIDE 25 MG/ML
25 INJECTION INTRAMUSCULAR; INTRAVENOUS; SUBCUTANEOUS EVERY 10 MIN PRN
Status: DISCONTINUED | OUTPATIENT
Start: 2023-09-28 | End: 2023-09-28 | Stop reason: HOSPADM

## 2023-09-28 RX ORDER — HYDROMORPHONE HYDROCHLORIDE 2 MG/ML
0.5 INJECTION, SOLUTION INTRAMUSCULAR; INTRAVENOUS; SUBCUTANEOUS EVERY 5 MIN PRN
Status: DISCONTINUED | OUTPATIENT
Start: 2023-09-28 | End: 2023-09-28 | Stop reason: HOSPADM

## 2023-09-28 RX ORDER — SODIUM CHLORIDE 9 MG/ML
INJECTION, SOLUTION INTRAVENOUS CONTINUOUS
Status: DISCONTINUED | OUTPATIENT
Start: 2023-09-28 | End: 2023-09-28 | Stop reason: HOSPADM

## 2023-09-28 RX ORDER — PROPOFOL 10 MG/ML
VIAL (ML) INTRAVENOUS
Status: DISCONTINUED | OUTPATIENT
Start: 2023-09-28 | End: 2023-09-28

## 2023-09-28 RX ADMIN — SODIUM CHLORIDE: 9 INJECTION, SOLUTION INTRAVENOUS at 09:09

## 2023-09-28 RX ADMIN — CEFAZOLIN 2000 MG: 1 INJECTION, POWDER, FOR SOLUTION INTRAMUSCULAR; INTRAVENOUS; PARENTERAL at 11:09

## 2023-09-28 RX ADMIN — MIDAZOLAM 2 MG: 1 INJECTION INTRAMUSCULAR; INTRAVENOUS at 10:09

## 2023-09-28 RX ADMIN — LIDOCAINE HYDROCHLORIDE 100 MG: 20 INJECTION, SOLUTION INTRAVENOUS at 10:09

## 2023-09-28 RX ADMIN — FENTANYL CITRATE 50 MCG: 50 INJECTION INTRAMUSCULAR; INTRAVENOUS at 11:09

## 2023-09-28 RX ADMIN — FENTANYL CITRATE 50 MCG: 50 INJECTION INTRAMUSCULAR; INTRAVENOUS at 10:09

## 2023-09-28 RX ADMIN — PROPOFOL 200 MG: 10 INJECTION, EMULSION INTRAVENOUS at 10:09

## 2023-09-28 RX ADMIN — ONDANSETRON 4 MG: 2 INJECTION INTRAMUSCULAR; INTRAVENOUS at 11:09

## 2023-09-28 NOTE — H&P
[facility] - Orthopedic Periop Services  Orthopedics  H&P    Patient Name: Marcell Dickson  MRN: [id number]  Admission Date: 9/28/2023  Primary Care Provider: Arlet Rodriguez MD    Patient information was obtained from patient and ER records.     Subjective:     Principal Problem:Carpal tunnel syndrome of right wrist    Chief Complaint: No chief complaint on file.       HPI: Chief complaint:  Right wrist pain/finger numbness   History:   Marcell Dickson is a 53 y.o. female seen t yesterday for evaluation of bilateral wrist pain/finger numbness.  Symptoms began simultaneously and insidiously 7 months ago.  No history of trauma.  Initially the right side was most symptomatic but her left side is also escalating in symptoms.  She a typical history for carpal tunnel syndrome.  She is worn bilateral Velcro wrist splints without relief of symptoms and used oral anti-inflammatory medications.  Her symptoms have been refractory to conservative management.  She underwent EMG/nerve conduction studies both upper extremities performed by Dr. Camilo an (neurology) on 09/13/2023.  Studies demonstrate moderate right carpal tunnel syndrome and mild-to-moderate left carpal tunnel syndrome.  Mild bilateral ulnar neuropathies at the elbows.  She does not have any ulnar nerve distribution symptoms.  X-rays both wrists 09/21/2023 showed carpus to be normally aligned with no evidence of fracture, dislocation or pathologic bone.  Impression: Right carpal tunnel syndrome  Plan:  Right carpal tunnel surgical release outpatient [place] block or general anesthesia.  The potential benefits and risks of surgery outlined to include but not limited to bleeding, infection, damage to blood vessels and nerves, need for further surgery, other risks and complications including even death the patient wished to proceed.      Past Medical History:   Diagnosis Date    Arthritis     GERD (gastroesophageal reflux disease)      History of rectal polyps 08/07/2018       Past Surgical History:   Procedure Laterality Date    breast reduction      CHOLECYSTECTOMY  10/28/2013    Dr. Paige    COLONOSCOPY  2018    gastric sleeve      HYSTERECTOMY  10/28/2013    Robotic, BSO,Cystoscopy, TOT-    OOPHORECTOMY      TUBAL LIGATION      tummy tuck         Review of patient's allergies indicates:   Allergen Reactions    Sulfa (sulfonamide antibiotics)        No current facility-administered medications for this encounter.     Family History       Problem Relation (Age of Onset)    Colon cancer Sister          Tobacco Use    Smoking status: Never     Passive exposure: Never    Smokeless tobacco: Never   Substance and Sexual Activity    Alcohol use: Yes     Comment: occasionally    Drug use: Never    Sexual activity: Yes     Review of Systems   Constitutional: Negative.     Objective:     Vital Signs (Most Recent):    Vital Signs (24h Range):              There is no height or weight on file to calculate BMI.    No intake or output data in the 24 hours ending 09/28/23 0810     General    Vitals reviewed.  Constitutional: She is oriented to person, place, and time. She appears well-developed and well-nourished.   HENT:   Head: Normocephalic and atraumatic.   Eyes: EOM are normal. Pupils are equal, round, and reactive to light.   Neck: Neck supple.   Cardiovascular:  Normal rate, regular rhythm and normal heart sounds.            Pulmonary/Chest: Effort normal and breath sounds normal.   Abdominal: Soft.   Neurological: She is alert and oriented to person, place, and time.   Psychiatric: Her behavior is normal. Thought content normal.             Right Hand/Wrist Exam     Tests   Phalens sign: positive  Tinel's sign (median nerve): positive  Carpal Tunnel Compression Test: positive  Flexor Digitorum Superficialis Test: normal  Flexor Digitorum Profundus Test: normal      Other     Neuorologic Exam    Median Distribution:  normal  Ulnar Distribution: normal  Radial Distribution: normal      Left Hand/Wrist Exam     Tests   Phalens sign: positive  Tinel's sign (median nerve): positive  Carpal Tunnel Compression Test: positive            Vascular Exam       Capillary Refill  Right Hand: normal capillary refill           Significant Labs: All pertinent labs within the past 24 hours have been reviewed.    Significant Imaging: I have reviewed all pertinent imaging results/findings.    Assessment/Plan:     No notes have been filed under this hospital service.  Service: Orthopedic Surgery      Hong Rodriguez MD  Orthopedics  [facility] - Orthopedic Periop Services

## 2023-09-28 NOTE — H&P
[facility] - Orthopedic Periop Services  Orthopedics  H&P    Patient Name: Marcell Dickson  MRN: [id number]  Admission Date: 9/28/2023  Primary Care Provider: Arlet Rodriguez MD    Patient information was obtained from patient and ER records.     Subjective:     Principal Problem:Carpal tunnel syndrome of right wrist    Chief Complaint: No chief complaint on file.       HPI: Chief complaint:  Right wrist pain/finger numbness   History:   Marcell Dickson is a 53 y.o. female seen t yesterday for evaluation of bilateral wrist pain/finger numbness.  Symptoms began simultaneously and insidiously 7 months ago.  No history of trauma.  Initially the right side was most symptomatic but her left side is also escalating in symptoms.  She a typical history for carpal tunnel syndrome.  She is worn bilateral Velcro wrist splints without relief of symptoms and used oral anti-inflammatory medications.  Her symptoms have been refractory to conservative management.  She underwent EMG/nerve conduction studies both upper extremities performed by Dr. Camilo an (neurology) on 09/13/2023.  Studies demonstrate moderate right carpal tunnel syndrome and mild-to-moderate left carpal tunnel syndrome.  Mild bilateral ulnar neuropathies at the elbows.  She does not have any ulnar nerve distribution symptoms.  X-rays both wrists 09/21/2023 showed carpus to be normally aligned with no evidence of fracture, dislocation or pathologic bone.  Impression: Right carpal tunnel syndrome  Plan:  Right carpal tunnel surgical release outpatient [place] block or general anesthesia.  The potential benefits and risks of surgery outlined to include but not limited to bleeding, infection, damage to blood vessels and nerves, need for further surgery, other risks and complications including even death the patient wished to proceed.      No new subjective & objective note has been filed under this hospital service since the last note  was generated.    Assessment/Plan:     No notes have been filed under this hospital service.  Service: Orthopedic Surgery      Hong Rodriguez MD  Orthopedics  [facility] - Orthopedic Periop [facility]

## 2023-09-28 NOTE — HPI
Chief complaint:  Right wrist pain/finger numbness   History:   Marcell Dickson is a 53 y.o. female seen t yesterday for evaluation of bilateral wrist pain/finger numbness.  Symptoms began simultaneously and insidiously 7 months ago.  No history of trauma.  Initially the right side was most symptomatic but her left side is also escalating in symptoms.  She a typical history for carpal tunnel syndrome.  She is worn bilateral Velcro wrist splints without relief of symptoms and used oral anti-inflammatory medications.  Her symptoms have been refractory to conservative management.  She underwent EMG/nerve conduction studies both upper extremities performed by Dr. Camilo an (neurology) on 09/13/2023.  Studies demonstrate moderate right carpal tunnel syndrome and mild-to-moderate left carpal tunnel syndrome.  Mild bilateral ulnar neuropathies at the elbows.  She does not have any ulnar nerve distribution symptoms.  X-rays both wrists 09/21/2023 showed carpus to be normally aligned with no evidence of fracture, dislocation or pathologic bone.  Impression: Right carpal tunnel syndrome  Plan:  Right carpal tunnel surgical release outpatient [place] block or general anesthesia.  The potential benefits and risks of surgery outlined to include but not limited to bleeding, infection, damage to blood vessels and nerves, need for further surgery, other risks and complications including even death the patient wished to proceed.

## 2023-09-28 NOTE — DISCHARGE SUMMARY
[facility] - Orthopedic Periop Services  Discharge Note  Short Stay    Procedure(s) (LRB):  RELEASE, CARPAL TUNNEL (Right)  EXCISION, GANGLION CYST, WRIST (Right)  RELEASE, HAND, FOR DEQUERVAIN'S TENOSYNOVITIS (Right)      OUTCOME: Patient tolerated treatment/procedure well without complication and is now ready for discharge.    DISPOSITION: Home or Self Care    FINAL DIAGNOSIS:  Carpal tunnel syndrome of right wrist    FOLLOWUP: In clinic    DISCHARGE INSTRUCTIONS:    Discharge Procedure Orders   Diet general     Keep surgical extremity elevated     Ice to affected area   Order Comments: using barrier between ice and skin (specify duration&frequency)     Call MD for:  temperature >100.4     Call MD for:  persistent nausea and vomiting     Call MD for:  severe uncontrolled pain     Call MD for:  difficulty breathing, headache or visual disturbances     Call MD for:  redness, tenderness, or signs of infection (pain, swelling, redness, odor or green/yellow discharge around incision site)     Call MD for:  hives     Call MD for:  persistent dizziness or light-headedness     Call MD for:  extreme fatigue     Leave dressing on - Keep it clean, dry, and intact until clinic visit     Activity as tolerated     Weight bearing as tolerated        TIME SPENT ON DISCHARGE: 15 minutes

## 2023-09-28 NOTE — TRANSFER OF CARE
"Anesthesia Transfer of Care Note    Patient: Marcell SimonRosaman    Procedure(s) Performed: Procedure(s) (LRB):  RELEASE, CARPAL TUNNEL (Right)  EXCISION, GANGLION CYST, WRIST (Right)  RELEASE, HAND, FOR DEQUERVAIN'S TENOSYNOVITIS (Right)    Patient location: PACU    Anesthesia Type: general    Transport from OR: Transported from OR on room air with adequate spontaneous ventilation    Post pain: adequate analgesia    Post assessment: no apparent anesthetic complications and tolerated procedure well    Post vital signs: stable    Level of consciousness: responds to stimulation    Nausea/Vomiting: no nausea/vomiting    Complications: none    Transfer of care protocol was followedComments: Report Given to PACU rn VSS      Last vitals:   Visit Vitals  BP (!) 147/84 (BP Location: Left arm, Patient Position: Lying)   Pulse 79   Temp 36.7 °C (98 °F) (Oral)   Resp 12   Ht 5' 6" (1.676 m)   Wt 90.7 kg (200 lb)   SpO2 (!) 93%   Breastfeeding No   BMI 32.28 kg/m²     "

## 2023-09-28 NOTE — ANESTHESIA PROCEDURE NOTES
Intubation    Date/Time: 9/28/2023 11:12 AM    Performed by: Carina Prasad CRNA  Authorized by: Ayden Alfaro MD    Intubation:     Induction:  Intravenous    Intubated:  Postinduction    Mask Ventilation:  Easy mask    Attempts:  1    Attempted By:  CRNA    Difficult Airway Encountered?: No      Complications:  None    Airway Device:  Supraglottic airway/LMA    Airway Device Size:  3.0    Style/Cuff Inflation:  Cuffed (inflated to minimal occlusive pressure)    Placement Verified By:  Capnometry    Complicating Factors:  None    Findings Post-Intubation:  BS equal bilateral and atraumatic/condition of teeth unchanged

## 2023-09-28 NOTE — OP NOTE
[facility] - Orthopedic Periop Services  Surgery Department  Operative Note    SUMMARY     Date of Procedure: 9/28/2023     Procedure: Procedure(s) (LRB):  RELEASE, CARPAL TUNNEL (Right)  EXCISION, GANGLION CYST, WRIST (Right)  RELEASE, HAND, FOR DEQUERVAIN'S TENOSYNOVITIS (Right)     Surgeon(s) and Role:     * Hong Rodriguez MD - Primary    Assisting Surgeon: None    Pre-Operative Diagnosis: Carpal tunnel syndrome of right wrist [G56.01]    Post-Operative Diagnosis: Post-Op Diagnosis Codes:     * Carpal tunnel syndrome of right wrist [G56.01]     * Ganglion cyst [M67.40]Dequervains    Anesthesia:  General    Technical Procedures Used:  1. Right carpal tunnel surgical release 2.  Excision cyst right wrist 3.  1st dorsal extensor compartment release for de Quervain syndrome-right wrist             DEPARTMENT OF ORTHOPEDIC SURGERY                OPERATIVE REPORT     NAME:  Marcell Dickson  MRN: [id number]               DATE OF SURGERY:  09/28/2023       PREOPERATIVE DIAGNOSIS:  right carpal tunnel syndrome    POSTOPERATIVE DIAGNOSIS:  right carpal tunnel syndrome    ANESTHESIA:  General    PROCEDURE:  rightcarpal tunnel surgical release, excision ganglion cyst, 1st dorsal extensor compartment release-right wrist 15 minute    PROCEDURE IN DETAIL:  The patient was taken to the operating room and placed in the supine position.  After an adequate level of regional poly block anesthesia had been achieved (SeeAnesthesia Note). The patients right upper extremity was scrubbed with Betadine and prepped in sterile fashion.  The operation was begun by making a linear skin incision from mid palm to the distal volar wrist crease.  The incision was carried carefully through the subcutaneous layers.  Now the transverse palmar fascia and volar carpal ligament were incised in line with the skin incision. A freer elevator was placed between the overlying fascia and underlying carpal canal contents.  Inspection  revealed flexor tenosynovitis.  Inspection of the median nerve revealed constrictions beneath the volar carpal ligament.  A careful external neurolysis was performed using Loupe magnification.  A limited tenosynovectomy was performed.  Attention was then turned to the dorsal radial aspect of the distal forearm/wrist.  Transverse incision was made just distal to the radial styloid.  He cyst within the 1st dorsal extensor compartment was identified.  The cyst was excised and submitted for pathologic evaluation.  First dorsal extensor compartment fascia was released with a scalpel and scissor.  The APB an EPL tendons were inspected and accessory slips released.  Skin margins were infiltrated with ½% Marcaine without Epinephrine.   The tourniquet was let down and hemostasis was achieved with the bipolar electrocautery.  The wound was irrigated copiously with antibiotic solution and the skin margins were reapproximated with running 5-0 Nylon suture.  The wound was dressed sterilely.  The patient was taken to the recovery room in satisfactory condition.  Estimated blood loss:  Minimal.  The patient received one gram of Ancef antibiotic intravenously prior to the procedure. Tourniquet time:  15 minutes    Hong Rodriguez           Significant Surgical Tasks Conducted by the Assistant(s), if Applicable:  None    Complications: No    Estimated Blood Loss (EBL): 5 mL           Implants: * No implants in log *    Specimens:   Specimen (24h ago, onward)       Start     Ordered    09/28/23 0669  Surgical Pathology  RELEASE UPON ORDERING         09/28/23 0479                            Condition: Good    Disposition: PACU - hemodynamically stable.    Attestation: I was present and scrubbed for the entire procedure.

## 2023-09-28 NOTE — SUBJECTIVE & OBJECTIVE
Past Medical History:   Diagnosis Date    Arthritis     GERD (gastroesophageal reflux disease)     History of rectal polyps 08/07/2018       Past Surgical History:   Procedure Laterality Date    breast reduction      CHOLECYSTECTOMY  10/28/2013    Dr. Paige    COLONOSCOPY  2018    gastric sleeve      HYSTERECTOMY  10/28/2013    Robotic, BSO,Cystoscopy, TOT-    OOPHORECTOMY      TUBAL LIGATION      tummy tuck         Review of patient's allergies indicates:   Allergen Reactions    Sulfa (sulfonamide antibiotics)        No current facility-administered medications for this encounter.     Family History       Problem Relation (Age of Onset)    Colon cancer Sister          Tobacco Use    Smoking status: Never     Passive exposure: Never    Smokeless tobacco: Never   Substance and Sexual Activity    Alcohol use: Yes     Comment: occasionally    Drug use: Never    Sexual activity: Yes     Review of Systems   Constitutional: Negative.     Objective:     Vital Signs (Most Recent):    Vital Signs (24h Range):              There is no height or weight on file to calculate BMI.    No intake or output data in the 24 hours ending 09/28/23 0810     General    Vitals reviewed.  Constitutional: She is oriented to person, place, and time. She appears well-developed and well-nourished.   HENT:   Head: Normocephalic and atraumatic.   Eyes: EOM are normal. Pupils are equal, round, and reactive to light.   Neck: Neck supple.   Cardiovascular:  Normal rate, regular rhythm and normal heart sounds.            Pulmonary/Chest: Effort normal and breath sounds normal.   Abdominal: Soft.   Neurological: She is alert and oriented to person, place, and time.   Psychiatric: Her behavior is normal. Thought content normal.             Right Hand/Wrist Exam     Tests   Phalens sign: positive  Tinel's sign (median nerve): positive  Carpal Tunnel Compression Test: positive  Flexor Digitorum Superficialis Test: normal  Flexor Digitorum  Profundus Test: normal      Other     Neuorologic Exam    Median Distribution: normal  Ulnar Distribution: normal  Radial Distribution: normal      Left Hand/Wrist Exam     Tests   Phalens sign: positive  Tinel's sign (median nerve): positive  Carpal Tunnel Compression Test: positive            Vascular Exam       Capillary Refill  Right Hand: normal capillary refill           Significant Labs: All pertinent labs within the past 24 hours have been reviewed.    Significant Imaging: I have reviewed all pertinent imaging results/findings.

## 2023-09-28 NOTE — BRIEF OP NOTE
[facility] - Orthopedic Periop Services  Brief Operative Note    Surgery Date: 9/28/2023     Surgeon(s) and Role:     * Hong Rodriguez MD - Primary    Assisting Surgeon: None    Pre-op Diagnosis:  Carpal tunnel syndrome of right wrist [G56.01]    Post-op Diagnosis:  Post-Op Diagnosis Codes:     * Carpal tunnel syndrome of right wrist [G56.01]     * Ganglion cyst [M67.40]    Procedure(s) (LRB):  RELEASE, CARPAL TUNNEL (Right)  EXCISION, GANGLION CYST, WRIST (Right)  RELEASE, HAND, FOR DEQUERVAIN'S TENOSYNOVITIS (Right)    Anesthesia: general    Description of the findings of the procedure(s): See Op Note     Estimated Blood Loss: 5 mL         Specimens:   Specimen (24h ago, onward)       Start     Ordered    09/28/23 1159  Surgical Pathology  RELEASE UPON ORDERING         09/28/23 1159                      Discharge Note    OUTCOME: Patient tolerated treatment/procedure well without complication and is now ready for discharge.    DISPOSITION: Home or Self Care    FINAL DIAGNOSIS:  Carpal tunnel syndrome of right wrist    FOLLOWUP: In clinic    DISCHARGE INSTRUCTIONS:    Discharge Procedure Orders   Diet general     Keep surgical extremity elevated     Ice to affected area   Order Comments: using barrier between ice and skin (specify duration&frequency)     Call MD for:  temperature >100.4     Call MD for:  persistent nausea and vomiting     Call MD for:  severe uncontrolled pain     Call MD for:  difficulty breathing, headache or visual disturbances     Call MD for:  redness, tenderness, or signs of infection (pain, swelling, redness, odor or green/yellow discharge around incision site)     Call MD for:  hives     Call MD for:  persistent dizziness or light-headedness     Call MD for:  extreme fatigue     Leave dressing on - Keep it clean, dry, and intact until clinic visit     Activity as tolerated     Weight bearing as tolerated

## 2023-09-28 NOTE — ANESTHESIA PREPROCEDURE EVALUATION
09/28/2023  Marcell SimonanielEliceo is a 53 y.o., female.      Pre-op Assessment    I have reviewed the Patient Summary Reports.    I have reviewed the NPO Status.   I have reviewed the Medications.     Review of Systems         Anesthesia Plan  Type of Anesthesia, risks & benefits discussed:    Anesthesia Type: Regional  Intra-op Monitoring Plan: Standard ASA Monitors  Post Op Pain Control Plan: multimodal analgesia  Informed Consent: Informed consent signed with the Patient and all parties understand the risks and agree with anesthesia plan.  All questions answered.   ASA Score: 1    Ready For Surgery From Anesthesia Perspective.     .  NKDA    GERD  Arthritis    Airway exam deferred (COVID precautions); adequate ROM at neck.    Plan is [place] block

## 2023-09-29 LAB
ESTROGEN SERPL-MCNC: NORMAL PG/ML
INSULIN SERPL-ACNC: NORMAL U[IU]/ML
LAB AP GROSS DESCRIPTION: NORMAL
LAB AP LABORATORY NOTES: NORMAL
T3RU NFR SERPL: NORMAL %

## 2023-09-29 NOTE — ANESTHESIA POSTPROCEDURE EVALUATION
Anesthesia Post Evaluation    Patient: Marcell SimonMitali    Procedure(s) Performed: Procedure(s) (LRB):  RELEASE, CARPAL TUNNEL (Right)  EXCISION, GANGLION CYST, WRIST (Right)  RELEASE, HAND, FOR DEQUERVAIN'S TENOSYNOVITIS (Right)    Final Anesthesia Type: general      Patient location during evaluation: PACU  Post-procedure vital signs: reviewed and stable  Pain management: adequate  Airway patency: patent    PONV status at discharge: No PONV  Anesthetic complications: no      Cardiovascular status: hemodynamically stable  Respiratory status: unassisted  Hydration status: euvolemic  Follow-up not needed.          Vitals Value Taken Time   /88 09/28/23 1346   Temp 36.7 °C (98 °F) 09/28/23 1214   Pulse 66 09/28/23 1352   Resp 16 09/28/23 1345   SpO2 99 % 09/28/23 1352   Vitals shown include unvalidated device data.      Event Time   Out of Recovery 12:41:00         Pain/Nik Score: Nik Score: 10 (9/28/2023  2:00 PM)  Modified Nik Score: 20 (9/28/2023  2:00 PM)

## 2023-10-11 ENCOUNTER — OFFICE VISIT (OUTPATIENT)
Dept: ORTHOPEDICS | Facility: CLINIC | Age: 53
End: 2023-10-11
Payer: COMMERCIAL

## 2023-10-11 VITALS — WEIGHT: 200 LBS | BODY MASS INDEX: 32.14 KG/M2 | OXYGEN SATURATION: 98 % | RESPIRATION RATE: 16 BRPM | HEIGHT: 66 IN

## 2023-10-11 DIAGNOSIS — Z98.890 S/P CARPAL TUNNEL RELEASE: Primary | ICD-10-CM

## 2023-10-11 DIAGNOSIS — Z98.890 S/P EXCISION OF GANGLION CYST: ICD-10-CM

## 2023-10-11 DIAGNOSIS — M65.4 DE QUERVAIN'S TENOSYNOVITIS, RIGHT: ICD-10-CM

## 2023-10-11 PROCEDURE — 99024 POSTOP FOLLOW-UP VISIT: CPT | Mod: ,,, | Performed by: NURSE PRACTITIONER

## 2023-10-11 PROCEDURE — 3044F PR MOST RECENT HEMOGLOBIN A1C LEVEL <7.0%: ICD-10-PCS | Mod: ,,, | Performed by: NURSE PRACTITIONER

## 2023-10-11 PROCEDURE — 1159F MED LIST DOCD IN RCRD: CPT | Mod: ,,, | Performed by: NURSE PRACTITIONER

## 2023-10-11 PROCEDURE — 3066F NEPHROPATHY DOC TX: CPT | Mod: ,,, | Performed by: NURSE PRACTITIONER

## 2023-10-11 PROCEDURE — 1160F PR REVIEW ALL MEDS BY PRESCRIBER/CLIN PHARMACIST DOCUMENTED: ICD-10-PCS | Mod: ,,, | Performed by: NURSE PRACTITIONER

## 2023-10-11 PROCEDURE — 1159F PR MEDICATION LIST DOCUMENTED IN MEDICAL RECORD: ICD-10-PCS | Mod: ,,, | Performed by: NURSE PRACTITIONER

## 2023-10-11 PROCEDURE — 3061F NEG MICROALBUMINURIA REV: CPT | Mod: ,,, | Performed by: NURSE PRACTITIONER

## 2023-10-11 PROCEDURE — 1160F RVW MEDS BY RX/DR IN RCRD: CPT | Mod: ,,, | Performed by: NURSE PRACTITIONER

## 2023-10-11 PROCEDURE — 99024 PR POST-OP FOLLOW-UP VISIT: ICD-10-PCS | Mod: ,,, | Performed by: NURSE PRACTITIONER

## 2023-10-11 PROCEDURE — 3066F PR DOCUMENTATION OF TREATMENT FOR NEPHROPATHY: ICD-10-PCS | Mod: ,,, | Performed by: NURSE PRACTITIONER

## 2023-10-11 PROCEDURE — 3044F HG A1C LEVEL LT 7.0%: CPT | Mod: ,,, | Performed by: NURSE PRACTITIONER

## 2023-10-11 PROCEDURE — 3061F PR NEG MICROALBUMINURIA RESULT DOCUMENTED/REVIEW: ICD-10-PCS | Mod: ,,, | Performed by: NURSE PRACTITIONER

## 2023-10-11 PROCEDURE — 99213 OFFICE O/P EST LOW 20 MIN: CPT | Mod: PBBFAC | Performed by: NURSE PRACTITIONER

## 2023-10-11 NOTE — PATIENT INSTRUCTIONS
Safety guidelines and activity restrictions are discussed with the patient.  Pathology report was reviewed.  Findings of ganglion cyst were reported to the patient.  Sutures removed Steri-Strips applied.  May get her hands wet to wash her face or hands.  Do not immerse her hands in any water.  In 10 days begin vitamin-E oil massages over both surgical sites.  Instructed on gentle range-of-motion exercises to perform on her hands.  We will have return to orthopedic clinic in a proximally 2 weeks with Dr. Rodriguez for re-evaluation range of motion.  If not improved at that time we will send her for occupational therapy.

## 2023-10-11 NOTE — PROGRESS NOTES
53-year-old female presents ambulatory orthopedic clinic valuation of her right hand.  She is known to orthopedic clinic having undergone carpal tunnel release, release for de Quervain tenosynovitis and ganglion cyst excision on 09/28/2023 of the right hand.  She reports resolution of pain symptoms.    PE:  Physical exam she is noted have surgical dressing in place.  Surgical dressing removed.  Surgical sites healing well without sign or symptom of infection.  Sutures are noted.  Right radial pulse 2/4.  Capillary refill digits right hand less than 3 seconds.  She does have some stiffness in the MC in IP joints.    Impression:  1.)  History of carpal tunnel release, release for de Quervain tenosynovitis and ganglion cyst excision right upper extremity 09/28/2023     Plan:  Safety guidelines and activity restrictions are discussed with the patient.  Pathology report was reviewed.  Findings of ganglion cyst were reported to the patient.  Sutures removed Steri-Strips applied.  May get her hands wet to wash her face or hands.  Do not immerse her hands in any water.  In 10 days begin vitamin-E oil massages over both surgical sites.  Instructed on gentle range-of-motion exercises to perform on her hands.  We will have return to orthopedic clinic in a proximally 2 weeks with Dr. Rodriguez for re-evaluation range of motion.  If not improved at that time we will send her for occupational therapy.

## 2023-10-16 ENCOUNTER — OFFICE VISIT (OUTPATIENT)
Dept: FAMILY MEDICINE | Facility: CLINIC | Age: 53
End: 2023-10-16
Payer: COMMERCIAL

## 2023-10-16 VITALS
HEIGHT: 66 IN | BODY MASS INDEX: 40.32 KG/M2 | RESPIRATION RATE: 18 BRPM | SYSTOLIC BLOOD PRESSURE: 133 MMHG | TEMPERATURE: 98 F | WEIGHT: 250.88 LBS | OXYGEN SATURATION: 96 % | HEART RATE: 70 BPM | DIASTOLIC BLOOD PRESSURE: 87 MMHG

## 2023-10-16 DIAGNOSIS — G47.09 OTHER INSOMNIA: Chronic | ICD-10-CM

## 2023-10-16 DIAGNOSIS — K59.09 CHRONIC CONSTIPATION: Chronic | ICD-10-CM

## 2023-10-16 DIAGNOSIS — E87.6 LOW SERUM POTASSIUM: ICD-10-CM

## 2023-10-16 DIAGNOSIS — I10 PRIMARY HYPERTENSION: Primary | ICD-10-CM

## 2023-10-16 DIAGNOSIS — Z23 IMMUNIZATION DUE: ICD-10-CM

## 2023-10-16 DIAGNOSIS — K21.9 GASTROESOPHAGEAL REFLUX DISEASE WITHOUT ESOPHAGITIS: Chronic | ICD-10-CM

## 2023-10-16 DIAGNOSIS — E66.01 CLASS 3 SEVERE OBESITY DUE TO EXCESS CALORIES WITH SERIOUS COMORBIDITY AND BODY MASS INDEX (BMI) OF 40.0 TO 44.9 IN ADULT: ICD-10-CM

## 2023-10-16 DIAGNOSIS — M19.90 ARTHRITIS: Chronic | ICD-10-CM

## 2023-10-16 DIAGNOSIS — J30.89 SEASONAL ALLERGIC RHINITIS DUE TO OTHER ALLERGIC TRIGGER: Chronic | ICD-10-CM

## 2023-10-16 LAB
ALBUMIN SERPL BCP-MCNC: 3.6 G/DL (ref 3.5–5)
ALBUMIN/GLOB SERPL: 0.9 {RATIO}
ALP SERPL-CCNC: 136 U/L (ref 46–118)
ALT SERPL W P-5'-P-CCNC: 26 U/L (ref 13–56)
ANION GAP SERPL CALCULATED.3IONS-SCNC: 8 MMOL/L (ref 7–16)
AST SERPL W P-5'-P-CCNC: 21 U/L (ref 15–37)
BASOPHILS # BLD AUTO: 0.03 K/UL (ref 0–0.2)
BASOPHILS NFR BLD AUTO: 0.5 % (ref 0–1)
BILIRUB SERPL-MCNC: 0.4 MG/DL (ref ?–1.2)
BUN SERPL-MCNC: 16 MG/DL (ref 7–18)
BUN/CREAT SERPL: 15 (ref 6–20)
CALCIUM SERPL-MCNC: 9.3 MG/DL (ref 8.5–10.1)
CHLORIDE SERPL-SCNC: 103 MMOL/L (ref 98–107)
CHOLEST SERPL-MCNC: 139 MG/DL (ref 0–200)
CHOLEST/HDLC SERPL: 1.7 {RATIO}
CO2 SERPL-SCNC: 31 MMOL/L (ref 21–32)
CREAT SERPL-MCNC: 1.09 MG/DL (ref 0.55–1.02)
CREAT UR-MCNC: 31 MG/DL (ref 28–219)
DIFFERENTIAL METHOD BLD: ABNORMAL
EGFR (NO RACE VARIABLE) (RUSH/TITUS): 60 ML/MIN/1.73M2
EOSINOPHIL # BLD AUTO: 0.18 K/UL (ref 0–0.5)
EOSINOPHIL NFR BLD AUTO: 3 % (ref 1–4)
ERYTHROCYTE [DISTWIDTH] IN BLOOD BY AUTOMATED COUNT: 13.7 % (ref 11.5–14.5)
GLOBULIN SER-MCNC: 4.1 G/DL (ref 2–4)
GLUCOSE SERPL-MCNC: 74 MG/DL (ref 74–106)
HCT VFR BLD AUTO: 38.6 % (ref 38–47)
HDLC SERPL-MCNC: 81 MG/DL (ref 40–60)
HGB BLD-MCNC: 11.9 G/DL (ref 12–16)
IMM GRANULOCYTES # BLD AUTO: 0.02 K/UL (ref 0–0.04)
IMM GRANULOCYTES NFR BLD: 0.3 % (ref 0–0.4)
LDLC SERPL CALC-MCNC: 44 MG/DL
LYMPHOCYTES # BLD AUTO: 2.03 K/UL (ref 1–4.8)
LYMPHOCYTES NFR BLD AUTO: 34 % (ref 27–41)
MCH RBC QN AUTO: 29.6 PG (ref 27–31)
MCHC RBC AUTO-ENTMCNC: 30.8 G/DL (ref 32–36)
MCV RBC AUTO: 96 FL (ref 80–96)
MICROALBUMIN UR-MCNC: <0.5 MG/DL (ref 0–2.8)
MICROALBUMIN/CREAT RATIO PNL UR: <16.1 MG/G (ref 0–30)
MONOCYTES # BLD AUTO: 0.5 K/UL (ref 0–0.8)
MONOCYTES NFR BLD AUTO: 8.4 % (ref 2–6)
MPC BLD CALC-MCNC: 12.7 FL (ref 9.4–12.4)
NEUTROPHILS # BLD AUTO: 3.21 K/UL (ref 1.8–7.7)
NEUTROPHILS NFR BLD AUTO: 53.8 % (ref 53–65)
NONHDLC SERPL-MCNC: 58 MG/DL
NRBC # BLD AUTO: 0 X10E3/UL
NRBC, AUTO (.00): 0 %
PLATELET # BLD AUTO: 376 K/UL (ref 150–400)
POTASSIUM SERPL-SCNC: 3.4 MMOL/L (ref 3.5–5.1)
PROT SERPL-MCNC: 7.7 G/DL (ref 6.4–8.2)
RBC # BLD AUTO: 4.02 M/UL (ref 4.2–5.4)
SODIUM SERPL-SCNC: 139 MMOL/L (ref 136–145)
TRIGL SERPL-MCNC: 70 MG/DL (ref 35–150)
VLDLC SERPL-MCNC: 14 MG/DL
WBC # BLD AUTO: 5.97 K/UL (ref 4.5–11)

## 2023-10-16 PROCEDURE — 85025 CBC WITH DIFFERENTIAL: ICD-10-PCS | Mod: ,,, | Performed by: CLINICAL MEDICAL LABORATORY

## 2023-10-16 PROCEDURE — 90471 IMMUNIZATION ADMIN: CPT | Mod: ,,, | Performed by: FAMILY MEDICINE

## 2023-10-16 PROCEDURE — 1159F PR MEDICATION LIST DOCUMENTED IN MEDICAL RECORD: ICD-10-PCS | Mod: ,,, | Performed by: FAMILY MEDICINE

## 2023-10-16 PROCEDURE — 3075F PR MOST RECENT SYSTOLIC BLOOD PRESS GE 130-139MM HG: ICD-10-PCS | Mod: ,,, | Performed by: FAMILY MEDICINE

## 2023-10-16 PROCEDURE — 80053 COMPREHEN METABOLIC PANEL: CPT | Mod: ,,, | Performed by: CLINICAL MEDICAL LABORATORY

## 2023-10-16 PROCEDURE — 82043 MICROALBUMIN / CREATININE RATIO URINE: ICD-10-PCS | Mod: ,,, | Performed by: CLINICAL MEDICAL LABORATORY

## 2023-10-16 PROCEDURE — 80061 LIPID PANEL: ICD-10-PCS | Mod: ,,, | Performed by: CLINICAL MEDICAL LABORATORY

## 2023-10-16 PROCEDURE — 3008F BODY MASS INDEX DOCD: CPT | Mod: ,,, | Performed by: FAMILY MEDICINE

## 2023-10-16 PROCEDURE — 82043 UR ALBUMIN QUANTITATIVE: CPT | Mod: ,,, | Performed by: CLINICAL MEDICAL LABORATORY

## 2023-10-16 PROCEDURE — 99214 PR OFFICE/OUTPT VISIT, EST, LEVL IV, 30-39 MIN: ICD-10-PCS | Mod: 25,,, | Performed by: FAMILY MEDICINE

## 2023-10-16 PROCEDURE — 80061 LIPID PANEL: CPT | Mod: ,,, | Performed by: CLINICAL MEDICAL LABORATORY

## 2023-10-16 PROCEDURE — 3061F PR NEG MICROALBUMINURIA RESULT DOCUMENTED/REVIEW: ICD-10-PCS | Mod: ,,, | Performed by: FAMILY MEDICINE

## 2023-10-16 PROCEDURE — 3066F NEPHROPATHY DOC TX: CPT | Mod: ,,, | Performed by: FAMILY MEDICINE

## 2023-10-16 PROCEDURE — 1159F MED LIST DOCD IN RCRD: CPT | Mod: ,,, | Performed by: FAMILY MEDICINE

## 2023-10-16 PROCEDURE — 3061F NEG MICROALBUMINURIA REV: CPT | Mod: ,,, | Performed by: FAMILY MEDICINE

## 2023-10-16 PROCEDURE — 90686 IIV4 VACC NO PRSV 0.5 ML IM: CPT | Mod: ,,, | Performed by: FAMILY MEDICINE

## 2023-10-16 PROCEDURE — 1160F RVW MEDS BY RX/DR IN RCRD: CPT | Mod: ,,, | Performed by: FAMILY MEDICINE

## 2023-10-16 PROCEDURE — 80053 COMPREHENSIVE METABOLIC PANEL: ICD-10-PCS | Mod: ,,, | Performed by: CLINICAL MEDICAL LABORATORY

## 2023-10-16 PROCEDURE — 3079F PR MOST RECENT DIASTOLIC BLOOD PRESSURE 80-89 MM HG: ICD-10-PCS | Mod: ,,, | Performed by: FAMILY MEDICINE

## 2023-10-16 PROCEDURE — 3044F PR MOST RECENT HEMOGLOBIN A1C LEVEL <7.0%: ICD-10-PCS | Mod: ,,, | Performed by: FAMILY MEDICINE

## 2023-10-16 PROCEDURE — 85025 COMPLETE CBC W/AUTO DIFF WBC: CPT | Mod: ,,, | Performed by: CLINICAL MEDICAL LABORATORY

## 2023-10-16 PROCEDURE — 3075F SYST BP GE 130 - 139MM HG: CPT | Mod: ,,, | Performed by: FAMILY MEDICINE

## 2023-10-16 PROCEDURE — 3008F PR BODY MASS INDEX (BMI) DOCUMENTED: ICD-10-PCS | Mod: ,,, | Performed by: FAMILY MEDICINE

## 2023-10-16 PROCEDURE — 90471 FLU VACCINE (QUAD) GREATER THAN OR EQUAL TO 3YO PRESERVATIVE FREE IM: ICD-10-PCS | Mod: ,,, | Performed by: FAMILY MEDICINE

## 2023-10-16 PROCEDURE — 82570 ASSAY OF URINE CREATININE: CPT | Mod: ,,, | Performed by: CLINICAL MEDICAL LABORATORY

## 2023-10-16 PROCEDURE — 3079F DIAST BP 80-89 MM HG: CPT | Mod: ,,, | Performed by: FAMILY MEDICINE

## 2023-10-16 PROCEDURE — 1160F PR REVIEW ALL MEDS BY PRESCRIBER/CLIN PHARMACIST DOCUMENTED: ICD-10-PCS | Mod: ,,, | Performed by: FAMILY MEDICINE

## 2023-10-16 PROCEDURE — 82570 MICROALBUMIN / CREATININE RATIO URINE: ICD-10-PCS | Mod: ,,, | Performed by: CLINICAL MEDICAL LABORATORY

## 2023-10-16 PROCEDURE — 90686 FLU VACCINE (QUAD) GREATER THAN OR EQUAL TO 3YO PRESERVATIVE FREE IM: ICD-10-PCS | Mod: ,,, | Performed by: FAMILY MEDICINE

## 2023-10-16 PROCEDURE — 3044F HG A1C LEVEL LT 7.0%: CPT | Mod: ,,, | Performed by: FAMILY MEDICINE

## 2023-10-16 PROCEDURE — 99214 OFFICE O/P EST MOD 30 MIN: CPT | Mod: 25,,, | Performed by: FAMILY MEDICINE

## 2023-10-16 PROCEDURE — 3066F PR DOCUMENTATION OF TREATMENT FOR NEPHROPATHY: ICD-10-PCS | Mod: ,,, | Performed by: FAMILY MEDICINE

## 2023-10-16 RX ORDER — TRAZODONE HYDROCHLORIDE 100 MG/1
100 TABLET ORAL NIGHTLY
Qty: 90 TABLET | Refills: 1 | Status: SHIPPED | OUTPATIENT
Start: 2023-10-16 | End: 2024-02-20 | Stop reason: SDUPTHER

## 2023-10-16 NOTE — PROGRESS NOTES
Clinic Note    Patient Name: Marcell Dickson  : 1970  MRN: [id number]    Chief Complaint   Patient presents with    Follow-up     4 month       HPI:    Ms. Marcell Dickson is a 53 y.o. female who presents to clinic today with CC of follow up on chronic disease processes including HTN, obesity, GERD, chronic constipation, insomnia, OA, and chronic back pain.  BP is elevated today. Patient reports medication compliance. Reports BP has been well controlled.  Patient reports chronic issues are well controlled on current medication regimen.  Denies problems or side effects with medications.  Reports she had had orthopedic surgery on her R hand several weeks ago. Reports she is doing well from this surgery.  Patient is, otherwise, without complaints.     Medications:  Medication List with Changes/Refills   Current Medications    CETIRIZINE (ZYRTEC) 10 MG TABLET    Take 1 tablet (10 mg total) by mouth once daily.    FUROSEMIDE (LASIX) 20 MG TABLET    Take 1 tablet (20 mg total) by mouth once daily.    HYDROCHLOROTHIAZIDE (HYDRODIURIL) 25 MG TABLET    Take 1 tablet (25 mg total) by mouth once daily.    HYDROXYZINE PAMOATE (VISTARIL) 25 MG CAP    TAKE 1 CAPSULE(25 MG) BY MOUTH EVERY 8 HOURS AS NEEDED FOR ITCHING OR ANXIETY    LINACLOTIDE (LINZESS) 290 MCG CAP CAPSULE    Take 1 capsule (290 mcg total) by mouth before breakfast.    PANTOPRAZOLE (PROTONIX) 40 MG TABLET    TAKE 1 TABLET(40 MG) BY MOUTH EVERY DAY    PREGABALIN (LYRICA) 100 MG CAPSULE    Take 1 capsule (100 mg total) by mouth 2 (two) times daily.   Changed and/or Refilled Medications    Modified Medication Previous Medication    TRAZODONE (DESYREL) 100 MG TABLET traZODone (DESYREL) 100 MG tablet       Take 1 tablet (100 mg total) by mouth every evening.    Take 1 tablet (100 mg total) by mouth every evening.        Allergies: Sulfa (sulfonamide antibiotics)      Past Medical History:    Past Medical History:   Diagnosis Date     Arthritis     GERD (gastroesophageal reflux disease)     History of rectal polyps 08/07/2018       Past Surgical History:    Past Surgical History:   Procedure Laterality Date    breast reduction      CARPAL TUNNEL RELEASE Right 9/28/2023    Procedure: RELEASE, CARPAL TUNNEL;  Surgeon: Hong Rodriguez MD;  Location: [facility];  Service: Orthopedics;  Laterality: Right;    CHOLECYSTECTOMY  10/28/2013    Dr. Paige    COLONOSCOPY  2018    DE QUERVAIN'S RELEASE Right 9/28/2023    Procedure: RELEASE, HAND, FOR DEQUERVAIN'S TENOSYNOVITIS;  Surgeon: Hong Rodriguez MD;  Location: [facility] OR;  Service: Orthopedics;  Laterality: Right;    EXCISION OF GANGLION OF WRIST Right 9/28/2023    Procedure: EXCISION, GANGLION CYST, WRIST;  Surgeon: Hong Rodriguez MD;  Location: [facility] OR;  Service: Orthopedics;  Laterality: Right;    gastric sleeve      HYSTERECTOMY  10/28/2013    Robotic, BSO,Cystoscopy, TOT-    OOPHORECTOMY      TUBAL LIGATION      tummy tuck           Social History:    Social History     Tobacco Use   Smoking Status Never    Passive exposure: Never   Smokeless Tobacco Never     Social History     Substance and Sexual Activity   Alcohol Use Yes    Comment: occasionally     Social History     Substance and Sexual Activity   Drug Use Never         Family History:    Family History   Problem Relation Age of Onset    Colon cancer Sister        Review of Systems:    Review of Systems   Constitutional:  Negative for appetite change, chills, fatigue, fever and unexpected weight change.   Eyes:  Negative for visual disturbance.   Respiratory:  Negative for cough and shortness of breath.    Cardiovascular:  Negative for chest pain and leg swelling.   Gastrointestinal:  Positive for constipation. Negative for abdominal pain, blood in stool, change in bowel habit, diarrhea, nausea and vomiting.        + chronic constipation - improved with linzess   Musculoskeletal:  Positive  "for arthralgias and back pain.   Integumentary:  Negative for rash.   Neurological:  Negative for dizziness and headaches.   Psychiatric/Behavioral:  The patient is not nervous/anxious.         Vitals:    Vitals:    10/16/23 0803 10/16/23 0837   BP: (!) 149/86 133/87   BP Location: Left arm Right arm   Patient Position: Sitting Sitting   BP Method: Medium (Automatic) Medium (Automatic)   Pulse: 70    Resp: 18    Temp: 98.1 °F (36.7 °C)    TempSrc: Oral    SpO2: 96%    Weight: 113.8 kg (250 lb 14.4 oz)    Height: 5' 6" (1.676 m)        Body mass index is 40.5 kg/m².    Wt Readings from Last 3 Encounters:   10/16/23 0803 113.8 kg (250 lb 14.4 oz)   10/11/23 0822 90.7 kg (200 lb)   09/28/23 0932 90.7 kg (200 lb)        Physical Exam:    Physical Exam  Constitutional:       General: She is not in acute distress.     Appearance: Normal appearance. She is obese.   HENT:      Nose: Nose normal.      Mouth/Throat:      Mouth: Mucous membranes are moist.      Pharynx: Oropharynx is clear.   Eyes:      Conjunctiva/sclera: Conjunctivae normal.   Cardiovascular:      Rate and Rhythm: Normal rate and regular rhythm.      Heart sounds: Normal heart sounds. No murmur heard.  Pulmonary:      Effort: Pulmonary effort is normal. No respiratory distress.      Breath sounds: Normal breath sounds. No wheezing, rhonchi or rales.   Abdominal:      General: Bowel sounds are normal.      Palpations: Abdomen is soft.      Tenderness: There is no abdominal tenderness.   Musculoskeletal:      Cervical back: Neck supple.      Right lower leg: No edema.      Left lower leg: No edema.   Skin:     Findings: No rash.   Neurological:      General: No focal deficit present.      Mental Status: She is alert. Mental status is at baseline.   Psychiatric:         Mood and Affect: Mood normal.           Assessment/Plan:   1. Primary hypertension  -     CBC Auto Differential; Future; Expected date: 10/16/2023  -     Comprehensive Metabolic Panel; Future; " Expected date: 10/16/2023  -     Lipid Panel; Future; Expected date: 10/16/2023  -     Microalbumin/Creatinine Ratio, Urine    2. Other insomnia  -     traZODone (DESYREL) 100 MG tablet; Take 1 tablet (100 mg total) by mouth every evening.  Dispense: 90 tablet; Refill: 1    3. Seasonal allergic rhinitis due to other allergic trigger  The current medical regimen is effective;  continue present plan and medications.    4. Class 3 severe obesity due to excess calories with serious comorbidity and body mass index (BMI) of 40.0 to 44.9 in adult  - BMI discussed with patient.  - Diet and exercise  - Diet discussed and educational information provided  - Recommend 30 minutes of exercise at least 5 days per week.    5. Gastroesophageal reflux disease without esophagitis  The current medical regimen is effective;  continue present plan and medications.    6. Chronic constipation  The current medical regimen is effective;  continue present plan and medications.    7. Arthritis  The current medical regimen is effective;  continue present plan and medications.    8. Immunization due  -     Influenza - Quadrivalent *Preferred* (6 months+) (PF)         Active Problem List with Overview Notes    Diagnosis Date Noted    Primary hypertension 06/28/2022    Osteoarthrosis multiple sites, not specified as generalized 06/22/2023    Other insomnia 11/03/2022    Class 2 obesity due to excess calories without serious comorbidity with body mass index (BMI) of 36.0 to 36.9 in adult 11/03/2022    Arthritis 06/28/2022    Cervical radiculopathy 07/13/2023    Lumbosacral radiculopathy 06/22/2023    Bulging lumbar disc 05/03/2023    Gastroesophageal reflux disease without esophagitis 11/03/2022    Chronic constipation 06/28/2022    Hiatal hernia 05/03/2023    Seasonal allergic rhinitis 06/28/2022    S/P excision of ganglion cyst 10/11/2023    De Quervain's tenosynovitis, right 09/27/2023    Carpal tunnel syndrome of right wrist 09/21/2023     Colon cancer screening 08/28/2023        Health Maintenance:  Health Maintenance   Topic Date Due    TETANUS VACCINE  Never done    Mammogram  03/03/2024    Lipid Panel  03/28/2028    Colorectal Cancer Screening  08/28/2028    Hepatitis C Screening  Completed    Shingles Vaccine  Completed       RTC in 4 months for chronic follow up.  RTC sooner if needed.   Patient voiced understanding and is agreeable to plan.      Aileen Rodriguez MD    Family Medicine

## 2023-10-18 RX ORDER — POTASSIUM CHLORIDE 750 MG/1
10 CAPSULE, EXTENDED RELEASE ORAL 2 TIMES DAILY
Qty: 60 CAPSULE | Refills: 2 | Status: SHIPPED | OUTPATIENT
Start: 2023-10-18 | End: 2023-11-25

## 2023-10-18 NOTE — TELEPHONE ENCOUNTER
----- Message from Arlet Rodriguez MD sent at 10/17/2023  4:38 PM CDT -----  Please call patient regarding lab results. Potassium is mildly low. Recommend potassium 10 meq supplementation PO BID. Creatinine is mildly elevated. Avoid NSAIDs. Drink an adequate amount of water. Labs, otherwise, ok/stable. Thanks!    1104- call made to pt regarding above message. Pt voiced understanding and states she started back taking her potassium last night that were leftover from the last time. Advised pt to not do that as they may have been  and wrong dosage. Pt states she is not at home right now but knows she has two big bottles full of the med and will take them so she is not waisting meds/ money on a new rx. Encouraged pt again to not take those at home and take the new rx as the old dosage in computer system states 20meq and pt has been newly rx 10 meq. Pt states Okay and thanked me for the call back. Will send in new rx for potassium.

## 2023-10-25 ENCOUNTER — OFFICE VISIT (OUTPATIENT)
Dept: ORTHOPEDICS | Facility: CLINIC | Age: 53
End: 2023-10-25
Payer: COMMERCIAL

## 2023-10-25 DIAGNOSIS — Z09 POSTOP CHECK: Primary | ICD-10-CM

## 2023-10-25 PROCEDURE — 3044F PR MOST RECENT HEMOGLOBIN A1C LEVEL <7.0%: ICD-10-PCS | Mod: ,,, | Performed by: ORTHOPAEDIC SURGERY

## 2023-10-25 PROCEDURE — 3066F NEPHROPATHY DOC TX: CPT | Mod: ,,, | Performed by: ORTHOPAEDIC SURGERY

## 2023-10-25 PROCEDURE — 3061F NEG MICROALBUMINURIA REV: CPT | Mod: ,,, | Performed by: ORTHOPAEDIC SURGERY

## 2023-10-25 PROCEDURE — 1159F MED LIST DOCD IN RCRD: CPT | Mod: ,,, | Performed by: ORTHOPAEDIC SURGERY

## 2023-10-25 PROCEDURE — 99024 POSTOP FOLLOW-UP VISIT: CPT | Mod: ,,, | Performed by: ORTHOPAEDIC SURGERY

## 2023-10-25 PROCEDURE — 1160F RVW MEDS BY RX/DR IN RCRD: CPT | Mod: ,,, | Performed by: ORTHOPAEDIC SURGERY

## 2023-10-25 PROCEDURE — 1159F PR MEDICATION LIST DOCUMENTED IN MEDICAL RECORD: ICD-10-PCS | Mod: ,,, | Performed by: ORTHOPAEDIC SURGERY

## 2023-10-25 PROCEDURE — 99213 OFFICE O/P EST LOW 20 MIN: CPT | Mod: PBBFAC | Performed by: ORTHOPAEDIC SURGERY

## 2023-10-25 PROCEDURE — 3066F PR DOCUMENTATION OF TREATMENT FOR NEPHROPATHY: ICD-10-PCS | Mod: ,,, | Performed by: ORTHOPAEDIC SURGERY

## 2023-10-25 PROCEDURE — 3061F PR NEG MICROALBUMINURIA RESULT DOCUMENTED/REVIEW: ICD-10-PCS | Mod: ,,, | Performed by: ORTHOPAEDIC SURGERY

## 2023-10-25 PROCEDURE — 3044F HG A1C LEVEL LT 7.0%: CPT | Mod: ,,, | Performed by: ORTHOPAEDIC SURGERY

## 2023-10-25 PROCEDURE — 1160F PR REVIEW ALL MEDS BY PRESCRIBER/CLIN PHARMACIST DOCUMENTED: ICD-10-PCS | Mod: ,,, | Performed by: ORTHOPAEDIC SURGERY

## 2023-10-25 PROCEDURE — 99024 PR POST-OP FOLLOW-UP VISIT: ICD-10-PCS | Mod: ,,, | Performed by: ORTHOPAEDIC SURGERY

## 2023-10-25 NOTE — PROGRESS NOTES
CLINIC NOTE       Chief Complaint   Patient presents with    Right Hand - Follow-up        Marcell Dickson is a 53 y.o. female seen today for recheck of both wrist.  She underwent right carpal tunnel surgical release with de Quervain 1st dorsal extensor compartment release and ganglion cyst excision 4 and half weeks ago.  Additionally she had a carpal tunnel steroid injection on the left side.  She was doing well with both hands at this time.  Right hand incisions are all healing well without signs of infection.  Slow progression of activities outlined.  Recheck if left carpal tunnel symptoms recur.      Past Medical History:   Diagnosis Date    Arthritis     GERD (gastroesophageal reflux disease)     History of rectal polyps 08/07/2018     Family History   Problem Relation Age of Onset    Colon cancer Sister      Current Outpatient Medications on File Prior to Visit   Medication Sig Dispense Refill    cetirizine (ZYRTEC) 10 MG tablet Take 1 tablet (10 mg total) by mouth once daily. 90 tablet 1    furosemide (LASIX) 20 MG tablet Take 1 tablet (20 mg total) by mouth once daily. 90 tablet 1    hydroCHLOROthiazide (HYDRODIURIL) 25 MG tablet Take 1 tablet (25 mg total) by mouth once daily. 90 tablet 1    hydrOXYzine pamoate (VISTARIL) 25 MG Cap TAKE 1 CAPSULE(25 MG) BY MOUTH EVERY 8 HOURS AS NEEDED FOR ITCHING OR ANXIETY 45 capsule 2    linaCLOtide (LINZESS) 290 mcg Cap capsule Take 1 capsule (290 mcg total) by mouth before breakfast. 90 capsule 1    pantoprazole (PROTONIX) 40 MG tablet TAKE 1 TABLET(40 MG) BY MOUTH EVERY DAY 90 tablet 1    potassium chloride (MICRO-K) 10 MEQ CpSR Take 1 capsule (10 mEq total) by mouth 2 (two) times daily. 60 capsule 2    pregabalin (LYRICA) 100 MG capsule Take 1 capsule (100 mg total) by mouth 2 (two) times daily. 60 capsule 2    traZODone (DESYREL) 100 MG tablet Take 1 tablet (100 mg total) by mouth every evening. 90 tablet 1     No current facility-administered  medications on file prior to visit.       ROS     There were no vitals filed for this visit.    Past Surgical History:   Procedure Laterality Date    breast reduction      CARPAL TUNNEL RELEASE Right 9/28/2023    Procedure: RELEASE, CARPAL TUNNEL;  Surgeon: Hong Rodriguez MD;  Location: [facility] OR;  Service: Orthopedics;  Laterality: Right;    CHOLECYSTECTOMY  10/28/2013    Dr. Paige    COLONOSCOPY  2018    DE QUERVAIN'S RELEASE Right 9/28/2023    Procedure: RELEASE, HAND, FOR DEQUERVAIN'S TENOSYNOVITIS;  Surgeon: Hong Rodriguez MD;  Location: [facility] OR;  Service: Orthopedics;  Laterality: Right;    EXCISION OF GANGLION OF WRIST Right 9/28/2023    Procedure: EXCISION, GANGLION CYST, WRIST;  Surgeon: Hong Rodriguez MD;  Location: [facility] OR;  Service: Orthopedics;  Laterality: Right;    gastric sleeve      HYSTERECTOMY  10/28/2013    Robotic, BSO,Cystoscopy, TOT-    OOPHORECTOMY      TUBAL LIGATION      tummy tu          Review of patient's allergies indicates:   Allergen Reactions    Sulfa (sulfonamide antibiotics)         Ortho Exam     Radiographic Examination:    Technique:    Findings:    Impression:   See Above    Assessment and Plan  Patient Active Problem List    Diagnosis Date Noted    S/P excision of ganglion cyst 10/11/2023    De Quervain's tenosynovitis, right 09/27/2023    Carpal tunnel syndrome of right wrist 09/21/2023    Colon cancer screening 08/28/2023    Cervical radiculopathy 07/13/2023    Lumbosacral radiculopathy 06/22/2023    Osteoarthrosis multiple sites, not specified as generalized 06/22/2023    Bulging lumbar disc 05/03/2023    Hiatal hernia 05/03/2023    Other insomnia 11/03/2022    Gastroesophageal reflux disease without esophagitis 11/03/2022    Class 2 obesity due to excess calories without serious comorbidity with body mass index (BMI) of 36.0 to 36.9 in adult 11/03/2022    Seasonal allergic rhinitis 06/28/2022    Primary hypertension  06/28/2022    Arthritis 06/28/2022    Chronic constipation 06/28/2022          Hong Rodriguez M.D.

## 2023-10-30 DIAGNOSIS — J30.89 SEASONAL ALLERGIC RHINITIS DUE TO OTHER ALLERGIC TRIGGER: Chronic | ICD-10-CM

## 2023-10-30 RX ORDER — CETIRIZINE HYDROCHLORIDE 10 MG/1
10 TABLET ORAL
Qty: 90 TABLET | Refills: 1 | Status: SHIPPED | OUTPATIENT
Start: 2023-10-30 | End: 2024-02-20 | Stop reason: SDUPTHER

## 2023-11-13 ENCOUNTER — OFFICE VISIT (OUTPATIENT)
Dept: FAMILY MEDICINE | Facility: CLINIC | Age: 53
End: 2023-11-13
Payer: COMMERCIAL

## 2023-11-13 VITALS
HEIGHT: 66 IN | WEIGHT: 252.19 LBS | DIASTOLIC BLOOD PRESSURE: 65 MMHG | SYSTOLIC BLOOD PRESSURE: 94 MMHG | TEMPERATURE: 98 F | BODY MASS INDEX: 40.53 KG/M2 | OXYGEN SATURATION: 95 % | RESPIRATION RATE: 18 BRPM | HEART RATE: 82 BPM

## 2023-11-13 DIAGNOSIS — R05.9 COUGH, UNSPECIFIED TYPE: ICD-10-CM

## 2023-11-13 DIAGNOSIS — R68.83 CHILLS: ICD-10-CM

## 2023-11-13 DIAGNOSIS — J06.9 UPPER RESPIRATORY TRACT INFECTION, UNSPECIFIED TYPE: Primary | ICD-10-CM

## 2023-11-13 DIAGNOSIS — R52 GENERALIZED BODY ACHES: ICD-10-CM

## 2023-11-13 LAB
CTP QC/QA: YES
CTP QC/QA: YES
FLUAV AG NPH QL: NEGATIVE
FLUBV AG NPH QL: NEGATIVE
SARS-COV-2 AG RESP QL IA.RAPID: NEGATIVE

## 2023-11-13 PROCEDURE — 3078F DIAST BP <80 MM HG: CPT | Mod: ,,, | Performed by: NURSE PRACTITIONER

## 2023-11-13 PROCEDURE — 3061F NEG MICROALBUMINURIA REV: CPT | Mod: ,,, | Performed by: NURSE PRACTITIONER

## 2023-11-13 PROCEDURE — 87426 SARS CORONAVIRUS 2 ANTIGEN POCT: ICD-10-PCS | Mod: QW,,, | Performed by: NURSE PRACTITIONER

## 2023-11-13 PROCEDURE — 3066F PR DOCUMENTATION OF TREATMENT FOR NEPHROPATHY: ICD-10-PCS | Mod: ,,, | Performed by: NURSE PRACTITIONER

## 2023-11-13 PROCEDURE — 1160F RVW MEDS BY RX/DR IN RCRD: CPT | Mod: ,,, | Performed by: NURSE PRACTITIONER

## 2023-11-13 PROCEDURE — 1159F MED LIST DOCD IN RCRD: CPT | Mod: ,,, | Performed by: NURSE PRACTITIONER

## 2023-11-13 PROCEDURE — 3061F PR NEG MICROALBUMINURIA RESULT DOCUMENTED/REVIEW: ICD-10-PCS | Mod: ,,, | Performed by: NURSE PRACTITIONER

## 2023-11-13 PROCEDURE — 3044F HG A1C LEVEL LT 7.0%: CPT | Mod: ,,, | Performed by: NURSE PRACTITIONER

## 2023-11-13 PROCEDURE — 87426 SARSCOV CORONAVIRUS AG IA: CPT | Mod: QW,,, | Performed by: NURSE PRACTITIONER

## 2023-11-13 PROCEDURE — 87804 INFLUENZA ASSAY W/OPTIC: CPT | Mod: QW,,, | Performed by: NURSE PRACTITIONER

## 2023-11-13 PROCEDURE — 99213 OFFICE O/P EST LOW 20 MIN: CPT | Mod: 25,,, | Performed by: NURSE PRACTITIONER

## 2023-11-13 PROCEDURE — 3074F SYST BP LT 130 MM HG: CPT | Mod: ,,, | Performed by: NURSE PRACTITIONER

## 2023-11-13 PROCEDURE — 3008F BODY MASS INDEX DOCD: CPT | Mod: ,,, | Performed by: NURSE PRACTITIONER

## 2023-11-13 PROCEDURE — 96372 PR INJECTION,THERAP/PROPH/DIAG2ST, IM OR SUBCUT: ICD-10-PCS | Mod: ,,, | Performed by: NURSE PRACTITIONER

## 2023-11-13 PROCEDURE — 3008F PR BODY MASS INDEX (BMI) DOCUMENTED: ICD-10-PCS | Mod: ,,, | Performed by: NURSE PRACTITIONER

## 2023-11-13 PROCEDURE — 87804 POCT INFLUENZA A/B: ICD-10-PCS | Mod: QW,,, | Performed by: NURSE PRACTITIONER

## 2023-11-13 PROCEDURE — 3078F PR MOST RECENT DIASTOLIC BLOOD PRESSURE < 80 MM HG: ICD-10-PCS | Mod: ,,, | Performed by: NURSE PRACTITIONER

## 2023-11-13 PROCEDURE — 1160F PR REVIEW ALL MEDS BY PRESCRIBER/CLIN PHARMACIST DOCUMENTED: ICD-10-PCS | Mod: ,,, | Performed by: NURSE PRACTITIONER

## 2023-11-13 PROCEDURE — 3044F PR MOST RECENT HEMOGLOBIN A1C LEVEL <7.0%: ICD-10-PCS | Mod: ,,, | Performed by: NURSE PRACTITIONER

## 2023-11-13 PROCEDURE — 3066F NEPHROPATHY DOC TX: CPT | Mod: ,,, | Performed by: NURSE PRACTITIONER

## 2023-11-13 PROCEDURE — 3074F PR MOST RECENT SYSTOLIC BLOOD PRESSURE < 130 MM HG: ICD-10-PCS | Mod: ,,, | Performed by: NURSE PRACTITIONER

## 2023-11-13 PROCEDURE — 1159F PR MEDICATION LIST DOCUMENTED IN MEDICAL RECORD: ICD-10-PCS | Mod: ,,, | Performed by: NURSE PRACTITIONER

## 2023-11-13 PROCEDURE — 99213 PR OFFICE/OUTPT VISIT, EST, LEVL III, 20-29 MIN: ICD-10-PCS | Mod: 25,,, | Performed by: NURSE PRACTITIONER

## 2023-11-13 PROCEDURE — 96372 THER/PROPH/DIAG INJ SC/IM: CPT | Mod: ,,, | Performed by: NURSE PRACTITIONER

## 2023-11-13 RX ORDER — DEXAMETHASONE SODIUM PHOSPHATE 4 MG/ML
4 INJECTION, SOLUTION INTRA-ARTICULAR; INTRALESIONAL; INTRAMUSCULAR; INTRAVENOUS; SOFT TISSUE
Status: COMPLETED | OUTPATIENT
Start: 2023-11-13 | End: 2023-11-13

## 2023-11-13 RX ORDER — CODEINE PHOSPHATE AND GUAIFENESIN 10; 100 MG/5ML; MG/5ML
10 SOLUTION ORAL EVERY 8 HOURS PRN
Qty: 120 ML | Refills: 0 | Status: SHIPPED | OUTPATIENT
Start: 2023-11-13 | End: 2023-11-23

## 2023-11-13 RX ORDER — CEFTRIAXONE 1 G/1
1 INJECTION, POWDER, FOR SOLUTION INTRAMUSCULAR; INTRAVENOUS
Status: COMPLETED | OUTPATIENT
Start: 2023-11-13 | End: 2023-11-13

## 2023-11-13 RX ADMIN — DEXAMETHASONE SODIUM PHOSPHATE 4 MG: 4 INJECTION, SOLUTION INTRA-ARTICULAR; INTRALESIONAL; INTRAMUSCULAR; INTRAVENOUS; SOFT TISSUE at 02:11

## 2023-11-13 RX ADMIN — CEFTRIAXONE 1 G: 1 INJECTION, POWDER, FOR SOLUTION INTRAMUSCULAR; INTRAVENOUS at 02:11

## 2023-11-13 NOTE — LETTER
November 13, 2023    Yajenna Randolph  [address] [facility] [facility] [place] [address]         [facility] [address] [place] [address]  Phone: [phone]  Fax: [phone] November 13, 2023     Patient: Marcell Dickson   YOB: 1970   Date of Visit: 11/13/2023       To Whom It May Concern:    It is my medical opinion that Marcell SimonaniJose may return to work on 11/15/2023 .    If you have any questions or concerns, please don't hesitate to call.    Sincerely,        Terri Iglesias FNP

## 2023-11-14 NOTE — PROGRESS NOTES
Clinic Note    Marcell Dickson is a 53 y.o. female     Chief Complaint:   Chief Complaint   Patient presents with    Cough    Chills     X 4 days     Generalized Body Aches        Subjective:    Patient complains of cough, chills, and body aches. Symptoms X 4 days. Denies fever. Admits to sinus congestion. Denies sore throat. Cough is dry and nonproductive. States unable to sleep well due to cough.     Cough  Associated symptoms include chills and headaches. Pertinent negatives include no chest pain, ear pain, fever, sore throat or shortness of breath.        Allergies:   Review of patient's allergies indicates:   Allergen Reactions    Sulfa (sulfonamide antibiotics)         Past Medical History:  Past Medical History:   Diagnosis Date    Arthritis     GERD (gastroesophageal reflux disease)     History of rectal polyps 08/07/2018        Current Medications:    Current Outpatient Medications:     cetirizine (ZYRTEC) 10 MG tablet, TAKE 1 TABLET BY MOUTH EVERY DAY, Disp: 90 tablet, Rfl: 1    furosemide (LASIX) 20 MG tablet, Take 1 tablet (20 mg total) by mouth once daily., Disp: 90 tablet, Rfl: 1    hydroCHLOROthiazide (HYDRODIURIL) 25 MG tablet, Take 1 tablet (25 mg total) by mouth once daily., Disp: 90 tablet, Rfl: 1    hydrOXYzine pamoate (VISTARIL) 25 MG Cap, TAKE 1 CAPSULE(25 MG) BY MOUTH EVERY 8 HOURS AS NEEDED FOR ITCHING OR ANXIETY, Disp: 45 capsule, Rfl: 2    linaCLOtide (LINZESS) 290 mcg Cap capsule, Take 1 capsule (290 mcg total) by mouth before breakfast., Disp: 90 capsule, Rfl: 1    pantoprazole (PROTONIX) 40 MG tablet, TAKE 1 TABLET(40 MG) BY MOUTH EVERY DAY, Disp: 90 tablet, Rfl: 1    potassium chloride (MICRO-K) 10 MEQ CpSR, Take 1 capsule (10 mEq total) by mouth 2 (two) times daily., Disp: 60 capsule, Rfl: 2    pregabalin (LYRICA) 100 MG capsule, Take 1 capsule (100 mg total) by mouth 2 (two) times daily., Disp: 60 capsule, Rfl: 2    traZODone (DESYREL) 100 MG tablet, Take 1 tablet (100 mg  "total) by mouth every evening., Disp: 90 tablet, Rfl: 1    guaiFENesin-codeine 100-10 mg/5 ml (TUSSI-ORGANIDIN NR)  mg/5 mL syrup, Take 10 mLs by mouth every 8 (eight) hours as needed for Cough., Disp: 120 mL, Rfl: 0       Review of Systems   Constitutional:  Positive for chills and fatigue. Negative for fever.   HENT:  Positive for nasal congestion and sinus pressure/congestion. Negative for ear discharge, ear pain and sore throat.    Respiratory:  Positive for cough. Negative for shortness of breath.    Cardiovascular:  Negative for chest pain.   Gastrointestinal:  Negative for abdominal pain.   Genitourinary:  Negative for dysuria.   Neurological:  Positive for headaches.          Objective:    BP 94/65 (BP Location: Left arm, Patient Position: Sitting, BP Method: Large (Automatic))   Pulse 82   Temp 98.1 °F (36.7 °C) (Oral)   Resp 18   Ht 5' 6" (1.676 m)   Wt 114.4 kg (252 lb 3.2 oz)   SpO2 95%   BMI 40.71 kg/m²      Physical Exam  Constitutional:       Appearance: Normal appearance. She is obese.   HENT:      Nose: Congestion present. No rhinorrhea.      Right Sinus: Maxillary sinus tenderness and frontal sinus tenderness present.      Left Sinus: Maxillary sinus tenderness and frontal sinus tenderness present.      Mouth/Throat:      Pharynx: No oropharyngeal exudate or posterior oropharyngeal erythema.   Eyes:      Extraocular Movements: Extraocular movements intact.   Cardiovascular:      Rate and Rhythm: Normal rate and regular rhythm.      Pulses: Normal pulses.      Heart sounds: Normal heart sounds.   Pulmonary:      Effort: Pulmonary effort is normal.      Breath sounds: Normal breath sounds.   Abdominal:      Palpations: Abdomen is soft.      Tenderness: There is no abdominal tenderness.   Musculoskeletal:      Cervical back: Neck supple.   Lymphadenopathy:      Cervical: No cervical adenopathy.   Neurological:      Mental Status: She is alert and oriented to person, place, and time.      "     Assessment and Plan:    1. Upper respiratory tract infection, unspecified type    2. Cough, unspecified type    3. Chills    4. Generalized body aches         Upper respiratory tract infection, unspecified type    Cough, unspecified type  -     POCT Influenza A/B Rapid Antigen  -     SARS Coronavirus 2 Antigen, POCT  -     cefTRIAXone injection 1 g  -     dexAMETHasone injection 4 mg  -     guaiFENesin-codeine 100-10 mg/5 ml (TUSSI-ORGANIDIN NR)  mg/5 mL syrup; Take 10 mLs by mouth every 8 (eight) hours as needed for Cough.  Dispense: 120 mL; Refill: 0    Chills  -     POCT Influenza A/B Rapid Antigen  -     SARS Coronavirus 2 Antigen, POCT    Generalized body aches  -     POCT Influenza A/B Rapid Antigen  -     SARS Coronavirus 2 Antigen, POCT        Results for orders placed or performed in visit on 11/13/23   SARS Coronavirus 2 Antigen, POCT   Result Value Ref Range    SARS Coronavirus 2 Antigen Negative Negative     Acceptable Yes      Results for orders placed or performed in visit on 11/13/23   POCT Influenza A/B Rapid Antigen   Result Value Ref Range    Rapid Influenza A Ag Negative Negative    Rapid Influenza B Ag Negative Negative     Acceptable Yes      There are no Patient Instructions on file for this visit.   Follow up if symptoms worsen or fail to improve.

## 2023-11-25 ENCOUNTER — OFFICE VISIT (OUTPATIENT)
Dept: FAMILY MEDICINE | Facility: CLINIC | Age: 53
End: 2023-11-25
Payer: COMMERCIAL

## 2023-11-25 VITALS
TEMPERATURE: 98 F | BODY MASS INDEX: 40.5 KG/M2 | HEIGHT: 66 IN | SYSTOLIC BLOOD PRESSURE: 109 MMHG | DIASTOLIC BLOOD PRESSURE: 73 MMHG | RESPIRATION RATE: 18 BRPM | WEIGHT: 252 LBS | OXYGEN SATURATION: 100 % | HEART RATE: 76 BPM

## 2023-11-25 DIAGNOSIS — R09.81 COUGH WITH CONGESTION OF PARANASAL SINUS: Primary | ICD-10-CM

## 2023-11-25 DIAGNOSIS — J40 BRONCHITIS: ICD-10-CM

## 2023-11-25 DIAGNOSIS — R05.8 COUGH WITH CONGESTION OF PARANASAL SINUS: Primary | ICD-10-CM

## 2023-11-25 PROCEDURE — 3008F PR BODY MASS INDEX (BMI) DOCUMENTED: ICD-10-PCS | Mod: ,,, | Performed by: NURSE PRACTITIONER

## 2023-11-25 PROCEDURE — 3074F SYST BP LT 130 MM HG: CPT | Mod: ,,, | Performed by: NURSE PRACTITIONER

## 2023-11-25 PROCEDURE — 1160F RVW MEDS BY RX/DR IN RCRD: CPT | Mod: ,,, | Performed by: NURSE PRACTITIONER

## 2023-11-25 PROCEDURE — 99213 OFFICE O/P EST LOW 20 MIN: CPT | Mod: ,,, | Performed by: NURSE PRACTITIONER

## 2023-11-25 PROCEDURE — 3061F NEG MICROALBUMINURIA REV: CPT | Mod: ,,, | Performed by: NURSE PRACTITIONER

## 2023-11-25 PROCEDURE — 3044F PR MOST RECENT HEMOGLOBIN A1C LEVEL <7.0%: ICD-10-PCS | Mod: ,,, | Performed by: NURSE PRACTITIONER

## 2023-11-25 PROCEDURE — 3044F HG A1C LEVEL LT 7.0%: CPT | Mod: ,,, | Performed by: NURSE PRACTITIONER

## 2023-11-25 PROCEDURE — 3078F DIAST BP <80 MM HG: CPT | Mod: ,,, | Performed by: NURSE PRACTITIONER

## 2023-11-25 PROCEDURE — 99051 MED SERV EVE/WKEND/HOLIDAY: CPT | Mod: ,,, | Performed by: NURSE PRACTITIONER

## 2023-11-25 PROCEDURE — 3078F PR MOST RECENT DIASTOLIC BLOOD PRESSURE < 80 MM HG: ICD-10-PCS | Mod: ,,, | Performed by: NURSE PRACTITIONER

## 2023-11-25 PROCEDURE — 3008F BODY MASS INDEX DOCD: CPT | Mod: ,,, | Performed by: NURSE PRACTITIONER

## 2023-11-25 PROCEDURE — 1159F MED LIST DOCD IN RCRD: CPT | Mod: ,,, | Performed by: NURSE PRACTITIONER

## 2023-11-25 PROCEDURE — 1160F PR REVIEW ALL MEDS BY PRESCRIBER/CLIN PHARMACIST DOCUMENTED: ICD-10-PCS | Mod: ,,, | Performed by: NURSE PRACTITIONER

## 2023-11-25 PROCEDURE — 99051 PR MEDICAL SERVICES, EVE/WKEND/HOLIDAY: ICD-10-PCS | Mod: ,,, | Performed by: NURSE PRACTITIONER

## 2023-11-25 PROCEDURE — 3074F PR MOST RECENT SYSTOLIC BLOOD PRESSURE < 130 MM HG: ICD-10-PCS | Mod: ,,, | Performed by: NURSE PRACTITIONER

## 2023-11-25 PROCEDURE — 1159F PR MEDICATION LIST DOCUMENTED IN MEDICAL RECORD: ICD-10-PCS | Mod: ,,, | Performed by: NURSE PRACTITIONER

## 2023-11-25 PROCEDURE — 3066F PR DOCUMENTATION OF TREATMENT FOR NEPHROPATHY: ICD-10-PCS | Mod: ,,, | Performed by: NURSE PRACTITIONER

## 2023-11-25 PROCEDURE — 99213 PR OFFICE/OUTPT VISIT, EST, LEVL III, 20-29 MIN: ICD-10-PCS | Mod: ,,, | Performed by: NURSE PRACTITIONER

## 2023-11-25 PROCEDURE — 3066F NEPHROPATHY DOC TX: CPT | Mod: ,,, | Performed by: NURSE PRACTITIONER

## 2023-11-25 PROCEDURE — 3061F PR NEG MICROALBUMINURIA RESULT DOCUMENTED/REVIEW: ICD-10-PCS | Mod: ,,, | Performed by: NURSE PRACTITIONER

## 2023-11-25 RX ORDER — CEFUROXIME AXETIL 500 MG/1
500 TABLET ORAL EVERY 12 HOURS
Qty: 20 TABLET | Refills: 0 | Status: SHIPPED | OUTPATIENT
Start: 2023-11-25 | End: 2023-12-05

## 2023-11-25 RX ORDER — PREDNISONE 20 MG/1
20 TABLET ORAL DAILY
Qty: 5 TABLET | Refills: 0 | Status: SHIPPED | OUTPATIENT
Start: 2023-11-25 | End: 2023-11-30

## 2023-11-25 RX ORDER — ALBUTEROL SULFATE 90 UG/1
2 AEROSOL, METERED RESPIRATORY (INHALATION) EVERY 4 HOURS PRN
Qty: 18 G | Refills: 0 | Status: SHIPPED | OUTPATIENT
Start: 2023-11-25 | End: 2023-12-09

## 2023-11-25 RX ORDER — FLUCONAZOLE 150 MG/1
150 TABLET ORAL
Qty: 2 TABLET | Refills: 0 | Status: SHIPPED | OUTPATIENT
Start: 2023-11-25 | End: 2023-11-29

## 2023-11-25 NOTE — PROGRESS NOTES
BEE Sahu    [address] [place] [address]     PATIENT NAME: Marcell Dickson  : 1970  DATE: 23  MRN: [id number]      Billing Provider: BEE Sahu  Level of Service: [facility] OFFICE/OUTPT VISIT, EST, LEVL III, 20-29 MIN    ASSESSMENT AND PLAN:      Problem List Items Addressed This Visit    None  Visit Diagnoses       Cough with congestion of paranasal sinus    -  Primary    Relevant Medications    predniSONE (DELTASONE) 20 MG tablet    cefUROXime (CEFTIN) 500 MG tablet    albuterol (VENTOLIN HFA) 90 mcg/actuation inhaler    Bronchitis        Relevant Medications    predniSONE (DELTASONE) 20 MG tablet    cefUROXime (CEFTIN) 500 MG tablet    albuterol (VENTOLIN HFA) 90 mcg/actuation inhaler        Begin albuterol inhaler two puffs every 4 hours as needed for cough/ shortness of breath/wheeze  Begin cefuroxime 500 mg one pill by mouth twice a day for 10 days  Begin Prednisone 20 mg daily for 5 days  Blow nose often  Good handwashing  Follow up with PCP  Vicks Nasal spray as needed for nasal congestion    Health Maintenance Topics with due status: Not Due       Topic Last Completion Date    Cervical Cancer Screening 2023    Mammogram 2023    Hemoglobin A1c (Diabetic Prevention Screening) 2023    Colorectal Cancer Screening 2023    Lipid Panel 10/16/2023     Future Appointments   Date Time Provider Department Center   2024  7:40 AM Arlet Rodriguez MD [facility]   2024  9:00 AM [facility] GI ROOM [address] [place] [address]   2024  8:00 AM Arlet Rodriguez MD [facility]      No follow-ups on file. or sooner as needed.      CHIEF COMPLAINT: Cough (Been coughing for two weeks since she went on a cruise to the [facility]. She started with chills and feeling bad. She tested several times for covid and saw Dr. Wild and she gave her injections last Monday. She  reports she is a little better than she was then but cannot shake it. ) and Shortness of Breath (C/o shortness of breath since last week on exertion. She is scheduled for repeat CT of chest next month. )           HISTORY OF PRESENT ILLNESS:  Marcell Dickson is a 53 y.o. female who presents to the clinic today     Marcell Dickson presents to the clinic with Cough (Been coughing for two weeks since she went on a cruise to the [facility]. She started with chills and feeling bad. She tested several times for covid and saw Dr. iWld and she gave her injections last Monday. She reports she is a little better than she was then but cannot shake it. ) and Shortness of Breath (C/o shortness of breath since last week on exertion. She is scheduled for repeat CT of chest next month. )     CT chest reviewed from 8/2023: 2 8 mm nodules noted and scheduled for recheck/repeat CT soon  Seen by outside clinic provider 11/13/2023 for sinus issues and treated with cough medication/ rocephin/decadron injection      Cough  This is a chronic problem. The current episode started 1 to 4 weeks ago. The problem has been waxing and waning. The problem occurs every few hours. The cough is Non-productive. Associated symptoms include ear congestion, nasal congestion and shortness of breath. Nothing aggravates the symptoms. She has tried prescription cough suppressant for the symptoms. The treatment provided no relief. Her past medical history is significant for environmental allergies.   Shortness of Breath  This is a new problem. The current episode started 1 to 4 weeks ago. The problem has been waxing and waning. Nothing aggravates the symptoms. The patient has no known risk factors for DVT/PE. She has tried prescription cough suppressants for the symptoms. The treatment provided no relief.       PAST MEDICAL HISTORY:      Past Medical History:   Diagnosis Date    Arthritis     GERD (gastroesophageal reflux disease)      History of rectal polyps 08/07/2018     PAST SURGICAL HISTORY:  Past Surgical History:   Procedure Laterality Date    breast reduction      CARPAL TUNNEL RELEASE Right 9/28/2023    Procedure: RELEASE, CARPAL TUNNEL;  Surgeon: Hong Rodriguez MD;  Location: [facility] OR;  Service: Orthopedics;  Laterality: Right;    CHOLECYSTECTOMY  10/28/2013    Dr. Paige    COLONOSCOPY  2018    DE QUERVAIN'S RELEASE Right 9/28/2023    Procedure: RELEASE, HAND, FOR DEQUERVAIN'S TENOSYNOVITIS;  Surgeon: Hong Rodriguez MD;  Location: [facility] OR;  Service: Orthopedics;  Laterality: Right;    EXCISION OF GANGLION OF WRIST Right 9/28/2023    Procedure: EXCISION, GANGLION CYST, WRIST;  Surgeon: Hong Rodriguez MD;  Location: [facility] OR;  Service: Orthopedics;  Laterality: Right;    gastric sleeve      HYSTERECTOMY  10/28/2013    Robotic, BSO,Cystoscopy, TOT-    OOPHORECTOMY      TUBAL LIGATION      tummy tuck       SOCIAL HISTORY:  Social History     Socioeconomic History    Marital status:      Spouse name: Guillermo    Number of children: 4   Tobacco Use    Smoking status: Never     Passive exposure: Never    Smokeless tobacco: Never   Substance and Sexual Activity    Alcohol use: Yes     Comment: occasionally    Drug use: Never    Sexual activity: Yes     FAMILY HISTORY:       Family History   Problem Relation Age of Onset    Heart failure Mother     Heart disease Father     Colon cancer Sister     Stomach cancer Brother     Lung cancer Brother        ALLERGIES AND MEDICATIONS: updated and reviewed.       Review of patient's allergies indicates:   Allergen Reactions    Sulfa (sulfonamide antibiotics)      Medication List with Changes/Refills   New Medications    ALBUTEROL (VENTOLIN HFA) 90 MCG/ACTUATION INHALER    Inhale 2 puffs into the lungs every 4 (four) hours as needed for Wheezing or Shortness of Breath (cough). Rescue    CEFUROXIME (CEFTIN) 500 MG TABLET    Take 1 tablet (500 mg  total) by mouth every 12 (twelve) hours. for 10 days    FLUCONAZOLE (DIFLUCAN) 150 MG TAB    Take 1 tablet (150 mg total) by mouth Every 3 (three) days. for 2 doses    PREDNISONE (DELTASONE) 20 MG TABLET    Take 1 tablet (20 mg total) by mouth once daily. for 5 days   Current Medications    CETIRIZINE (ZYRTEC) 10 MG TABLET    TAKE 1 TABLET BY MOUTH EVERY DAY    FUROSEMIDE (LASIX) 20 MG TABLET    Take 1 tablet (20 mg total) by mouth once daily.    HYDROCHLOROTHIAZIDE (HYDRODIURIL) 25 MG TABLET    Take 1 tablet (25 mg total) by mouth once daily.    HYDROXYZINE PAMOATE (VISTARIL) 25 MG CAP    TAKE 1 CAPSULE(25 MG) BY MOUTH EVERY 8 HOURS AS NEEDED FOR ITCHING OR ANXIETY    LINACLOTIDE (LINZESS) 290 MCG CAP CAPSULE    Take 1 capsule (290 mcg total) by mouth before breakfast.    PANTOPRAZOLE (PROTONIX) 40 MG TABLET    TAKE 1 TABLET(40 MG) BY MOUTH EVERY DAY    PREGABALIN (LYRICA) 100 MG CAPSULE    Take 1 capsule (100 mg total) by mouth 2 (two) times daily.    TRAZODONE (DESYREL) 100 MG TABLET    Take 1 tablet (100 mg total) by mouth every evening.   Discontinued Medications    POTASSIUM CHLORIDE (MICRO-K) 10 MEQ CPSR    Take 1 capsule (10 mEq total) by mouth 2 (two) times daily.       SCREENING HISTORY:     Health Maintenance         Date Due Completion Date    Pneumococcal Vaccines (Age 0-64) (1 - PCV) Never done ---    HIV Screening Never done ---    TETANUS VACCINE Never done ---    COVID-19 Vaccine (5 - 2023-24 season) 09/01/2023 12/21/2022    Mammogram 03/03/2024 3/3/2023    Cervical Cancer Screening 03/01/2026 3/1/2023    Hemoglobin A1c (Diabetic Prevention Screening) 03/28/2026 3/28/2023    Colorectal Cancer Screening 08/28/2028 8/28/2023    Lipid Panel 10/16/2028 10/16/2023            REVIEW OF SYSTEMS:   Review of Systems   Respiratory:  Positive for cough and shortness of breath.    Allergic/Immunologic: Positive for environmental allergies.         PHYSICAL EXAM:         /73 (BP Location: Left arm,  "Patient Position: Sitting, BP Method: Large (Automatic))   Pulse 76   Temp 97.7 °F (36.5 °C) (Oral)   Resp 18   Ht 5' 6" (1.676 m)   Wt 114.3 kg (252 lb)   SpO2 100%   BMI 40.67 kg/m²  No LMP recorded. Patient has had a hysterectomy.  Wt Readings from Last 3 Encounters:   11/25/23 114.3 kg (252 lb)   11/13/23 114.4 kg (252 lb 3.2 oz)   10/16/23 113.8 kg (250 lb 14.4 oz)     BP Readings from Last 3 Encounters:   11/25/23 109/73   11/13/23 94/65   10/16/23 133/87     Estimated body mass index is 40.67 kg/m² as calculated from the following:    Height as of this encounter: 5' 6" (1.676 m).    Weight as of this encounter: 114.3 kg (252 lb).     Physical Exam  HENT:      Head: Normocephalic.      Right Ear: A middle ear effusion is present.      Left Ear: A middle ear effusion is present.      Nose:      Right Turbinates: Swollen.      Left Turbinates: Swollen.      Mouth/Throat:      Lips: Pink.   Cardiovascular:      Rate and Rhythm: Normal rate and regular rhythm.      Pulses: Normal pulses.      Heart sounds: Normal heart sounds.   Pulmonary:      Effort: Pulmonary effort is normal.      Comments: Bronchial breath sounds during cough/ coarse  Musculoskeletal:      Cervical back: Neck supple.   Neurological:      Mental Status: She is alert and oriented to person, place, and time.   Psychiatric:         Mood and Affect: Mood normal.         LABS:     I have reviewed old labs below:  Lab Results   Component Value Date    WBC 5.97 10/16/2023    HGB 11.9 (L) 10/16/2023    HCT 38.6 10/16/2023    MCV 96.0 10/16/2023     10/16/2023     10/16/2023    K 3.4 (L) 10/16/2023     10/16/2023    CALCIUM 9.3 10/16/2023    CO2 31 10/16/2023    GLU 74 10/16/2023    BUN 16 10/16/2023    CREATININE 1.09 (H) 10/16/2023    ANIONGAP 8 10/16/2023    EGFRNONAA 62 06/28/2022    PROT 7.7 10/16/2023    ALBUMIN 3.6 10/16/2023    BILITOT 0.4 10/16/2023    ALKPHOS 136 (H) 10/16/2023    ALT 26 10/16/2023    AST 21 " 10/16/2023    CHOL 139 10/16/2023    TRIG 70 10/16/2023    HDL 81 (H) 10/16/2023    LDLCALC 44 10/16/2023    TSH 1.030 05/03/2023    HGBA1C 4.9 03/28/2023    MICROALBUR <0.5 10/16/2023           Signature:  BEE Sahu  [address] [place] [address]  Phone #: [phone]  Fax #: [phone]       Date of encounter: 11/25/23    Patient Instructions   Begin albuterol inhaler two puffs every 4 hours as needed for cough/ shortness of breath/wheeze  Begin cefuroxime 500 mg one pill by mouth twice a day for 10 days  Begin Prednisone 20 mg daily for 5 days  Blow nose often  Good handwashing  Follow up with PCP  Vicks Nasal spray as needed for nasal congestion

## 2023-11-25 NOTE — PATIENT INSTRUCTIONS
Begin albuterol inhaler two puffs every 4 hours as needed for cough/ shortness of breath/wheeze  Begin cefuroxime 500 mg one pill by mouth twice a day for 10 days  Begin Prednisone 20 mg daily for 5 days  Blow nose often  Good handwashing  Follow up with PCP  Vicks Nasal spray as needed for nasal congestion

## 2023-11-27 ENCOUNTER — TELEPHONE (OUTPATIENT)
Dept: FAMILY MEDICINE | Facility: CLINIC | Age: 53
End: 2023-11-27
Payer: COMMERCIAL

## 2023-11-27 DIAGNOSIS — R91.1 LUNG NODULE: Primary | ICD-10-CM

## 2023-11-27 NOTE — TELEPHONE ENCOUNTER
----- Message from Arlet Rodriguez MD sent at 11/27/2023  8:07 AM CST -----  Ok to order CT chest without contrast for diagnosis of lung nodule and have Juliánta to schedule. It looks like this was found incidentally on Ct ordered by pain management. They probably told her to contact us for follow up imaging. Thanks!  ----- Message -----  From: Virginia Beatty LPN  Sent: 11/22/2023   2:22 PM CST  To: Arlet Rodriguez MD    Pt called and requested chest ct. Said it is time for another one as it has been 4-5 months since last one?

## 2023-11-30 DIAGNOSIS — M89.49 OSTEOARTHROSIS MULTIPLE SITES, NOT SPECIFIED AS GENERALIZED: Chronic | ICD-10-CM

## 2023-11-30 DIAGNOSIS — M54.17 LUMBOSACRAL RADICULOPATHY: Chronic | ICD-10-CM

## 2023-11-30 RX ORDER — PREGABALIN 100 MG/1
100 CAPSULE ORAL 2 TIMES DAILY
Qty: 60 CAPSULE | Refills: 2 | Status: SHIPPED | OUTPATIENT
Start: 2023-11-30 | End: 2024-02-20 | Stop reason: SDUPTHER

## 2023-12-07 ENCOUNTER — HOSPITAL ENCOUNTER (OUTPATIENT)
Dept: RADIOLOGY | Facility: HOSPITAL | Age: 53
Discharge: HOME | End: 2023-12-07
Attending: FAMILY MEDICINE
Payer: COMMERCIAL

## 2023-12-07 DIAGNOSIS — R91.1 LUNG NODULE: ICD-10-CM

## 2023-12-07 PROCEDURE — 71250 CT THORAX DX C-: CPT | Mod: TC

## 2023-12-08 DIAGNOSIS — R05.8 COUGH WITH CONGESTION OF PARANASAL SINUS: ICD-10-CM

## 2023-12-08 DIAGNOSIS — J40 BRONCHITIS: ICD-10-CM

## 2023-12-08 DIAGNOSIS — R09.81 COUGH WITH CONGESTION OF PARANASAL SINUS: ICD-10-CM

## 2023-12-09 RX ORDER — ALBUTEROL SULFATE 90 UG/1
AEROSOL, METERED RESPIRATORY (INHALATION)
Qty: 8.5 G | Refills: 0 | Status: SHIPPED | OUTPATIENT
Start: 2023-12-09 | End: 2024-02-20 | Stop reason: SDUPTHER

## 2023-12-09 NOTE — TELEPHONE ENCOUNTER
Pt stated she did not request it but she is still short of breath and coughing if you would allow her to refill it. She also wanted recommendations on OTC cough medications that she could take for her hacking cough that she has started. Please advise.

## 2023-12-13 ENCOUNTER — OFFICE VISIT (OUTPATIENT)
Dept: FAMILY MEDICINE | Facility: CLINIC | Age: 53
End: 2023-12-13
Payer: COMMERCIAL

## 2023-12-13 VITALS
TEMPERATURE: 99 F | HEART RATE: 83 BPM | HEIGHT: 66 IN | SYSTOLIC BLOOD PRESSURE: 106 MMHG | WEIGHT: 255 LBS | DIASTOLIC BLOOD PRESSURE: 75 MMHG | BODY MASS INDEX: 40.98 KG/M2 | OXYGEN SATURATION: 98 %

## 2023-12-13 DIAGNOSIS — J20.9 CHRONIC BRONCHITIS WITH ACUTE EXACERBATION: Primary | ICD-10-CM

## 2023-12-13 DIAGNOSIS — K44.9 HIATAL HERNIA: Chronic | ICD-10-CM

## 2023-12-13 DIAGNOSIS — K21.00 GASTROESOPHAGEAL REFLUX DISEASE WITH ESOPHAGITIS WITHOUT HEMORRHAGE: ICD-10-CM

## 2023-12-13 DIAGNOSIS — J42 CHRONIC BRONCHITIS WITH ACUTE EXACERBATION: Primary | ICD-10-CM

## 2023-12-13 PROCEDURE — 1160F RVW MEDS BY RX/DR IN RCRD: CPT | Mod: ,,, | Performed by: NURSE PRACTITIONER

## 2023-12-13 PROCEDURE — 1159F PR MEDICATION LIST DOCUMENTED IN MEDICAL RECORD: ICD-10-PCS | Mod: ,,, | Performed by: NURSE PRACTITIONER

## 2023-12-13 PROCEDURE — 3066F NEPHROPATHY DOC TX: CPT | Mod: ,,, | Performed by: NURSE PRACTITIONER

## 2023-12-13 PROCEDURE — 99213 OFFICE O/P EST LOW 20 MIN: CPT | Mod: ,,, | Performed by: NURSE PRACTITIONER

## 2023-12-13 PROCEDURE — 3066F PR DOCUMENTATION OF TREATMENT FOR NEPHROPATHY: ICD-10-PCS | Mod: ,,, | Performed by: NURSE PRACTITIONER

## 2023-12-13 PROCEDURE — 3078F PR MOST RECENT DIASTOLIC BLOOD PRESSURE < 80 MM HG: ICD-10-PCS | Mod: ,,, | Performed by: NURSE PRACTITIONER

## 2023-12-13 PROCEDURE — 3061F PR NEG MICROALBUMINURIA RESULT DOCUMENTED/REVIEW: ICD-10-PCS | Mod: ,,, | Performed by: NURSE PRACTITIONER

## 2023-12-13 PROCEDURE — 3074F PR MOST RECENT SYSTOLIC BLOOD PRESSURE < 130 MM HG: ICD-10-PCS | Mod: ,,, | Performed by: NURSE PRACTITIONER

## 2023-12-13 PROCEDURE — 3044F PR MOST RECENT HEMOGLOBIN A1C LEVEL <7.0%: ICD-10-PCS | Mod: ,,, | Performed by: NURSE PRACTITIONER

## 2023-12-13 PROCEDURE — 99213 PR OFFICE/OUTPT VISIT, EST, LEVL III, 20-29 MIN: ICD-10-PCS | Mod: ,,, | Performed by: NURSE PRACTITIONER

## 2023-12-13 PROCEDURE — 3044F HG A1C LEVEL LT 7.0%: CPT | Mod: ,,, | Performed by: NURSE PRACTITIONER

## 2023-12-13 PROCEDURE — 3008F BODY MASS INDEX DOCD: CPT | Mod: ,,, | Performed by: NURSE PRACTITIONER

## 2023-12-13 PROCEDURE — 3061F NEG MICROALBUMINURIA REV: CPT | Mod: ,,, | Performed by: NURSE PRACTITIONER

## 2023-12-13 PROCEDURE — 3008F PR BODY MASS INDEX (BMI) DOCUMENTED: ICD-10-PCS | Mod: ,,, | Performed by: NURSE PRACTITIONER

## 2023-12-13 PROCEDURE — 3078F DIAST BP <80 MM HG: CPT | Mod: ,,, | Performed by: NURSE PRACTITIONER

## 2023-12-13 PROCEDURE — 1159F MED LIST DOCD IN RCRD: CPT | Mod: ,,, | Performed by: NURSE PRACTITIONER

## 2023-12-13 PROCEDURE — 1160F PR REVIEW ALL MEDS BY PRESCRIBER/CLIN PHARMACIST DOCUMENTED: ICD-10-PCS | Mod: ,,, | Performed by: NURSE PRACTITIONER

## 2023-12-13 PROCEDURE — 3074F SYST BP LT 130 MM HG: CPT | Mod: ,,, | Performed by: NURSE PRACTITIONER

## 2023-12-13 RX ORDER — IPRATROPIUM BROMIDE AND ALBUTEROL SULFATE 2.5; .5 MG/3ML; MG/3ML
3 SOLUTION RESPIRATORY (INHALATION) EVERY 6 HOURS PRN
Qty: 95 ML | Refills: 1 | Status: SHIPPED | OUTPATIENT
Start: 2023-12-13 | End: 2024-12-12

## 2023-12-13 RX ORDER — METHYLPREDNISOLONE 4 MG/1
TABLET ORAL
Qty: 21 EACH | Refills: 0 | Status: SHIPPED | OUTPATIENT
Start: 2023-12-13 | End: 2024-01-03

## 2023-12-13 RX ORDER — ESOMEPRAZOLE MAGNESIUM 40 MG/1
40 CAPSULE, DELAYED RELEASE ORAL
Qty: 90 CAPSULE | Refills: 1 | Status: SHIPPED | OUTPATIENT
Start: 2023-12-13 | End: 2024-01-17 | Stop reason: SDUPTHER

## 2023-12-13 NOTE — PROGRESS NOTES
BEE Sahu    [address] [place] [address]     PATIENT NAME: Marcell Dickson  : 1970  DATE: 23  MRN: [id number]      Billing Provider: BEE Sahu  Level of Service: [facility] OFFICE/OUTPT VISIT, EST, LEVL III, 20-29 MIN    ASSESSMENT AND PLAN:      Problem List Items Addressed This Visit          GI    Hiatal hernia (Chronic)    Relevant Medications    esomeprazole (NEXIUM) 40 MG capsule    Gastroesophageal reflux disease with esophagitis without hemorrhage    Relevant Medications    esomeprazole (NEXIUM) 40 MG capsule     Other Visit Diagnoses       Chronic bronchitis with acute exacerbation    -  Primary    Relevant Medications    albuterol-ipratropium (DUO-NEB) 2.5 mg-0.5 mg/3 mL nebulizer solution    methylPREDNISolone (MEDROL DOSEPACK) 4 mg tablet    Other Relevant Orders    NEBULIZER FOR HOME USE        Change protonix to Nexium 40 mg at bedtime  Medrol dose racheal for cough and bronchial inflammation  Nebulizer ordered with albuterol-ipratropium one vial every 6hours for cough  Return in 2 weeks for recheck  Expect improvement within 1 week    Health Maintenance Topics with due status: Not Due       Topic Last Completion Date    Cervical Cancer Screening 2023    Hemoglobin A1c (Diabetic Prevention Screening) 2023    Colorectal Cancer Screening 2023    Lipid Panel 10/16/2023     Future Appointments   Date Time Provider Department Center   2024  7:40 AM Arlet Rodriguez MD [facility]   2024  9:00 AM [facility] GI ROOM [facility]   2024  8:00 AM Arlet Rodriguez MD [facility]      Follow up in about 2 weeks (around 2023) for recheck. or sooner as needed.      CHIEF COMPLAINT: Cough (Patient state she has been dealing with a dry cough for over a month. Continues to use Albuterol inhaler. ), Otalgia, Sinusitis (Patient state she she  been having clear nasal drainage. ), and Did not bring medication for review           HISTORY OF PRESENT ILLNESS:  Marcell Dickson is a 53 y.o. female who presents to the clinic today     Marcell Dickson presents to the clinic with Cough (Patient state she has been dealing with a dry cough for over a month. Continues to use Albuterol inhaler. ), Otalgia, Sinusitis (Patient state she she been having clear nasal drainage. ), and Did not bring medication for review     HPI from OV 11/25/2023:CT chest reviewed from 8/2023: 2 8 mm nodules noted and scheduled for recheck/repeat CT soon  Seen by outside clinic provider 11/13/2023 for sinus issues and treated with cough medication/ rocephin/decadron injection    Here today with reports of improvement with oral steroid but cough returned  Cough with getting hot and when lying down  Reports pressure to right ear  Ready to be well  Reviewed CT from 11/27/2023-- no change     Cough  This is a chronic problem. The current episode started more than 1 month ago. The problem has been unchanged. The problem occurs every few minutes. The cough is Productive of sputum. Associated symptoms include ear pain, heartburn and rhinorrhea. Associated symptoms comments: Noted hiatal hernia  . Nothing aggravates the symptoms. She has tried a beta-agonist inhaler for the symptoms. The treatment provided mild relief. Her past medical history is significant for bronchitis.   Otalgia   There is pain in the right ear. This is a recurrent problem. The current episode started 1 to 4 weeks ago. The problem occurs every few minutes. The problem has been unchanged. There has been no fever. The pain is at a severity of 3/10. The pain is moderate. Associated symptoms include coughing and rhinorrhea. She has tried antibiotics, cold packs and acetaminophen for the symptoms. The treatment provided mild relief.       PAST MEDICAL HISTORY:      Past Medical History:   Diagnosis Date     Arthritis     GERD (gastroesophageal reflux disease)     History of rectal polyps 08/07/2018     PAST SURGICAL HISTORY:  Past Surgical History:   Procedure Laterality Date    breast reduction      CARPAL TUNNEL RELEASE Right 9/28/2023    Procedure: RELEASE, CARPAL TUNNEL;  Surgeon: Hong Rodriguez MD;  Location: [facility] OR;  Service: Orthopedics;  Laterality: Right;    CHOLECYSTECTOMY  10/28/2013    Dr. Paige    COLONOSCOPY  2018    DE QUERVAIN'S RELEASE Right 9/28/2023    Procedure: RELEASE, HAND, FOR DEQUERVAIN'S TENOSYNOVITIS;  Surgeon: Hong Rodriguez MD;  Location: [facility] OR;  Service: Orthopedics;  Laterality: Right;    EXCISION OF GANGLION OF WRIST Right 9/28/2023    Procedure: EXCISION, GANGLION CYST, WRIST;  Surgeon: Hong Rodriguez MD;  Location: [facility] OR;  Service: Orthopedics;  Laterality: Right;    gastric sleeve      HYSTERECTOMY  10/28/2013    Robotic, BSO,Cystoscopy, TOT-    OOPHORECTOMY      TUBAL LIGATION      tummy tuck       SOCIAL HISTORY:  Social History     Socioeconomic History    Marital status:      Spouse name: Guillermo    Number of children: 4   Tobacco Use    Smoking status: Never     Passive exposure: Never    Smokeless tobacco: Never   Substance and Sexual Activity    Alcohol use: Yes     Comment: occasionally    Drug use: Never    Sexual activity: Yes     Partners: Male     FAMILY HISTORY:       Family History   Problem Relation Age of Onset    Heart failure Mother     Heart disease Father     Colon cancer Sister     Stomach cancer Brother     Lung cancer Brother        ALLERGIES AND MEDICATIONS: updated and reviewed.       Review of patient's allergies indicates:   Allergen Reactions    Sulfa (sulfonamide antibiotics)      Medication List with Changes/Refills   New Medications    ALBUTEROL-IPRATROPIUM (DUO-NEB) 2.5 MG-0.5 MG/3 ML NEBULIZER SOLUTION    Take 3 mLs by nebulization every 6 (six) hours as needed for Wheezing or  Shortness of Breath (cough). Rescue    ESOMEPRAZOLE (NEXIUM) 40 MG CAPSULE    Take 1 capsule (40 mg total) by mouth before breakfast.    METHYLPREDNISOLONE (MEDROL DOSEPACK) 4 MG TABLET    use as directed   Current Medications    ALBUTEROL (PROVENTIL/VENTOLIN HFA) 90 MCG/ACTUATION INHALER    INHALE 2 PUFFS BY MOUTH EVERY 4 HOURS AS NEEDED FOR WHEEZING OR SHORTNESS OF BREATH    CETIRIZINE (ZYRTEC) 10 MG TABLET    TAKE 1 TABLET BY MOUTH EVERY DAY    FUROSEMIDE (LASIX) 20 MG TABLET    Take 1 tablet (20 mg total) by mouth once daily.    HYDROCHLOROTHIAZIDE (HYDRODIURIL) 25 MG TABLET    Take 1 tablet (25 mg total) by mouth once daily.    HYDROXYZINE PAMOATE (VISTARIL) 25 MG CAP    TAKE 1 CAPSULE(25 MG) BY MOUTH EVERY 8 HOURS AS NEEDED FOR ITCHING OR ANXIETY    LINACLOTIDE (LINZESS) 290 MCG CAP CAPSULE    Take 1 capsule (290 mcg total) by mouth before breakfast.    PREGABALIN (LYRICA) 100 MG CAPSULE    Take 1 capsule (100 mg total) by mouth 2 (two) times daily.    TRAZODONE (DESYREL) 100 MG TABLET    Take 1 tablet (100 mg total) by mouth every evening.   Discontinued Medications    PANTOPRAZOLE (PROTONIX) 40 MG TABLET    TAKE 1 TABLET(40 MG) BY MOUTH EVERY DAY       SCREENING HISTORY:     Health Maintenance         Date Due Completion Date    Pneumococcal Vaccines (Age 0-64) (1 - PCV) Never done ---    HIV Screening Never done ---    TETANUS VACCINE Never done ---    COVID-19 Vaccine (5 - 2023-24 season) 09/01/2023 12/21/2022    Mammogram 03/03/2024 3/3/2023    Cervical Cancer Screening 03/01/2026 3/1/2023    Hemoglobin A1c (Diabetic Prevention Screening) 03/28/2026 3/28/2023    Colorectal Cancer Screening 08/28/2028 8/28/2023    Lipid Panel 10/16/2028 10/16/2023            REVIEW OF SYSTEMS:   Review of Systems   Constitutional: Negative.    HENT:  Positive for ear pain and rhinorrhea.    Respiratory:  Positive for cough.    Gastrointestinal:  Positive for heartburn.   Genitourinary: Negative.          PHYSICAL EXAM:      "    /75 (BP Location: Left arm, Patient Position: Sitting)   Pulse 83   Temp 98.6 °F (37 °C) (Oral)   Ht 5' 6" (1.676 m)   Wt 115.7 kg (255 lb)   SpO2 98%   BMI 41.16 kg/m²  No LMP recorded. Patient has had a hysterectomy.  Wt Readings from Last 3 Encounters:   12/13/23 115.7 kg (255 lb)   11/25/23 114.3 kg (252 lb)   11/13/23 114.4 kg (252 lb 3.2 oz)     BP Readings from Last 3 Encounters:   12/13/23 106/75   11/25/23 109/73   11/13/23 94/65     Estimated body mass index is 41.16 kg/m² as calculated from the following:    Height as of this encounter: 5' 6" (1.676 m).    Weight as of this encounter: 115.7 kg (255 lb).     Physical Exam  Constitutional:       Appearance: She is obese.      Comments: Has had gastric bypass surgery  Has hiatal hernia as noted on CT chest results   HENT:      Head: Normocephalic.      Right Ear: Tympanic membrane is bulging.      Nose:      Right Turbinates: Swollen.      Mouth/Throat:      Pharynx: Oropharynx is clear.      Comments: PND  Cardiovascular:      Rate and Rhythm: Normal rate and regular rhythm.      Pulses: Normal pulses.   Pulmonary:      Effort: Pulmonary effort is normal.      Comments: Coarse bronchial breath sounds with stridor and longer expiratory effort    Musculoskeletal:      Cervical back: Neck supple.   Skin:     General: Skin is warm.   Neurological:      Mental Status: She is alert and oriented to person, place, and time.   Psychiatric:         Mood and Affect: Mood normal.         LABS:     I have reviewed old labs below:  Lab Results   Component Value Date    WBC 5.97 10/16/2023    HGB 11.9 (L) 10/16/2023    HCT 38.6 10/16/2023    MCV 96.0 10/16/2023     10/16/2023     10/16/2023    K 3.4 (L) 10/16/2023     10/16/2023    CALCIUM 9.3 10/16/2023    CO2 31 10/16/2023    GLU 74 10/16/2023    BUN 16 10/16/2023    CREATININE 1.09 (H) 10/16/2023    ANIONGAP 8 10/16/2023    EGFRNONAA 62 06/28/2022    PROT 7.7 10/16/2023    ALBUMIN 3.6 " 10/16/2023    BILITOT 0.4 10/16/2023    ALKPHOS 136 (H) 10/16/2023    ALT 26 10/16/2023    AST 21 10/16/2023    CHOL 139 10/16/2023    TRIG 70 10/16/2023    HDL 81 (H) 10/16/2023    LDLCALC 44 10/16/2023    TSH 1.030 05/03/2023    HGBA1C 4.9 03/28/2023    MICROALBUR <0.5 10/16/2023           Signature:  BEE Sahu  [address] [place] [address]  Phone #: [phone]  Fax #: [phone]       Date of encounter: 12/13/23    Patient Instructions   Change protonix to Nexium 40 mg at bedtime  Medrol dose racheal for cough and bronchial inflammation  Nebulizer ordered with albuterol-ipratropium one vial every 6hours for cough  Return in 2 weeks for recheck  Expect improvement within 1 week

## 2023-12-13 NOTE — LETTER
December 13, 2023    Marcell Dickson  [address] [facility] [place] [address]             [facility]  Family Medicine  [address] [place] [address] [place] [address]  Phone: [phone]  Fax: [phone]   December 13, 2023     Patient: Marcell Dickson   YOB: 1970   Date of Visit: 12/13/2023       To Whom it May Concern:    Marcell Dickson was seen in my clinic on 12/13/2023. She may return to work on 12/14/2023 .    Please excuse her from any classes or work missed.    If you have any questions or concerns, please don't hesitate to call.    Sincerely,         Rena Garcia, FNP

## 2023-12-13 NOTE — PATIENT INSTRUCTIONS
Change protonix to Nexium 40 mg at bedtime  Medrol dose racheal for cough and bronchial inflammation  Nebulizer ordered with albuterol-ipratropium one vial every 6hours for cough  Return in 2 weeks for recheck  Expect improvement within 1 week

## 2023-12-21 ENCOUNTER — TELEPHONE (OUTPATIENT)
Dept: FAMILY MEDICINE | Facility: CLINIC | Age: 53
End: 2023-12-21
Payer: COMMERCIAL

## 2023-12-21 DIAGNOSIS — R91.1 PULMONARY NODULE: Primary | ICD-10-CM

## 2023-12-21 DIAGNOSIS — N28.1 RENAL CYST: ICD-10-CM

## 2023-12-21 NOTE — TELEPHONE ENCOUNTER
Contacted patient in regards to CT chest. Informed patient of lung nodules and renal cyst. Also informed patient of repeat CT chest and Renal ultrasound. Patient voiced understanding and had no further questions.     ----- Message from Virginia Beatty LPN sent at 12/21/2023 10:45 AM CST -----    ----- Message -----  From: Arlet Rodriguez MD  Sent: 12/20/2023   5:49 PM CST  To: Virginia Beatty LPN    Please call patient regarding imaging results.   CT chest reveals stable appearing pulmonary nodules. Radiology recommends repeat CT chest in 6 months - 1 year to ensure stability. Please place sticky note on patient's chart as a reminder and order as a future order to be scheduled in 6-12 months.   Incidentally, she has some renal cysts. Radiology recommends renal US as follow up imaging. Please order bilateral renal US and this can be scheduled now.  Thanks!

## 2024-01-05 ENCOUNTER — HOSPITAL ENCOUNTER (OUTPATIENT)
Dept: RADIOLOGY | Facility: HOSPITAL | Age: 54
Discharge: HOME | End: 2024-01-05
Attending: FAMILY MEDICINE
Payer: COMMERCIAL

## 2024-01-05 DIAGNOSIS — N28.1 RENAL CYST: ICD-10-CM

## 2024-01-05 PROCEDURE — 76770 US EXAM ABDO BACK WALL COMP: CPT | Mod: TC

## 2024-01-17 ENCOUNTER — TELEPHONE (OUTPATIENT)
Dept: FAMILY MEDICINE | Facility: CLINIC | Age: 54
End: 2024-01-17
Payer: COMMERCIAL

## 2024-01-17 DIAGNOSIS — K21.00 GASTROESOPHAGEAL REFLUX DISEASE WITH ESOPHAGITIS WITHOUT HEMORRHAGE: ICD-10-CM

## 2024-01-17 DIAGNOSIS — K44.9 HIATAL HERNIA: Chronic | ICD-10-CM

## 2024-01-17 RX ORDER — ESOMEPRAZOLE MAGNESIUM 40 MG/1
40 CAPSULE, DELAYED RELEASE ORAL
Qty: 90 CAPSULE | Refills: 1 | Status: SHIPPED | OUTPATIENT
Start: 2024-01-17 | End: 2024-02-20 | Stop reason: SDUPTHER

## 2024-01-17 NOTE — TELEPHONE ENCOUNTER
Contacted patient in regards to US. Informed patient of simple renal cysts and that it is stable. Patient voiced understanding and had no further questions.     ----- Message from Virginia Beatty LPN sent at 1/9/2024  3:16 PM CST -----    ----- Message -----  From: Arlet Rodriguez MD  Sent: 1/9/2024   1:25 PM CST  To: Virginia Beatty LPN    Please call patient regarding US results. Recent renal US reveals simple renal cysts that are similar in appearance to prior imaging with no complex features. This is stable. Thanks!

## 2024-01-29 PROBLEM — Z09 POSTOP CHECK: Status: RESOLVED | Noted: 2023-10-25 | Resolved: 2024-01-29

## 2024-01-31 ENCOUNTER — PATIENT OUTREACH (OUTPATIENT)
Dept: ADMINISTRATIVE | Facility: HOSPITAL | Age: 54
End: 2024-01-31

## 2024-01-31 NOTE — PROGRESS NOTES
Population Health Chart Review & Patient Outreach Details  Per [facility] website, insurance is active and pt is listed on the attributed list needing a healthy you performed in   Hy scheduled for 2024    Further Action Needed If Patient Returns Outreach:            Updates Requested / Reviewed:     []  Care Everywhere    []     []  External Sources ([facility], [facility], DIS, etc.)    [] [facility]   [] [facility]   [] Other:    []  Care Team Updated   []  Removed  or Duplicate Orders   []  Immunization Reconciliation Completed / Queried    [] [place]   [] [place]   [] [place]   [] [place]      Health Maintenance Topics Addressed and Outreach Outcomes / Actions Taken:             Breast Cancer Screening []  Mammogram Order Placed    []  Mammogram Screening Scheduled    []  External Records Requested & Care Team Updated if Applicable    []  External Records Uploaded & Care Team Updated if Applicable    []  Pt Declined Scheduling Mammogram    []  Pt Will Schedule with External Provider / Order Routed & Care Team Updated if Applicable              Cervical Cancer Screening []  Pap Smear Scheduled in Primary Care or OBGYN    []  External Records Requested & Care Team Updated if Applicable       []  External Records Uploaded, Care Team Updated, & History Updated if Applicable    []  Patient Declined Scheduling Pap Smear    []  Patient Will Schedule with External Provider & Care Team Updated if Applicable                  Colorectal Cancer Screening []  Colonoscopy Case Request / Referral / Home Test Order Placed    []  External Records Requested & Care Team Updated if Applicable    []  External Records Uploaded, Care Team Updated, & History Updated if Applicable    []  Patient Declined Completing Colon Cancer Screening    []  Patient Will Schedule with External Provider & Care Team Updated if Applicable    []  Fit Kit Mailed (add the SmartPhrase under additional notes)    []  Reminded Patient to Complete  Home Test                Diabetic Eye Exam []  Eye Exam Screening Order Placed    []  Eye Camera Scheduled or Optometry/Ophthalmology Referral Placed    []  External Records Requested & Care Team Updated if Applicable    []  External Records Uploaded, Care Team Updated, & History Updated if Applicable    []  Patient Declined Scheduling Eye Exam    []  Patient Will Schedule with External Provider & Care Team Updated if Applicable             Blood Pressure Control []  Primary Care Follow Up Visit Scheduled     []  Remote Blood Pressure Reading Captured    []  Patient Declined Remote Reading or Scheduling Appt - Escalated to PCP    []  Patient Will Call Back or Send Portal Message with Reading                 HbA1c & Other Labs []  Overdue Lab(s) Ordered    []  Overdue Lab(s) Scheduled    []  External Records Uploaded & Care Team Updated if Applicable    []  Primary Care Follow Up Visit Scheduled     []  Reminded Patient to Complete A1c Home Test    []  Patient Declined Scheduling Labs or Will Call Back to Schedule    []  Patient Will Schedule with External Provider / Order Routed, & Care Team Updated if Applicable           Primary Care Appointment []  Primary Care Appt Scheduled    []  Patient Declined Scheduling or Will Call Back to Schedule    []  Pt Established with External Provider, Updated Care Team, & Informed Pt to Notify Payor if Applicable           Medication Adherence /    Statin Use []  Primary Care Appointment Scheduled    []  Patient Reminded to  Prescription    []  Patient Declined, Provider Notified if Needed    []  Sent Provider Message to Review to Evaluate Pt for Statin, Add Exclusion Dx Codes, Document   Exclusion in Problem List, Change Statin Intensity Level to Moderate or High Intensity if Applicable                Osteoporosis Screening []  Dexa Order Placed    []  Dexa Appointment Scheduled    []  External Records Requested & Care Team Updated    []  External Records Uploaded, Care  Team Updated, & History Updated if Applicable    []  Patient Declined Scheduling Dexa or Will Call Back to Schedule    []  Patient Will Schedule with External Provider / Order Routed & Care Team Updated if Applicable       Additional Notes:

## 2024-02-19 DIAGNOSIS — Z12.11 COLON CANCER SCREENING: Primary | ICD-10-CM

## 2024-02-19 RX ORDER — POLYETHYLENE GLYCOL 3350, SODIUM SULFATE ANHYDROUS, SODIUM BICARBONATE, SODIUM CHLORIDE, POTASSIUM CHLORIDE 236; 22.74; 6.74; 5.86; 2.97 G/4L; G/4L; G/4L; G/4L; G/4L
4 POWDER, FOR SOLUTION ORAL ONCE
Qty: 4000 ML | Refills: 0 | Status: SHIPPED | OUTPATIENT
Start: 2024-02-19 | End: 2024-02-19

## 2024-02-20 ENCOUNTER — OFFICE VISIT (OUTPATIENT)
Dept: FAMILY MEDICINE | Facility: CLINIC | Age: 54
End: 2024-02-20
Payer: COMMERCIAL

## 2024-02-20 VITALS
BODY MASS INDEX: 39.37 KG/M2 | WEIGHT: 245 LBS | RESPIRATION RATE: 20 BRPM | HEART RATE: 70 BPM | DIASTOLIC BLOOD PRESSURE: 70 MMHG | HEIGHT: 66 IN | TEMPERATURE: 98 F | OXYGEN SATURATION: 98 % | SYSTOLIC BLOOD PRESSURE: 112 MMHG

## 2024-02-20 DIAGNOSIS — E66.01 CLASS 3 SEVERE OBESITY DUE TO EXCESS CALORIES WITH SERIOUS COMORBIDITY AND BODY MASS INDEX (BMI) OF 40.0 TO 44.9 IN ADULT: ICD-10-CM

## 2024-02-20 DIAGNOSIS — M46.1 BILATERAL SACROILIITIS: ICD-10-CM

## 2024-02-20 DIAGNOSIS — K44.9 HIATAL HERNIA: Chronic | ICD-10-CM

## 2024-02-20 DIAGNOSIS — K21.00 GASTROESOPHAGEAL REFLUX DISEASE WITH ESOPHAGITIS WITHOUT HEMORRHAGE: ICD-10-CM

## 2024-02-20 DIAGNOSIS — M89.49 OSTEOARTHROSIS MULTIPLE SITES, NOT SPECIFIED AS GENERALIZED: Chronic | ICD-10-CM

## 2024-02-20 DIAGNOSIS — J30.89 SEASONAL ALLERGIC RHINITIS DUE TO OTHER ALLERGIC TRIGGER: Chronic | ICD-10-CM

## 2024-02-20 DIAGNOSIS — R09.81 COUGH WITH CONGESTION OF PARANASAL SINUS: ICD-10-CM

## 2024-02-20 DIAGNOSIS — M54.17 LUMBOSACRAL RADICULOPATHY: Chronic | ICD-10-CM

## 2024-02-20 DIAGNOSIS — J40 BRONCHITIS: ICD-10-CM

## 2024-02-20 DIAGNOSIS — K59.09 CHRONIC CONSTIPATION: Chronic | ICD-10-CM

## 2024-02-20 DIAGNOSIS — F41.9 ANXIETY: ICD-10-CM

## 2024-02-20 DIAGNOSIS — R05.8 COUGH WITH CONGESTION OF PARANASAL SINUS: ICD-10-CM

## 2024-02-20 DIAGNOSIS — N89.8 VAGINAL IRRITATION: ICD-10-CM

## 2024-02-20 DIAGNOSIS — R60.0 LOCALIZED EDEMA: ICD-10-CM

## 2024-02-20 DIAGNOSIS — I10 PRIMARY HYPERTENSION: Primary | ICD-10-CM

## 2024-02-20 DIAGNOSIS — G47.09 OTHER INSOMNIA: Chronic | ICD-10-CM

## 2024-02-20 PROBLEM — E66.813 CLASS 3 SEVERE OBESITY DUE TO EXCESS CALORIES WITH SERIOUS COMORBIDITY AND BODY MASS INDEX (BMI) OF 40.0 TO 44.9 IN ADULT: Status: ACTIVE | Noted: 2024-02-20

## 2024-02-20 LAB
BILIRUB SERPL-MCNC: NORMAL MG/DL
BLOOD URINE, POC: NORMAL
COLOR, POC UA: YELLOW
GLUCOSE UR QL STRIP: NORMAL
KETONES UR QL STRIP: NORMAL
LEUKOCYTE ESTERASE URINE, POC: NORMAL
NITRITE, POC UA: NORMAL
PH, POC UA: 5.5
PROTEIN, POC: NORMAL
SPECIFIC GRAVITY, POC UA: 1.01
UROBILINOGEN, POC UA: 0.2

## 2024-02-20 PROCEDURE — 3008F BODY MASS INDEX DOCD: CPT | Mod: ,,, | Performed by: FAMILY MEDICINE

## 2024-02-20 PROCEDURE — 87480 CANDIDA DNA DIR PROBE: CPT | Mod: ,,, | Performed by: CLINICAL MEDICAL LABORATORY

## 2024-02-20 PROCEDURE — 99214 OFFICE O/P EST MOD 30 MIN: CPT | Mod: ,,, | Performed by: FAMILY MEDICINE

## 2024-02-20 PROCEDURE — 3044F HG A1C LEVEL LT 7.0%: CPT | Mod: ,,, | Performed by: FAMILY MEDICINE

## 2024-02-20 PROCEDURE — 1159F MED LIST DOCD IN RCRD: CPT | Mod: ,,, | Performed by: FAMILY MEDICINE

## 2024-02-20 PROCEDURE — 3074F SYST BP LT 130 MM HG: CPT | Mod: ,,, | Performed by: FAMILY MEDICINE

## 2024-02-20 PROCEDURE — 3078F DIAST BP <80 MM HG: CPT | Mod: ,,, | Performed by: FAMILY MEDICINE

## 2024-02-20 PROCEDURE — 81003 URINALYSIS AUTO W/O SCOPE: CPT | Mod: QW,,, | Performed by: FAMILY MEDICINE

## 2024-02-20 PROCEDURE — 87660 TRICHOMONAS VAGIN DIR PROBE: CPT | Mod: ,,, | Performed by: CLINICAL MEDICAL LABORATORY

## 2024-02-20 PROCEDURE — 87510 GARDNER VAG DNA DIR PROBE: CPT | Mod: ,,, | Performed by: CLINICAL MEDICAL LABORATORY

## 2024-02-20 RX ORDER — ALBUTEROL SULFATE 90 UG/1
AEROSOL, METERED RESPIRATORY (INHALATION)
Qty: 8.5 G | Refills: 0 | Status: SHIPPED | OUTPATIENT
Start: 2024-02-20 | End: 2024-03-05

## 2024-02-20 RX ORDER — CETIRIZINE HYDROCHLORIDE 10 MG/1
10 TABLET ORAL DAILY
Qty: 90 TABLET | Refills: 1 | Status: SHIPPED | OUTPATIENT
Start: 2024-02-20

## 2024-02-20 RX ORDER — HYDROXYZINE PAMOATE 50 MG/1
50 CAPSULE ORAL EVERY 8 HOURS PRN
Qty: 30 CAPSULE | Refills: 2 | Status: SHIPPED | OUTPATIENT
Start: 2024-02-20 | End: 2024-05-02 | Stop reason: SDUPTHER

## 2024-02-20 RX ORDER — ESOMEPRAZOLE MAGNESIUM 40 MG/1
40 CAPSULE, DELAYED RELEASE ORAL
Qty: 90 CAPSULE | Refills: 1 | Status: SHIPPED | OUTPATIENT
Start: 2024-02-20 | End: 2025-02-19

## 2024-02-20 RX ORDER — TRAZODONE HYDROCHLORIDE 100 MG/1
100 TABLET ORAL NIGHTLY
Qty: 90 TABLET | Refills: 1 | Status: SHIPPED | OUTPATIENT
Start: 2024-02-20 | End: 2024-04-23

## 2024-02-20 RX ORDER — PREGABALIN 100 MG/1
100 CAPSULE ORAL 2 TIMES DAILY
Qty: 60 CAPSULE | Refills: 2 | Status: SHIPPED | OUTPATIENT
Start: 2024-02-20

## 2024-02-20 RX ORDER — ACETAMINOPHEN AND CODEINE PHOSPHATE 300; 30 MG/1; MG/1
1 TABLET ORAL EVERY 4 HOURS PRN
COMMUNITY
Start: 2024-01-04 | End: 2024-05-02

## 2024-02-20 RX ORDER — HYDROCHLOROTHIAZIDE 25 MG/1
25 TABLET ORAL DAILY
Qty: 90 TABLET | Refills: 1 | Status: SHIPPED | OUTPATIENT
Start: 2024-02-20

## 2024-02-20 RX ORDER — FUROSEMIDE 20 MG/1
20 TABLET ORAL DAILY
Qty: 90 TABLET | Refills: 1 | Status: SHIPPED | OUTPATIENT
Start: 2024-02-20 | End: 2025-02-19

## 2024-02-20 RX ORDER — ESCITALOPRAM OXALATE 10 MG/1
10 TABLET ORAL DAILY
Qty: 60 TABLET | Refills: 0 | Status: SHIPPED | OUTPATIENT
Start: 2024-02-20 | End: 2024-04-18

## 2024-02-20 NOTE — PROGRESS NOTES
"Clinic Note    Patient Name: Marcell Dickson  : 1970  MRN: [id number]    Chief Complaint   Patient presents with    Follow-up     hypertension    Health Maintenance     Pneumococcal Vaccines (Age 0-64)(1 of 2 - PCV) Never done  HIV Screening Never done  TETANUS VACCINE Never done  COVID-19 Vaccine(2023-24 season) due on 2023  Mammogram due on 2024        HPI:    Ms. Marcell Dickson is a 53 y.o. female who presents to clinic today with CC of follow up on chronic disease processes including HTN, obesity, seasonal allergies, OA, chronic constipation, insomnia, GERD, and chronic back pain with bulging disc and lumbar radiculopathy.   Patient reports vaginal irritation/dysuria. States it is itching and feels irritated. Denies discharge.  Reports anxiety/depression. Denies suicidal/homicidal ideation. Reports she itches all over but states she feels like it is related to anxiety stating "my nerves are just bad". LFTs were normal on CMP in 2024.   Patient reports chronic issues are, otherwise,  well controlled on current medication regimen.  Denies problems or side effects with medications.  Patient is, otherwise, without complaints.     Medications:  Medication List with Changes/Refills   New Medications    ESCITALOPRAM OXALATE (LEXAPRO) 10 MG TABLET    Take 1 tablet (10 mg total) by mouth once daily.    HYDROXYZINE PAMOATE (VISTARIL) 50 MG CAP    Take 1 capsule (50 mg total) by mouth every 8 (eight) hours as needed (anxiety/itching).   Current Medications    ACETAMINOPHEN-CODEINE 300-30MG (TYLENOL #3) 300-30 MG TAB    Take 1 tablet by mouth every 4 (four) hours as needed. for pain.    ALBUTEROL-IPRATROPIUM (DUO-NEB) 2.5 MG-0.5 MG/3 ML NEBULIZER SOLUTION    Take 3 mLs by nebulization every 6 (six) hours as needed for Wheezing or Shortness of Breath (cough). Rescue   Changed and/or Refilled Medications    Modified Medication Previous Medication    ALBUTEROL " (PROVENTIL/VENTOLIN HFA) 90 MCG/ACTUATION INHALER albuterol (PROVENTIL/VENTOLIN HFA) 90 mcg/actuation inhaler       INHALE 2 PUFFS BY MOUTH EVERY 4 HOURS AS NEEDED FOR WHEEZING OR SHORTNESS OF BREATH    INHALE 2 PUFFS BY MOUTH EVERY 4 HOURS AS NEEDED FOR WHEEZING OR SHORTNESS OF BREATH    CETIRIZINE (ZYRTEC) 10 MG TABLET cetirizine (ZYRTEC) 10 MG tablet       Take 1 tablet (10 mg total) by mouth once daily.    TAKE 1 TABLET BY MOUTH EVERY DAY    ESOMEPRAZOLE (NEXIUM) 40 MG CAPSULE esomeprazole (NEXIUM) 40 MG capsule       Take 1 capsule (40 mg total) by mouth before breakfast.    Take 1 capsule (40 mg total) by mouth before breakfast.    FUROSEMIDE (LASIX) 20 MG TABLET furosemide (LASIX) 20 MG tablet       Take 1 tablet (20 mg total) by mouth once daily.    Take 1 tablet (20 mg total) by mouth once daily.    HYDROCHLOROTHIAZIDE (HYDRODIURIL) 25 MG TABLET hydroCHLOROthiazide (HYDRODIURIL) 25 MG tablet       Take 1 tablet (25 mg total) by mouth once daily.    Take 1 tablet (25 mg total) by mouth once daily.    LINACLOTIDE (LINZESS) 290 MCG CAP CAPSULE linaCLOtide (LINZESS) 290 mcg Cap capsule       Take 1 capsule (290 mcg total) by mouth before breakfast.    Take 1 capsule (290 mcg total) by mouth before breakfast.    PREGABALIN (LYRICA) 100 MG CAPSULE pregabalin (LYRICA) 100 MG capsule       Take 1 capsule (100 mg total) by mouth 2 (two) times daily.    Take 1 capsule (100 mg total) by mouth 2 (two) times daily.    TRAZODONE (DESYREL) 100 MG TABLET traZODone (DESYREL) 100 MG tablet       Take 1 tablet (100 mg total) by mouth every evening.    Take 1 tablet (100 mg total) by mouth every evening.   Discontinued Medications    HYDROXYZINE PAMOATE (VISTARIL) 25 MG CAP    TAKE 1 CAPSULE(25 MG) BY MOUTH EVERY 8 HOURS AS NEEDED FOR ITCHING OR ANXIETY        Allergies: Sulfa (sulfonamide antibiotics)      Past Medical History:    Past Medical History:   Diagnosis Date    Arthritis     GERD (gastroesophageal reflux disease)      History of rectal polyps 08/07/2018       Past Surgical History:    Past Surgical History:   Procedure Laterality Date    breast reduction      CARPAL TUNNEL RELEASE Right 9/28/2023    Procedure: RELEASE, CARPAL TUNNEL;  Surgeon: Hong Rodriguez MD;  Location: [facility];  Service: Orthopedics;  Laterality: Right;    CHOLECYSTECTOMY  10/28/2013    Dr. Paige    COLONOSCOPY  2018    DE QUERVAIN'S RELEASE Right 9/28/2023    Procedure: RELEASE, HAND, FOR DEQUERVAIN'S TENOSYNOVITIS;  Surgeon: Hong Rodriguez MD;  Location: [facility] OR;  Service: Orthopedics;  Laterality: Right;    EXCISION OF GANGLION OF WRIST Right 9/28/2023    Procedure: EXCISION, GANGLION CYST, WRIST;  Surgeon: Hong Rodriguez MD;  Location: [facility] OR;  Service: Orthopedics;  Laterality: Right;    gastric sleeve      HYSTERECTOMY  10/28/2013    Robotic, BSO,Cystoscopy, TOT-    OOPHORECTOMY      TUBAL LIGATION      tummy tuck           Social History:    Social History     Tobacco Use   Smoking Status Never    Passive exposure: Never   Smokeless Tobacco Never     Social History     Substance and Sexual Activity   Alcohol Use Yes    Comment: occasionally     Social History     Substance and Sexual Activity   Drug Use Never         Family History:    Family History   Problem Relation Age of Onset    Heart failure Mother     Heart disease Father     Colon cancer Sister     Stomach cancer Brother     Lung cancer Brother        Review of Systems:    Review of Systems   Constitutional:  Negative for appetite change, chills, fatigue, fever and unexpected weight change.   Eyes:  Negative for visual disturbance.   Respiratory:  Negative for cough and shortness of breath.    Cardiovascular:  Negative for chest pain and leg swelling.   Gastrointestinal:  Positive for abdominal pain. Negative for change in bowel habit, constipation, diarrhea, nausea and vomiting.        Reports intermittent constipation  Denies  "blood in stool  Scheduled for colonoscopy Monday   Genitourinary:  Positive for dysuria. Negative for vaginal discharge.        +vaginal itching/irritation   Musculoskeletal:  Positive for arthralgias and back pain.   Integumentary:  Negative for rash.   Neurological:  Negative for dizziness and headaches.   Psychiatric/Behavioral:  Positive for dysphoric mood and sleep disturbance. Negative for self-injury and suicidal ideas. The patient is nervous/anxious.         Vitals:    Vitals:    02/20/24 1546   BP: 112/70   BP Location: Right arm   Patient Position: Sitting   BP Method: Large (Manual)   Pulse: 70   Resp: 20   Temp: 98.1 °F (36.7 °C)   TempSrc: Oral   SpO2: 98%   Weight: 111.1 kg (245 lb)   Height: 5' 6" (1.676 m)       Body mass index is 39.54 kg/m².    Wt Readings from Last 3 Encounters:   02/20/24 1546 111.1 kg (245 lb)   12/13/23 1325 115.7 kg (255 lb)   11/25/23 0913 114.3 kg (252 lb)        Physical Exam:    Physical Exam  Constitutional:       General: She is not in acute distress.     Appearance: Normal appearance. She is obese.   HENT:      Nose: Nose normal.      Mouth/Throat:      Mouth: Mucous membranes are moist.      Pharynx: Oropharynx is clear.   Eyes:      Conjunctiva/sclera: Conjunctivae normal.   Cardiovascular:      Rate and Rhythm: Normal rate and regular rhythm.      Heart sounds: Normal heart sounds. No murmur heard.  Pulmonary:      Effort: Pulmonary effort is normal. No respiratory distress.      Breath sounds: Normal breath sounds. No wheezing, rhonchi or rales.   Abdominal:      General: Bowel sounds are normal.      Palpations: Abdomen is soft.      Tenderness: There is no abdominal tenderness.   Musculoskeletal:      Cervical back: Neck supple.      Right lower leg: No edema.      Left lower leg: No edema.   Skin:     Findings: No rash.   Neurological:      General: No focal deficit present.      Mental Status: She is alert. Mental status is at baseline.   Psychiatric:         " Mood and Affect: Mood normal.         Assessment/Plan:   1. Primary hypertension  -     hydroCHLOROthiazide (HYDRODIURIL) 25 MG tablet; Take 1 tablet (25 mg total) by mouth once daily.  Dispense: 90 tablet; Refill: 1    2. Hiatal hernia  -     esomeprazole (NEXIUM) 40 MG capsule; Take 1 capsule (40 mg total) by mouth before breakfast.  Dispense: 90 capsule; Refill: 1    3. Gastroesophageal reflux disease with esophagitis without hemorrhage  -     esomeprazole (NEXIUM) 40 MG capsule; Take 1 capsule (40 mg total) by mouth before breakfast.  Dispense: 90 capsule; Refill: 1    4. Lumbosacral radiculopathy  -     pregabalin (LYRICA) 100 MG capsule; Take 1 capsule (100 mg total) by mouth 2 (two) times daily.  Dispense: 60 capsule; Refill: 2    5. Osteoarthrosis multiple sites, not specified as generalized  -     pregabalin (LYRICA) 100 MG capsule; Take 1 capsule (100 mg total) by mouth 2 (two) times daily.  Dispense: 60 capsule; Refill: 2    6. Seasonal allergic rhinitis due to other allergic trigger  -     cetirizine (ZYRTEC) 10 MG tablet; Take 1 tablet (10 mg total) by mouth once daily.  Dispense: 90 tablet; Refill: 1    7. Other insomnia  -     traZODone (DESYREL) 100 MG tablet; Take 1 tablet (100 mg total) by mouth every evening.  Dispense: 90 tablet; Refill: 1    8. Chronic constipation  -     linaCLOtide (LINZESS) 290 mcg Cap capsule; Take 1 capsule (290 mcg total) by mouth before breakfast.  Dispense: 90 capsule; Refill: 1    9. Localized edema  -     furosemide (LASIX) 20 MG tablet; Take 1 tablet (20 mg total) by mouth once daily.  Dispense: 90 tablet; Refill: 1    10. Bronchitis  -     albuterol (PROVENTIL/VENTOLIN HFA) 90 mcg/actuation inhaler; INHALE 2 PUFFS BY MOUTH EVERY 4 HOURS AS NEEDED FOR WHEEZING OR SHORTNESS OF BREATH  Dispense: 8.5 g; Refill: 0    11. Cough with congestion of paranasal sinus  -     albuterol (PROVENTIL/VENTOLIN HFA) 90 mcg/actuation inhaler; INHALE 2 PUFFS BY MOUTH EVERY 4 HOURS AS  NEEDED FOR WHEEZING OR SHORTNESS OF BREATH  Dispense: 8.5 g; Refill: 0    12. Vaginal irritation  -     POCT URINALYSIS W/O SCOPE  -     Bacterial Vaginosis; Future; Expected date: 02/20/2024    13. Class 3 severe obesity due to excess calories with serious comorbidity and body mass index (BMI) of 40.0 to 44.9 in adult  - BMI discussed with patient.  - Diet and exercise  - Diet discussed and educational information provided  - Recommend 30 minutes of exercise at least 5 days per week.  - Follow up with Weight Management as scheduled.     14. Bilateral sacroiliitis  The current medical regimen is effective;  continue present plan and medications.    15. Anxiety  -     EScitalopram oxalate (LEXAPRO) 10 MG tablet; Take 1 tablet (10 mg total) by mouth once daily.  Dispense: 60 tablet; Refill: 0- new medication. Risks/benefits/potential side effects/black box warning reviewed and discussed with patient.   -     hydrOXYzine pamoate (VISTARIL) 50 MG Cap; Take 1 capsule (50 mg total) by mouth every 8 (eight) hours as needed (anxiety/itching).  Dispense: 30 capsule; Refill: 2 - dose increase          Active Problem List with Overview Notes    Diagnosis Date Noted    Primary hypertension 06/28/2022    Osteoarthrosis multiple sites, not specified as generalized 06/22/2023    Other insomnia 11/03/2022    Class 2 obesity due to excess calories without serious comorbidity with body mass index (BMI) of 36.0 to 36.9 in adult 11/03/2022    Arthritis 06/28/2022    Cervical radiculopathy 07/13/2023    Lumbosacral radiculopathy 06/22/2023    Bulging lumbar disc 05/03/2023    Gastroesophageal reflux disease with esophagitis without hemorrhage 11/03/2022    Chronic constipation 06/28/2022    Hiatal hernia 05/03/2023    Seasonal allergic rhinitis 06/28/2022    Class 3 severe obesity due to excess calories with serious comorbidity and body mass index (BMI) of 40.0 to 44.9 in adult 02/20/2024    Bilateral sacroiliitis 02/20/2024    S/P  excision of ganglion cyst 10/11/2023    De Quervain's tenosynovitis, right 09/27/2023    Carpal tunnel syndrome of right wrist 09/21/2023    Colon cancer screening 08/28/2023        Health Maintenance:  Health Maintenance   Topic Date Due    TETANUS VACCINE  Never done    Mammogram  03/03/2024    Colorectal Cancer Screening  08/28/2028    Lipid Panel  01/09/2029    Hepatitis C Screening  Completed    Shingles Vaccine  Completed   Mammogram - screening - [place] - scheduled for March    RTC in 6 weeks for follow up on anxiety medication changes.  RTC sooner if needed.   Patient voiced understanding and is agreeable to plan.      Aileen Rodriguez MD    Family Medicine

## 2024-02-21 ENCOUNTER — PATIENT OUTREACH (OUTPATIENT)
Dept: ADMINISTRATIVE | Facility: HOSPITAL | Age: 54
End: 2024-02-21

## 2024-02-21 LAB
CANDIDA SPECIES: NEGATIVE
GARDNERELLA: NEGATIVE
TRICHOMONAS: NEGATIVE

## 2024-02-21 NOTE — PROGRESS NOTES
Population Health Chart Review & Patient Outreach Details  Per [facility] website, insurance is active and pt is listed on the attributed list needing a healthy you performed in   Hy scheduled for 2024    Further Action Needed If Patient Returns Outreach:            Updates Requested / Reviewed:     []  Care Everywhere    []     []  External Sources ([facility], [facility], [facility], etc.)    [] [facility]   [] [facility]   [] Other:    []  Care Team Updated   []  Removed  or Duplicate Orders   []  Immunization Reconciliation Completed / Queried    [] [place]   [] [place]   [] [place]   [] [place]      Health Maintenance Topics Addressed and Outreach Outcomes / Actions Taken:             Breast Cancer Screening []  Mammogram Order Placed    []  Mammogram Screening Scheduled    []  External Records Requested & Care Team Updated if Applicable    []  External Records Uploaded & Care Team Updated if Applicable    []  Pt Declined Scheduling Mammogram    []  Pt Will Schedule with External Provider / Order Routed & Care Team Updated if Applicable              Cervical Cancer Screening []  Pap Smear Scheduled in Primary Care or OBGYN    []  External Records Requested & Care Team Updated if Applicable       []  External Records Uploaded, Care Team Updated, & History Updated if Applicable    []  Patient Declined Scheduling Pap Smear    []  Patient Will Schedule with External Provider & Care Team Updated if Applicable                  Colorectal Cancer Screening []  Colonoscopy Case Request / Referral / Home Test Order Placed    []  External Records Requested & Care Team Updated if Applicable    []  External Records Uploaded, Care Team Updated, & History Updated if Applicable    []  Patient Declined Completing Colon Cancer Screening    []  Patient Will Schedule with External Provider & Care Team Updated if Applicable    []  Fit Kit Mailed (add the SmartPhrase under additional notes)    []  Reminded Patient to Complete  Home Test                Diabetic Eye Exam []  Eye Exam Screening Order Placed    []  Eye Camera Scheduled or Optometry/Ophthalmology Referral Placed    []  External Records Requested & Care Team Updated if Applicable    []  External Records Uploaded, Care Team Updated, & History Updated if Applicable    []  Patient Declined Scheduling Eye Exam    []  Patient Will Schedule with External Provider & Care Team Updated if Applicable             Blood Pressure Control []  Primary Care Follow Up Visit Scheduled     []  Remote Blood Pressure Reading Captured    []  Patient Declined Remote Reading or Scheduling Appt - Escalated to PCP    []  Patient Will Call Back or Send Portal Message with Reading                 HbA1c & Other Labs []  Overdue Lab(s) Ordered    []  Overdue Lab(s) Scheduled    []  External Records Uploaded & Care Team Updated if Applicable    []  Primary Care Follow Up Visit Scheduled     []  Reminded Patient to Complete A1c Home Test    []  Patient Declined Scheduling Labs or Will Call Back to Schedule    []  Patient Will Schedule with External Provider / Order Routed, & Care Team Updated if Applicable           Primary Care Appointment []  Primary Care Appt Scheduled    []  Patient Declined Scheduling or Will Call Back to Schedule    []  Pt Established with External Provider, Updated Care Team, & Informed Pt to Notify Payor if Applicable           Medication Adherence /    Statin Use []  Primary Care Appointment Scheduled    []  Patient Reminded to  Prescription    []  Patient Declined, Provider Notified if Needed    []  Sent Provider Message to Review to Evaluate Pt for Statin, Add Exclusion Dx Codes, Document   Exclusion in Problem List, Change Statin Intensity Level to Moderate or High Intensity if Applicable                Osteoporosis Screening []  Dexa Order Placed    []  Dexa Appointment Scheduled    []  External Records Requested & Care Team Updated    []  External Records Uploaded, Care  Team Updated, & History Updated if Applicable    []  Patient Declined Scheduling Dexa or Will Call Back to Schedule    []  Patient Will Schedule with External Provider / Order Routed & Care Team Updated if Applicable       Additional Notes:

## 2024-02-26 ENCOUNTER — ANESTHESIA (OUTPATIENT)
Dept: GASTROENTEROLOGY | Facility: HOSPITAL | Age: 54
End: 2024-02-26
Payer: COMMERCIAL

## 2024-02-26 ENCOUNTER — ANESTHESIA EVENT (OUTPATIENT)
Dept: GASTROENTEROLOGY | Facility: HOSPITAL | Age: 54
End: 2024-02-26
Payer: COMMERCIAL

## 2024-02-26 ENCOUNTER — HOSPITAL ENCOUNTER (OUTPATIENT)
Dept: GASTROENTEROLOGY | Facility: HOSPITAL | Age: 54
Discharge: HOME | End: 2024-02-26
Attending: INTERNAL MEDICINE
Payer: COMMERCIAL

## 2024-02-26 VITALS
HEART RATE: 76 BPM | RESPIRATION RATE: 16 BRPM | BODY MASS INDEX: 39.37 KG/M2 | DIASTOLIC BLOOD PRESSURE: 65 MMHG | WEIGHT: 245 LBS | HEIGHT: 66 IN | OXYGEN SATURATION: 96 % | SYSTOLIC BLOOD PRESSURE: 109 MMHG | TEMPERATURE: 97 F

## 2024-02-26 DIAGNOSIS — Z12.11 COLON CANCER SCREENING: ICD-10-CM

## 2024-02-26 PROCEDURE — G0105 COLORECTAL SCRN; HI RISK IND: HCPCS | Performed by: INTERNAL MEDICINE

## 2024-02-26 PROCEDURE — 37000008 HC ANESTHESIA 1ST 15 MINUTES

## 2024-02-26 PROCEDURE — 27000284 HC CANNULA NASAL: Performed by: NURSE ANESTHETIST, CERTIFIED REGISTERED

## 2024-02-26 PROCEDURE — 63600175 PHARM REV CODE 636 W HCPCS: Performed by: NURSE ANESTHETIST, CERTIFIED REGISTERED

## 2024-02-26 PROCEDURE — D9220A PRA ANESTHESIA: Mod: ,,, | Performed by: NURSE ANESTHETIST, CERTIFIED REGISTERED

## 2024-02-26 PROCEDURE — 25000003 PHARM REV CODE 250: Performed by: NURSE ANESTHETIST, CERTIFIED REGISTERED

## 2024-02-26 PROCEDURE — G0105 COLORECTAL SCRN; HI RISK IND: HCPCS | Mod: ,,, | Performed by: INTERNAL MEDICINE

## 2024-02-26 PROCEDURE — 37000009 HC ANESTHESIA EA ADD 15 MINS

## 2024-02-26 RX ORDER — LIDOCAINE HYDROCHLORIDE 20 MG/ML
INJECTION, SOLUTION EPIDURAL; INFILTRATION; INTRACAUDAL; PERINEURAL
Status: DISCONTINUED | OUTPATIENT
Start: 2024-02-26 | End: 2024-02-26

## 2024-02-26 RX ORDER — SODIUM CHLORIDE 0.9 % (FLUSH) 0.9 %
10 SYRINGE (ML) INJECTION
Status: CANCELLED | OUTPATIENT
Start: 2024-02-26

## 2024-02-26 RX ORDER — PROPOFOL 10 MG/ML
VIAL (ML) INTRAVENOUS
Status: DISCONTINUED | OUTPATIENT
Start: 2024-02-26 | End: 2024-02-26

## 2024-02-26 RX ORDER — SODIUM CHLORIDE 9 MG/ML
INJECTION, SOLUTION INTRAVENOUS CONTINUOUS PRN
Status: DISCONTINUED | OUTPATIENT
Start: 2024-02-26 | End: 2024-02-26

## 2024-02-26 RX ADMIN — PROPOFOL 50 MG: 10 INJECTION, EMULSION INTRAVENOUS at 10:02

## 2024-02-26 RX ADMIN — SODIUM CHLORIDE: 9 INJECTION, SOLUTION INTRAVENOUS at 10:02

## 2024-02-26 RX ADMIN — LIDOCAINE HYDROCHLORIDE 50 MG: 20 INJECTION, SOLUTION INTRAVENOUS at 10:02

## 2024-02-26 NOTE — DISCHARGE INSTRUCTIONS
Procedure Date  2/26/24     Impression  Overall Impression:   The ileocecal valve, cecum, ascending colon, transverse colon, descending colon and sigmoid colon appeared normal.  (grade 2) hemorrhoids        Recommendation    Repeat screening colonoscopy in 10 years         Outcome of procedure: successful Colonoscopy  Disposition: patient to recovery following procedure; discharge to home when appropriate parameters met  Provisions for follow up: please call my office for any unexpected symptoms like chest or abdominal pain or bleeding following your procedure.  Final Diagnosis: personal h/o polyps        Indication  Colon cancer screening, Personal h/o colon polyps     NO DRIVING, OPERATING EQUIPMENT, OR SIGNING LEGAL DOCUMENTS FOR 24 HOURS.

## 2024-02-26 NOTE — TRANSFER OF CARE
"Anesthesia Transfer of Care Note    Patient: Marcell SimonRosaman    Procedure(s) Performed: * No procedures listed *    Patient location: GI    Anesthesia Type: general    Transport from OR: Transported from OR on room air with adequate spontaneous ventilation. Continuous ECG monitoring in transport. Continuous SpO2 monitoring in transport    Post pain: adequate analgesia    Post assessment: no apparent anesthetic complications    Post vital signs: stable    Level of consciousness: responds to stimulation    Nausea/Vomiting: no nausea/vomiting    Complications: none    Transfer of care protocol was followed      Last vitals: Visit Vitals  /65   Pulse 81   Temp 36.1 °C (97 °F)   Resp 17   Ht 5' 6" (1.676 m)   Wt 111.1 kg (245 lb)   SpO2 98%   Breastfeeding No   BMI 39.54 kg/m²     "

## 2024-02-26 NOTE — ANESTHESIA PREPROCEDURE EVALUATION
02/26/2024  Marcell TinocoHolaSamia is a 53 y.o., female.      Pre-op Assessment    I have reviewed the Patient Summary Reports.     I have reviewed the Nursing Notes. I have reviewed the NPO Status.   I have reviewed the Medications.     Review of Systems  Anesthesia Hx:  No problems with previous Anesthesia             Family Hx of Anesthesia complications:   Personal Hx of Anesthesia complications                    Social:  Non-Smoker       Hematology/Oncology:  Hematology Normal   Oncology Normal                                   EENT/Dental:  EENT/Dental Normal           Cardiovascular:     Hypertension                                  Hypertension         Pulmonary:  Pulmonary Normal                       Renal/:  Renal/ Normal                 Hepatic/GI:    Hiatal Hernia, GERD      Gerd    Hernia, Hiatal Hernia      Musculoskeletal:  Arthritis        Arthritis          Neurological:    Neuromuscular Disease,           Arthritis                         Neuromuscular Disease   Endocrine:        Obesity / BMI > 30  Dermatological:  Skin Normal    Psych:  Psychiatric Normal                    Physical Exam  General: Well nourished, Cooperative, Alert and Oriented    Airway:  Mallampati: II   Mouth Opening: Normal  TM Distance: Normal  Tongue: Normal  Neck ROM: Normal ROM    Chest/Lungs:  Clear to auscultation, Normal Respiratory Rate    Heart:  Rate: Normal  Rhythm: Regular Rhythm    Abdomen:  Normal, Soft        Anesthesia Plan  Type of Anesthesia, risks & benefits discussed:    Anesthesia Type: Gen Natural Airway  Intra-op Monitoring Plan: Standard ASA Monitors  Post Op Pain Control Plan: multimodal analgesia  Induction:  IV  Informed Consent: Informed consent signed with the Patient and all parties understand the risks and agree with anesthesia plan.  All questions answered. Patient consented to  blood products? Yes  ASA Score: 3    Ready For Surgery From Anesthesia Perspective.     .

## 2024-02-26 NOTE — ANESTHESIA POSTPROCEDURE EVALUATION
Anesthesia Post Evaluation    Patient: Marcell Dickson    Procedure(s) Performed: * No procedures listed *    Final Anesthesia Type: general      Patient location during evaluation: GI PACU  Patient participation: Yes- Able to Participate  Level of consciousness: awake and alert  Post-procedure vital signs: reviewed and stable  Pain management: adequate  Airway patency: patent  SHARONA mitigation strategies: Multimodal analgesia  PONV status at discharge: No PONV  Anesthetic complications: no      Cardiovascular status: blood pressure returned to baseline  Respiratory status: unassisted  Hydration status: euvolemic  Follow-up not needed.  Comments: Refer to nursing note for pain/juan francisco score upon discharge from recovery.              Vitals Value Taken Time   /76 02/26/24 1055   Temp 97f 02/26/24 1437   Pulse 64 02/26/24 1057   Resp 13 02/26/24 1057   SpO2 100 % 02/26/24 1057   Vitals shown include unvalidated device data.      Event Time   Out of Recovery 11:02:56         Pain/Juan Francisco Score: Juan Francisco Score: 10 (2/26/2024 10:31 AM)

## 2024-02-26 NOTE — H&P
Gastroenterology Pre-procedure H&P    Chief Complaint: Colon cancer screening    History of Present Illness    Marcell Dickson is a 53 y.o. female that  has a past medical history of Arthritis, GERD (gastroesophageal reflux disease), and History of rectal polyps (08/07/2018).     Patient here for routine surveillance. Colon in Aug 2023 with poor prep.     Patient denies wt loss, abdominal pain, diarrhea, melena/hematochezia, change in stool caliber, no anticoagulants, FMHx of GI related malignancies.      Past Medical History:   Diagnosis Date    Arthritis     GERD (gastroesophageal reflux disease)     History of rectal polyps 08/07/2018       Past Surgical History:   Procedure Laterality Date    breast reduction      CARPAL TUNNEL RELEASE Right 9/28/2023    Procedure: RELEASE, CARPAL TUNNEL;  Surgeon: Hong Rodriguez MD;  Location: [facility];  Service: Orthopedics;  Laterality: Right;    CHOLECYSTECTOMY  10/28/2013    Dr. Paige    COLONOSCOPY  2018    DE QUERVAIN'S RELEASE Right 9/28/2023    Procedure: RELEASE, HAND, FOR DEQUERVAIN'S TENOSYNOVITIS;  Surgeon: Hong Rodriguez MD;  Location: [facility] OR;  Service: Orthopedics;  Laterality: Right;    EXCISION OF GANGLION OF WRIST Right 9/28/2023    Procedure: EXCISION, GANGLION CYST, WRIST;  Surgeon: Hong Rodriguez MD;  Location: [facility];  Service: Orthopedics;  Laterality: Right;    gastric sleeve      HYSTERECTOMY  10/28/2013    Robotic, BSO,Cystoscopy, TOT-    OOPHORECTOMY      TUBAL LIGATION      tummy tuck         Family History   Problem Relation Age of Onset    Heart failure Mother     Heart disease Father     Colon cancer Sister     Stomach cancer Brother     Lung cancer Brother        Social History     Socioeconomic History    Marital status:      Spouse name: Guillermo    Number of children: 4   Tobacco Use    Smoking status: Never     Passive exposure: Never    Smokeless tobacco: Never    Substance and Sexual Activity    Alcohol use: Yes     Comment: occasionally    Drug use: Never    Sexual activity: Yes     Partners: Male       Current Outpatient Medications   Medication Sig Dispense Refill    acetaminophen-codeine 300-30mg (TYLENOL #3) 300-30 mg Tab Take 1 tablet by mouth every 4 (four) hours as needed. for pain.      albuterol (PROVENTIL/VENTOLIN HFA) 90 mcg/actuation inhaler INHALE 2 PUFFS BY MOUTH EVERY 4 HOURS AS NEEDED FOR WHEEZING OR SHORTNESS OF BREATH 8.5 g 0    albuterol-ipratropium (DUO-NEB) 2.5 mg-0.5 mg/3 mL nebulizer solution Take 3 mLs by nebulization every 6 (six) hours as needed for Wheezing or Shortness of Breath (cough). Rescue 95 mL 1    cetirizine (ZYRTEC) 10 MG tablet Take 1 tablet (10 mg total) by mouth once daily. 90 tablet 1    EScitalopram oxalate (LEXAPRO) 10 MG tablet Take 1 tablet (10 mg total) by mouth once daily. 60 tablet 0    esomeprazole (NEXIUM) 40 MG capsule Take 1 capsule (40 mg total) by mouth before breakfast. 90 capsule 1    furosemide (LASIX) 20 MG tablet Take 1 tablet (20 mg total) by mouth once daily. 90 tablet 1    hydroCHLOROthiazide (HYDRODIURIL) 25 MG tablet Take 1 tablet (25 mg total) by mouth once daily. 90 tablet 1    hydrOXYzine pamoate (VISTARIL) 50 MG Cap Take 1 capsule (50 mg total) by mouth every 8 (eight) hours as needed (anxiety/itching). 30 capsule 2    linaCLOtide (LINZESS) 290 mcg Cap capsule Take 1 capsule (290 mcg total) by mouth before breakfast. 90 capsule 1    pregabalin (LYRICA) 100 MG capsule Take 1 capsule (100 mg total) by mouth 2 (two) times daily. 60 capsule 2    traZODone (DESYREL) 100 MG tablet Take 1 tablet (100 mg total) by mouth every evening. 90 tablet 1     No current facility-administered medications for this encounter.       Review of patient's allergies indicates:   Allergen Reactions    Sulfa (sulfonamide antibiotics)        Objective:  There were no vitals filed for this visit.     GEN: normal appearing, NAD, AAO  x3  HENT: NCAT, anicteric, OP benign  CV: normal rate, regular rhythm  RESP: NABS, symmetric rise, unlabored  ABD: soft, ND, no guarding or TTP  SKIN: warm and dry  NEURO: grossly afocal    Assessment and Plan:    Proceed with:    Colonoscopy for previous adenomatous polyp, screening for colon cancer    Andreas Dia MD  Gastroenterology

## 2024-03-05 DIAGNOSIS — J40 BRONCHITIS: ICD-10-CM

## 2024-03-05 DIAGNOSIS — R05.8 COUGH WITH CONGESTION OF PARANASAL SINUS: ICD-10-CM

## 2024-03-05 DIAGNOSIS — R09.81 COUGH WITH CONGESTION OF PARANASAL SINUS: ICD-10-CM

## 2024-03-05 RX ORDER — ALBUTEROL SULFATE 90 UG/1
AEROSOL, METERED RESPIRATORY (INHALATION)
Qty: 8.5 G | Refills: 3 | Status: SHIPPED | OUTPATIENT
Start: 2024-03-05

## 2024-03-18 LAB — BCS RECOMMENDATION EXT: NORMAL

## 2024-03-19 ENCOUNTER — PATIENT MESSAGE (OUTPATIENT)
Dept: ADMINISTRATIVE | Facility: HOSPITAL | Age: 54
End: 2024-03-19

## 2024-04-18 DIAGNOSIS — F41.9 ANXIETY: ICD-10-CM

## 2024-04-18 RX ORDER — ESCITALOPRAM OXALATE 10 MG/1
10 TABLET ORAL
Qty: 90 TABLET | Refills: 0 | Status: SHIPPED | OUTPATIENT
Start: 2024-04-18

## 2024-04-23 DIAGNOSIS — G47.09 OTHER INSOMNIA: Chronic | ICD-10-CM

## 2024-04-23 RX ORDER — TRAZODONE HYDROCHLORIDE 100 MG/1
100 TABLET ORAL NIGHTLY
Qty: 90 TABLET | Refills: 1 | Status: SHIPPED | OUTPATIENT
Start: 2024-04-23

## 2024-05-02 ENCOUNTER — OFFICE VISIT (OUTPATIENT)
Dept: FAMILY MEDICINE | Facility: CLINIC | Age: 54
End: 2024-05-02
Payer: COMMERCIAL

## 2024-05-02 VITALS
RESPIRATION RATE: 18 BRPM | HEIGHT: 66 IN | OXYGEN SATURATION: 99 % | TEMPERATURE: 99 F | WEIGHT: 246.81 LBS | SYSTOLIC BLOOD PRESSURE: 104 MMHG | HEART RATE: 66 BPM | BODY MASS INDEX: 39.66 KG/M2 | DIASTOLIC BLOOD PRESSURE: 71 MMHG

## 2024-05-02 DIAGNOSIS — Z13.1 SCREENING FOR DIABETES MELLITUS: ICD-10-CM

## 2024-05-02 DIAGNOSIS — M89.49 OSTEOARTHROSIS MULTIPLE SITES, NOT SPECIFIED AS GENERALIZED: Chronic | ICD-10-CM

## 2024-05-02 DIAGNOSIS — Z00.01 ENCOUNTER FOR GENERAL ADULT MEDICAL EXAMINATION WITH ABNORMAL FINDINGS: Primary | ICD-10-CM

## 2024-05-02 DIAGNOSIS — M54.17 LUMBOSACRAL RADICULOPATHY: Chronic | ICD-10-CM

## 2024-05-02 DIAGNOSIS — F41.9 ANXIETY: ICD-10-CM

## 2024-05-02 DIAGNOSIS — K59.09 CHRONIC CONSTIPATION: Chronic | ICD-10-CM

## 2024-05-02 DIAGNOSIS — Z13.220 SCREENING FOR LIPOID DISORDERS: ICD-10-CM

## 2024-05-02 LAB
CHOLEST SERPL-MCNC: 147 MG/DL (ref 0–200)
CHOLEST/HDLC SERPL: 2.6 {RATIO}
GLUCOSE SERPL-MCNC: 77 MG/DL (ref 74–106)
HDLC SERPL-MCNC: 57 MG/DL (ref 40–60)
LDLC SERPL CALC-MCNC: 75 MG/DL
LDLC/HDLC SERPL: 1.3 {RATIO}
NONHDLC SERPL-MCNC: 90 MG/DL
TRIGL SERPL-MCNC: 75 MG/DL (ref 35–150)
VLDLC SERPL-MCNC: 15 MG/DL

## 2024-05-02 PROCEDURE — 3008F BODY MASS INDEX DOCD: CPT | Mod: ,,, | Performed by: NURSE PRACTITIONER

## 2024-05-02 PROCEDURE — 3078F DIAST BP <80 MM HG: CPT | Mod: ,,, | Performed by: NURSE PRACTITIONER

## 2024-05-02 PROCEDURE — 80061 LIPID PANEL: CPT | Mod: ,,, | Performed by: CLINICAL MEDICAL LABORATORY

## 2024-05-02 PROCEDURE — 3044F HG A1C LEVEL LT 7.0%: CPT | Mod: ,,, | Performed by: NURSE PRACTITIONER

## 2024-05-02 PROCEDURE — 82947 ASSAY GLUCOSE BLOOD QUANT: CPT | Mod: ,,, | Performed by: CLINICAL MEDICAL LABORATORY

## 2024-05-02 PROCEDURE — 1160F RVW MEDS BY RX/DR IN RCRD: CPT | Mod: ,,, | Performed by: NURSE PRACTITIONER

## 2024-05-02 PROCEDURE — 99396 PREV VISIT EST AGE 40-64: CPT | Mod: ,,, | Performed by: NURSE PRACTITIONER

## 2024-05-02 PROCEDURE — 1159F MED LIST DOCD IN RCRD: CPT | Mod: ,,, | Performed by: NURSE PRACTITIONER

## 2024-05-02 PROCEDURE — 3074F SYST BP LT 130 MM HG: CPT | Mod: ,,, | Performed by: NURSE PRACTITIONER

## 2024-05-02 RX ORDER — HYDROXYZINE PAMOATE 25 MG/1
CAPSULE ORAL
COMMUNITY
Start: 2024-03-05 | End: 2024-05-02 | Stop reason: SDUPTHER

## 2024-05-02 RX ORDER — PREGABALIN 100 MG/1
100 CAPSULE ORAL 2 TIMES DAILY
Qty: 60 CAPSULE | Refills: 2 | Status: CANCELLED | OUTPATIENT
Start: 2024-05-02

## 2024-05-02 RX ORDER — PHENTERMINE HYDROCHLORIDE 37.5 MG/1
37.5 TABLET ORAL EVERY MORNING
COMMUNITY
Start: 2024-03-11 | End: 2024-05-02

## 2024-05-02 RX ORDER — SEMAGLUTIDE 1 MG/.5ML
1 INJECTION, SOLUTION SUBCUTANEOUS
COMMUNITY
Start: 2024-04-23

## 2024-05-02 RX ORDER — HYDROCODONE BITARTRATE AND ACETAMINOPHEN 7.5; 325 MG/1; MG/1
1 TABLET ORAL EVERY 6 HOURS PRN
COMMUNITY
Start: 2024-01-11 | End: 2024-05-02

## 2024-05-02 RX ORDER — SEMAGLUTIDE 0.5 MG/.5ML
0.5 INJECTION, SOLUTION SUBCUTANEOUS
COMMUNITY
Start: 2024-04-01 | End: 2024-05-02

## 2024-05-02 RX ORDER — SEMAGLUTIDE 0.25 MG/.5ML
INJECTION, SOLUTION SUBCUTANEOUS
COMMUNITY
Start: 2024-03-12 | End: 2024-05-02

## 2024-05-02 NOTE — PROGRESS NOTES
"  Subjective     Patient ID: Marcell Dickson is a 53 y.o. female.    Chief Complaint: Healthy You (Patient is fasting for labs.), Back Pain (Complains of a "catch in the back." She states she has a bulging disc.), and Hot Flashes    Back Pain  Pertinent negatives include no abdominal pain, chest pain, dysuria, fever, headaches or weakness.     Review of Systems   Constitutional:  Negative for activity change, appetite change, chills, fatigue and fever.   HENT:  Negative for nasal congestion, ear pain, hearing loss, postnasal drip and sore throat.    Respiratory:  Negative for cough, chest tightness, shortness of breath and wheezing.    Cardiovascular:  Negative for chest pain, palpitations, leg swelling and claudication.   Gastrointestinal:  Negative for abdominal pain, change in bowel habit, constipation, diarrhea, nausea and vomiting.   Genitourinary:  Negative for dysuria.   Musculoskeletal:  Positive for back pain. Negative for arthralgias, gait problem and myalgias.   Integumentary:  Negative for rash.   Neurological:  Negative for weakness and headaches.   Psychiatric/Behavioral:  Negative for suicidal ideas. The patient is not nervous/anxious.        Tobacco Use: Low Risk  (5/4/2024)    Patient History     Smoking Tobacco Use: Never     Smokeless Tobacco Use: Never     Passive Exposure: Never     Review of patient's allergies indicates:   Allergen Reactions    Sulfa (sulfonamide antibiotics)      Current Outpatient Medications   Medication Instructions    albuterol (PROVENTIL/VENTOLIN HFA) 90 mcg/actuation inhaler INHALE 2 PUFFS BY MOUTH EVERY 4 HOURS AS NEEDED FOR WHEEZING OR SHORTNESS OF BREATH    albuterol-ipratropium (DUO-NEB) 2.5 mg-0.5 mg/3 mL nebulizer solution 3 mLs, Nebulization, Every 6 hours PRN, Rescue    cetirizine (ZYRTEC) 10 mg, Oral, Daily    EScitalopram oxalate (LEXAPRO) 10 mg, Oral    esomeprazole (NEXIUM) 40 mg, Oral, Before breakfast    furosemide (LASIX) 20 mg, Oral, Daily    " "hydroCHLOROthiazide (HYDRODIURIL) 25 mg, Oral, Daily    hydrOXYzine pamoate (VISTARIL) 50 mg, Oral, Every 8 hours PRN    linaCLOtide (LINZESS) 290 mcg, Oral, Before breakfast    pregabalin (LYRICA) 100 mg, Oral, 2 times daily    traZODone (DESYREL) 100 mg, Oral, Nightly    WEGOVY 1 mg, Subcutaneous, Every 7 days     Medications Discontinued During This Encounter   Medication Reason    acetaminophen-codeine 300-30mg (TYLENOL #3) 300-30 mg Tab Patient no longer taking    HYDROcodone-acetaminophen (NORCO) 7.5-325 mg per tablet Patient no longer taking    hydrOXYzine pamoate (VISTARIL) 25 MG Cap Duplicate Order    linaCLOtide (LINZESS) 290 mcg Cap capsule Patient no longer taking    phentermine (ADIPEX-P) 37.5 mg tablet Patient no longer taking    WEGOVY 0.25 mg/0.5 mL PnIj Patient no longer taking    WEGOVY 0.5 mg/0.5 mL PnIj Patient no longer taking    hydrOXYzine pamoate (VISTARIL) 50 MG Cap Reorder       Past Medical History:   Diagnosis Date    Arthritis     GERD (gastroesophageal reflux disease)     History of rectal polyps 08/07/2018     Health Maintenance Topics with due status: Not Due       Topic Last Completion Date    Cervical Cancer Screening 03/01/2023    Hemoglobin A1c (Diabetic Prevention Screening) 01/09/2024    Colorectal Cancer Screening 02/26/2024    Mammogram 03/18/2024    Lipid Panel 05/02/2024     Immunization History   Administered Date(s) Administered    COVID-19, MRNA, LN-S, PF (Pfizer) (Purple Cap) 02/23/2021, 03/16/2021, 10/22/2021    COVID-19, mRNA, LNP-S, bivalent booster, PF (PFIZER OMICRON) 12/21/2022    Influenza - Quadrivalent - PF *Preferred* (6 months and older) 10/16/2023    MMR 02/04/2020    Zoster Recombinant 08/02/2022, 10/05/2022       Objective     Body mass index is 39.83 kg/m².  Wt Readings from Last 3 Encounters:   05/02/24 111.9 kg (246 lb 12.8 oz)   02/26/24 111.1 kg (245 lb)   02/20/24 111.1 kg (245 lb)     Ht Readings from Last 3 Encounters:   05/02/24 5' 6" (1.676 m) " "  02/26/24 5' 6" (1.676 m)   02/20/24 5' 6" (1.676 m)     BP Readings from Last 3 Encounters:   05/02/24 104/71   02/26/24 109/65   02/20/24 112/70     Temp Readings from Last 3 Encounters:   05/02/24 98.6 °F (37 °C) (Oral)   02/26/24 97 °F (36.1 °C)   02/20/24 98.1 °F (36.7 °C) (Oral)     Pulse Readings from Last 3 Encounters:   05/02/24 66   02/26/24 76   02/20/24 70     Resp Readings from Last 3 Encounters:   05/02/24 18   02/26/24 16   02/20/24 20     PF Readings from Last 3 Encounters:   03/01/23 71 L/min       Physical Exam  Vitals and nursing note reviewed.   Constitutional:       General: She is not in acute distress.     Appearance: Normal appearance. She is not ill-appearing.   Eyes:      Extraocular Movements: Extraocular movements intact.      Pupils: Pupils are equal, round, and reactive to light.   Cardiovascular:      Rate and Rhythm: Normal rate and regular rhythm.      Heart sounds: Normal heart sounds.   Pulmonary:      Effort: Pulmonary effort is normal.      Breath sounds: Normal breath sounds.   Abdominal:      General: Bowel sounds are normal.      Palpations: Abdomen is soft.   Musculoskeletal:         General: Normal range of motion.   Skin:     Findings: No rash.   Neurological:      General: No focal deficit present.      Mental Status: She is alert and oriented to person, place, and time. Mental status is at baseline.   Psychiatric:         Mood and Affect: Mood normal.         Behavior: Behavior normal.       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 as generalized (Chronic)     Other Visit Diagnoses       Encounter for general adult medical examination with abnormal findings    -  Primary    Screening for diabetes mellitus        Relevant " Orders    Glucose, Fasting (Completed)    Screening for lipoid disorders        Relevant Orders    Lipid Panel (Completed)    Anxiety        Relevant Medications    hydrOXYzine pamoate (VISTARIL) 50 MG Cap              Plan: [facility] Healthy You Wellness Plan    Overall Health Goal:   Healthy Lifestyle Choices  Improve Overall health  Weight Loss    Biometrics  Attend Regularly scheduled office visits  Monitor blood pressure and keep a log     Lifestyle  Schedule Yearly Mammogram  Schedule colonoscopy  Take medications as prescribed  Develop a good social support system    Nutrition  Avoiding adding table salt to food  Recommend weight loss  Eat well balanced meals  Decrease intake of fast foods    Exercise  Recommend physical activity for at least 30 minutes, 5 days per week        I have reviewed the medications, allergies, and problem list.     Goal Actions:    What type of visit is the patient here for today?: Healthy You  Does the patient consent to enroll in [facility] Healthy?: Yes  Is this a Wellness Follow Up?: No  What is your overall wellness goal? (select at least one): Improve overall health  Choose 3: Lifestyle, Exercise, Nutrition  Lifestyle Actions : Develop good support system  Nurtrition Actions: Eat a well-balanced diet, drink 8-10 glasses of water per day, Avoid carbonated beverages  Exercise Actions: Recommend physical activity 30 minutes per day 3-5 times/week

## 2024-05-02 NOTE — LETTER
AUTHORIZATION FOR RELEASE OF   CONFIDENTIAL INFORMATION    Dear [facility],    We are seeing Marcell Dickson, date of birth 1970, in the clinic at [facility]. Marcell Dickson has an outstanding lab/procedure at the time we reviewed her chart. In order to help keep her health information updated, she has authorized us to request the following medical record(s):        ( x )  MAMMOGRAM                                      (  )  COLONOSCOPY      (  )  PAP SMEAR                                          (  )  OUTSIDE LAB RESULTS     (  )  DEXA SCAN                                          (  )  EYE EXAM            (  )  FOOT EXAM                                          (  )  ENTIRE RECORD     (  )  OUTSIDE IMMUNIZATIONS                 (  )  _______________         Please fax records to [facility], Betsy Mann, DNP, FNP-C, [phone]     If you have any questions, please contact Olya at [phone].           Patient Name: Marcell Dickson  : 1970  Patient Phone #: [phone]

## 2024-05-03 ENCOUNTER — PATIENT OUTREACH (OUTPATIENT)
Dept: ADMINISTRATIVE | Facility: HOSPITAL | Age: 54
End: 2024-05-03

## 2024-05-03 NOTE — PROGRESS NOTES
Population Health Chart Review & Patient Outreach Details      Further Action Needed If Patient Returns Outreach:            Updates Requested / Reviewed:     []  Care Everywhere    []     []  External Sources ([facility], [facility], [facility], etc.)    [] [facility]   [] [facility]   [] Other:    []  Care Team Updated   []  Removed  or Duplicate Orders   []  Immunization Reconciliation Completed / Queried    [] [place]   [] [place]   [] [place]   [] [place]      Health Maintenance Topics Addressed and Outreach Outcomes / Actions Taken:             Breast Cancer Screening []  Mammogram Order Placed    []  Mammogram Screening Scheduled    []  External Records Requested & Care Team Updated if Applicable    []  External Records Uploaded & Care Team Updated if Applicable    []  Pt Declined Scheduling Mammogram    []  Pt Will Schedule with External Provider / Order Routed & Care Team Updated if Applicable              Cervical Cancer Screening []  Pap Smear Scheduled in Primary Care or OBGYN    []  External Records Requested & Care Team Updated if Applicable       []  External Records Uploaded, Care Team Updated, & History Updated if Applicable    []  Patient Declined Scheduling Pap Smear    []  Patient Will Schedule with External Provider & Care Team Updated if Applicable                  Colorectal Cancer Screening []  Colonoscopy Case Request / Referral / Home Test Order Placed    []  External Records Requested & Care Team Updated if Applicable    []  External Records Uploaded, Care Team Updated, & History Updated if Applicable    []  Patient Declined Completing Colon Cancer Screening    []  Patient Will Schedule with External Provider & Care Team Updated if Applicable    []  Fit Kit Mailed (add the SmartPhrase under additional notes)    []  Reminded Patient to Complete Home Test                Diabetic Eye Exam []  Eye Exam Screening Order Placed    []  Eye Camera Scheduled or Optometry/Ophthalmology Referral  Placed    []  External Records Requested & Care Team Updated if Applicable    []  External Records Uploaded, Care Team Updated, & History Updated if Applicable    []  Patient Declined Scheduling Eye Exam    []  Patient Will Schedule with External Provider & Care Team Updated if Applicable             Blood Pressure Control []  Primary Care Follow Up Visit Scheduled     []  Remote Blood Pressure Reading Captured    []  Patient Declined Remote Reading or Scheduling Appt - Escalated to PCP    []  Patient Will Call Back or Send Portal Message with Reading                 HbA1c & Other Labs []  Overdue Lab(s) Ordered    []  Overdue Lab(s) Scheduled    []  External Records Uploaded & Care Team Updated if Applicable    []  Primary Care Follow Up Visit Scheduled     []  Reminded Patient to Complete A1c Home Test    []  Patient Declined Scheduling Labs or Will Call Back to Schedule    []  Patient Will Schedule with External Provider / Order Routed, & Care Team Updated if Applicable           Primary Care Appointment []  Primary Care Appt Scheduled    []  Patient Declined Scheduling or Will Call Back to Schedule    []  Pt Established with External Provider, Updated Care Team, & Informed Pt to Notify Payor if Applicable           Medication Adherence /    Statin Use []  Primary Care Appointment Scheduled    []  Patient Reminded to  Prescription    []  Patient Declined, Provider Notified if Needed    []  Sent Provider Message to Review to Evaluate Pt for Statin, Add Exclusion Dx Codes, Document   Exclusion in Problem List, Change Statin Intensity Level to Moderate or High Intensity if Applicable                Osteoporosis Screening []  Dexa Order Placed    []  Dexa Appointment Scheduled    []  External Records Requested & Care Team Updated    []  External Records Uploaded, Care Team Updated, & History Updated if Applicable    []  Patient Declined Scheduling Dexa or Will Call Back to Schedule    []  Patient Will Schedule  with External Provider / Order Routed & Care Team Updated if Applicable       Additional Notes:.  Post visit Population Health review of encounter with date of service  5/2/24 with Salomón.  Not all required HY components in encounter.  Needs cpt ,z00.00 or z00.01 as primary dx , and wellness plan chart is opened.Message sent for wellness plan to provider's staff.  Followup appt for: 5/6/25HY

## 2024-05-04 RX ORDER — HYDROXYZINE PAMOATE 50 MG/1
50 CAPSULE ORAL EVERY 8 HOURS PRN
Qty: 30 CAPSULE | Refills: 2 | Status: SHIPPED | OUTPATIENT
Start: 2024-05-04

## 2024-05-08 ENCOUNTER — OFFICE VISIT (OUTPATIENT)
Dept: ORTHOPEDICS | Facility: CLINIC | Age: 54
End: 2024-05-08
Payer: COMMERCIAL

## 2024-05-08 DIAGNOSIS — G56.02 CARPAL TUNNEL SYNDROME, LEFT: Primary | ICD-10-CM

## 2024-05-08 PROCEDURE — 20526 THER INJECTION CARP TUNNEL: CPT | Mod: PBBFAC,LT | Performed by: ORTHOPAEDIC SURGERY

## 2024-05-08 PROCEDURE — 99213 OFFICE O/P EST LOW 20 MIN: CPT | Mod: PBBFAC,25 | Performed by: ORTHOPAEDIC SURGERY

## 2024-05-08 NOTE — PROGRESS NOTES
CLINIC NOTE       Chief Complaint   Patient presents with    Right Hand - Post-op Evaluation    Left Hand - Follow-up        Marcell TinocoHolaSamia is a 53 y.o. female seen today for recheck of her left wrist.  She is known to me having undergone right carpal tunnel surgical release 09/28/2023.  She has had good relief of symptoms on the right side.  She developed left carpal tunnel symptoms and underwent carpal tunnel steroid injection 09/27/2023.  She had good control of symptoms until recently.  She was today requesting repeat steroid injection left wrist.  She indicates she will likely want to proceed with carpal tunnel surgical release in the future if improvement is short-lived.      Past Medical History:   Diagnosis Date    Arthritis     GERD (gastroesophageal reflux disease)     History of rectal polyps 08/07/2018     Family History   Problem Relation Name Age of Onset    Heart failure Mother      Heart disease Father      Colon cancer Sister      Stomach cancer Brother Joel     Lung cancer Brother Joel      Current Outpatient Medications on File Prior to Visit   Medication Sig Dispense Refill    albuterol (PROVENTIL/VENTOLIN HFA) 90 mcg/actuation inhaler INHALE 2 PUFFS BY MOUTH EVERY 4 HOURS AS NEEDED FOR WHEEZING OR SHORTNESS OF BREATH 8.5 g 3    albuterol-ipratropium (DUO-NEB) 2.5 mg-0.5 mg/3 mL nebulizer solution Take 3 mLs by nebulization every 6 (six) hours as needed for Wheezing or Shortness of Breath (cough). Rescue 95 mL 1    cetirizine (ZYRTEC) 10 MG tablet Take 1 tablet (10 mg total) by mouth once daily. 90 tablet 1    EScitalopram oxalate (LEXAPRO) 10 MG tablet TAKE 1 TABLET(10 MG) BY MOUTH DAILY 90 tablet 0    esomeprazole (NEXIUM) 40 MG capsule Take 1 capsule (40 mg total) by mouth before breakfast. 90 capsule 1    furosemide (LASIX) 20 MG tablet Take 1 tablet (20 mg total) by mouth once daily. 90 tablet 1    hydroCHLOROthiazide (HYDRODIURIL) 25 MG tablet Take 1 tablet (25 mg  total) by mouth once daily. 90 tablet 1    hydrOXYzine pamoate (VISTARIL) 50 MG Cap Take 1 capsule (50 mg total) by mouth every 8 (eight) hours as needed (anxiety/itching). 30 capsule 2    linaCLOtide (LINZESS) 290 mcg Cap capsule Take 1 capsule (290 mcg total) by mouth before breakfast. 90 capsule 1    pregabalin (LYRICA) 100 MG capsule Take 1 capsule (100 mg total) by mouth 2 (two) times daily. 60 capsule 2    traZODone (DESYREL) 100 MG tablet TAKE 1 TABLET(100 MG) BY MOUTH EVERY EVENING 90 tablet 1    WEGOVY 1 mg/0.5 mL PnIj Inject 1 mg into the skin every 7 days.       No current facility-administered medications on file prior to visit.       ROS     There were no vitals filed for this visit.    Past Surgical History:   Procedure Laterality Date    breast reduction      CARPAL TUNNEL RELEASE Right 9/28/2023    Procedure: RELEASE, CARPAL TUNNEL;  Surgeon: Hong Rodriguez MD;  Location: [facility] OR;  Service: Orthopedics;  Laterality: Right;    CHOLECYSTECTOMY  10/28/2013    Dr. Paige    COLONOSCOPY  2018    DE QUERVAIN'S RELEASE Right 9/28/2023    Procedure: RELEASE, HAND, FOR DEQUERVAIN'S TENOSYNOVITIS;  Surgeon: Hong Rodriguez MD;  Location: [facility] OR;  Service: Orthopedics;  Laterality: Right;    EXCISION OF GANGLION OF WRIST Right 9/28/2023    Procedure: EXCISION, GANGLION CYST, WRIST;  Surgeon: Hong Rodriguez MD;  Location: [facility] OR;  Service: Orthopedics;  Laterality: Right;    gastric sleeve      HYSTERECTOMY  10/28/2013    Robotic, BSO,Cystoscopy, TOT-    OOPHORECTOMY      TUBAL LIGATION      tummy tuck          Review of patient's allergies indicates:   Allergen Reactions    Sulfa (sulfonamide antibiotics)         Ortho Exam :  Left wrist is normal contour.  Positive Phalen's and Tinel's test.  Radiographic Examination:    Technique:    Findings:    Impression:   See Above    Assessment and Plan  Patient Active Problem List    Diagnosis Date Noted     Class 3 severe obesity due to excess calories with serious comorbidity and body mass index (BMI) of 40.0 to 44.9 in adult 02/20/2024    Bilateral sacroiliitis 02/20/2024    S/P excision of ganglion cyst 10/11/2023    De Quervain's tenosynovitis, right 09/27/2023    Carpal tunnel syndrome of right wrist 09/21/2023    Colon cancer screening 08/28/2023    Cervical radiculopathy 07/13/2023    Lumbosacral radiculopathy 06/22/2023    Osteoarthrosis multiple sites, not specified as generalized 06/22/2023    Bulging lumbar disc 05/03/2023    Hiatal hernia 05/03/2023    Other insomnia 11/03/2022    Gastroesophageal reflux disease with esophagitis without hemorrhage 11/03/2022    Class 2 obesity due to excess calories without serious comorbidity with body mass index (BMI) of 36.0 to 36.9 in adult 11/03/2022    Seasonal allergic rhinitis 06/28/2022    Primary hypertension 06/28/2022    Arthritis 06/28/2022    Chronic constipation 06/28/2022    Impression:  Left carpal tunnel syndrome   Plan:  Left wrist prepped Betadine carpal tunnel steroid injection performed with triamcinolone and Marcaine 1 cc each.  Recheck if symptoms persist or worsen.      Hong Rodriguez M.D.

## 2024-06-17 ENCOUNTER — OFFICE VISIT (OUTPATIENT)
Dept: FAMILY MEDICINE | Facility: CLINIC | Age: 54
End: 2024-06-17
Payer: COMMERCIAL

## 2024-06-17 VITALS
RESPIRATION RATE: 18 BRPM | BODY MASS INDEX: 39.82 KG/M2 | HEART RATE: 62 BPM | HEIGHT: 66 IN | OXYGEN SATURATION: 100 % | DIASTOLIC BLOOD PRESSURE: 83 MMHG | SYSTOLIC BLOOD PRESSURE: 119 MMHG | TEMPERATURE: 99 F | WEIGHT: 247.81 LBS

## 2024-06-17 DIAGNOSIS — N89.8 VAGINAL ITCHING: Primary | ICD-10-CM

## 2024-06-17 LAB
BILIRUB SERPL-MCNC: NORMAL MG/DL
BLOOD URINE, POC: NORMAL
COLOR, POC UA: YELLOW
GLUCOSE UR QL STRIP: NORMAL
KETONES UR QL STRIP: NORMAL
LEUKOCYTE ESTERASE URINE, POC: NORMAL
NITRITE, POC UA: NORMAL
PH, POC UA: 6
PROTEIN, POC: NORMAL
SPECIFIC GRAVITY, POC UA: 1.03
UROBILINOGEN, POC UA: 1

## 2024-06-17 PROCEDURE — 99214 OFFICE O/P EST MOD 30 MIN: CPT | Mod: ,,, | Performed by: NURSE PRACTITIONER

## 2024-06-17 PROCEDURE — 3044F HG A1C LEVEL LT 7.0%: CPT | Mod: ,,, | Performed by: NURSE PRACTITIONER

## 2024-06-17 PROCEDURE — 3079F DIAST BP 80-89 MM HG: CPT | Mod: ,,, | Performed by: NURSE PRACTITIONER

## 2024-06-17 PROCEDURE — 81003 URINALYSIS AUTO W/O SCOPE: CPT | Mod: QW,,, | Performed by: NURSE PRACTITIONER

## 2024-06-17 PROCEDURE — 1160F RVW MEDS BY RX/DR IN RCRD: CPT | Mod: ,,, | Performed by: NURSE PRACTITIONER

## 2024-06-17 PROCEDURE — 3074F SYST BP LT 130 MM HG: CPT | Mod: ,,, | Performed by: NURSE PRACTITIONER

## 2024-06-17 PROCEDURE — 1159F MED LIST DOCD IN RCRD: CPT | Mod: ,,, | Performed by: NURSE PRACTITIONER

## 2024-06-17 PROCEDURE — 3008F BODY MASS INDEX DOCD: CPT | Mod: ,,, | Performed by: NURSE PRACTITIONER

## 2024-06-17 RX ORDER — NYSTATIN AND TRIAMCINOLONE ACETONIDE 100000; 1 [USP'U]/G; MG/G
CREAM TOPICAL 2 TIMES DAILY
Qty: 15 G | Refills: 0 | Status: SHIPPED | OUTPATIENT
Start: 2024-06-17

## 2024-06-17 RX ORDER — PROGESTERONE 100 MG/1
100 CAPSULE ORAL NIGHTLY
COMMUNITY
Start: 2024-06-04

## 2024-06-17 NOTE — PATIENT INSTRUCTIONS
Stop progesterone. Contact prescriber. Use Mycolog to external genitalia. Follow up with primary care provider.

## 2024-06-17 NOTE — PROGRESS NOTES
Chela Lorenzana DNP, BEE    [address] [place] [address]     PATIENT NAME: Marcell Dickson  : 1970  DATE: 24  MRN: [id number]      Billing Provider: Chela Lorenzana DNP, FNP  Level of Service:   Patient PCP Information       Provider PCP Type    Arlet Rodriguez MD General            Reason for Visit / Chief Complaint: Vaginal Itching (Started a couple weeks ago. Started after being prescribed Progesterone. Denies odor or discharge.)       Update PCP  Update Chief Complaint         History of Present Illness / Problem Focused Workflow     Marcell Dickson presents to the clinic with Vaginal Itching (Started a couple weeks ago. Started after being prescribed Progesterone. Denies odor or discharge.)     Vaginal Itching  Pertinent negatives include no abdominal pain, back pain, chills, constipation, diarrhea, dysuria, fever, headaches, nausea, rash, sore throat or vomiting.       Review of Systems     Review of Systems   Constitutional:  Negative for activity change, appetite change, chills, fatigue and fever.   HENT:  Negative for nasal congestion, ear pain, hearing loss, postnasal drip and sore throat.    Respiratory:  Negative for cough, chest tightness, shortness of breath and wheezing.    Cardiovascular:  Negative for chest pain, palpitations, leg swelling and claudication.   Gastrointestinal:  Negative for abdominal pain, change in bowel habit, constipation, diarrhea, nausea and vomiting.   Genitourinary:  Negative for dysuria.   Musculoskeletal:  Negative for arthralgias, back pain, gait problem and myalgias.   Integumentary:  Negative for rash.   Neurological:  Negative for weakness and headaches.   Psychiatric/Behavioral:  Negative for suicidal ideas. The patient is not nervous/anxious.         Medical / Social / Family History     Past Medical History:   Diagnosis Date    Arthritis     GERD (gastroesophageal reflux disease)      History of rectal polyps 08/07/2018       Past Surgical History:   Procedure Laterality Date    breast reduction      CARPAL TUNNEL RELEASE Right 9/28/2023    Procedure: RELEASE, CARPAL TUNNEL;  Surgeon: Hong Rodriguez MD;  Location: [facility] OR;  Service: Orthopedics;  Laterality: Right;    CHOLECYSTECTOMY  10/28/2013    Dr. Paige    COLONOSCOPY  2018    DE QUERVAIN'S RELEASE Right 9/28/2023    Procedure: RELEASE, HAND, FOR DEQUERVAIN'S TENOSYNOVITIS;  Surgeon: Hong Rodriguez MD;  Location: [facility] OR;  Service: Orthopedics;  Laterality: Right;    EXCISION OF GANGLION OF WRIST Right 9/28/2023    Procedure: EXCISION, GANGLION CYST, WRIST;  Surgeon: Hong Rodriguez MD;  Location: [facility] OR;  Service: Orthopedics;  Laterality: Right;    gastric sleeve      HYSTERECTOMY  10/28/2013    Robotic, BSO,Cystoscopy, TOT-    OOPHORECTOMY      TUBAL LIGATION      tummy tuck         Social History  Ms. Marcell Dickson  reports that she has never smoked. She has never been exposed to tobacco smoke. She has never used smokeless tobacco. She reports current alcohol use. She reports that she does not use drugs.    Family History  Ms. Marcell Dickson's family history includes Colon cancer in her sister; Heart disease in her father; Heart failure in her mother; Lung cancer in her brother; Stomach cancer in her brother.    Medications and Allergies     Medications  Outpatient Medications Marked as Taking for the 6/17/24 encounter (Office Visit) with Chela Lorenzana, CANDIDA, FNP   Medication Sig Dispense Refill    albuterol (PROVENTIL/VENTOLIN HFA) 90 mcg/actuation inhaler INHALE 2 PUFFS BY MOUTH EVERY 4 HOURS AS NEEDED FOR WHEEZING OR SHORTNESS OF BREATH 8.5 g 3    albuterol-ipratropium (DUO-NEB) 2.5 mg-0.5 mg/3 mL nebulizer solution Take 3 mLs by nebulization every 6 (six) hours as needed for Wheezing or Shortness of Breath (cough). Rescue 95 mL 1    cetirizine  (ZYRTEC) 10 MG tablet Take 1 tablet (10 mg total) by mouth once daily. 90 tablet 1    EScitalopram oxalate (LEXAPRO) 10 MG tablet TAKE 1 TABLET(10 MG) BY MOUTH DAILY 90 tablet 0    esomeprazole (NEXIUM) 40 MG capsule Take 1 capsule (40 mg total) by mouth before breakfast. 90 capsule 1    furosemide (LASIX) 20 MG tablet Take 1 tablet (20 mg total) by mouth once daily. 90 tablet 1    hydroCHLOROthiazide (HYDRODIURIL) 25 MG tablet Take 1 tablet (25 mg total) by mouth once daily. 90 tablet 1    hydrOXYzine pamoate (VISTARIL) 50 MG Cap Take 1 capsule (50 mg total) by mouth every 8 (eight) hours as needed (anxiety/itching). 30 capsule 2    Lactobacillus rhamnosus GG (CULTURELLE) 10 billion cell capsule Take 1 capsule by mouth once daily.      linaCLOtide (LINZESS) 290 mcg Cap capsule Take 1 capsule (290 mcg total) by mouth before breakfast. 90 capsule 1    pregabalin (LYRICA) 100 MG capsule Take 1 capsule (100 mg total) by mouth 2 (two) times daily. 60 capsule 2    progesterone (PROMETRIUM) 100 MG capsule Take 100 mg by mouth every evening.      traZODone (DESYREL) 100 MG tablet TAKE 1 TABLET(100 MG) BY MOUTH EVERY EVENING 90 tablet 1    WEGOVY 1 mg/0.5 mL PnIj Inject 1 mg into the skin every 7 days.       Current Facility-Administered Medications for the 6/17/24 encounter (Office Visit) with Chela Lorenzana, CANDIDA, FNP   Medication Dose Route Frequency Provider Last Rate Last Admin    BUPivacaine 0.25% (2.5 mg/ml) injection 2.5 mg  1 mL INTRABURSAL 1 time in Clinic/HOD Hong Rodriguez MD        triamcinolone acetonide injection 40 mg  40 mg INTRABURSAL Once Hong Rodriguez MD           Allergies  Review of patient's allergies indicates:   Allergen Reactions    Sulfa (sulfonamide antibiotics)        Physical Examination     Vitals:    06/17/24 1635   BP: 119/83   BP Location: Left arm   Patient Position: Sitting   BP Method: Large (Automatic)   Pulse: 62   Resp: 18   Temp: 98.5 °F (36.9 °C)   TempSrc: Oral   SpO2:  "100%   Weight: 112.4 kg (247 lb 12.8 oz)   Height: 5' 6" (1.676 m)     Physical Exam  Vitals and nursing note reviewed.   Constitutional:       General: She is not in acute distress.     Appearance: Normal appearance. She is not ill-appearing.   Eyes:      Extraocular Movements: Extraocular movements intact.      Pupils: Pupils are equal, round, and reactive to light.   Cardiovascular:      Rate and Rhythm: Normal rate and regular rhythm.      Heart sounds: Normal heart sounds.   Pulmonary:      Effort: Pulmonary effort is normal.      Breath sounds: Normal breath sounds.   Abdominal:      General: Bowel sounds are normal.      Palpations: Abdomen is soft.   Musculoskeletal:         General: Normal range of motion.   Skin:     Findings: No rash.   Neurological:      General: No focal deficit present.      Mental Status: She is alert and oriented to person, place, and time. Mental status is at baseline.   Psychiatric:         Mood and Affect: Mood normal.         Behavior: Behavior normal.          Assessment and Plan (including Health Maintenance)      Problem List  [employer]  Document Outside HM   :    Plan:         Health Maintenance Due   Topic Date Due    Pneumococcal Vaccines (Age 0-64) (1 of 2 - PCV) Never done    HIV Screening  Never done    TETANUS VACCINE  Never done    COVID-19 Vaccine (5 - 2023-24 season) 09/01/2023       Problem List Items Addressed This Visit    None  Visit Diagnoses       Vaginal itching    -  Primary    Relevant Medications    nystatin-triamcinolone (MYCOLOG II) cream    Other Relevant Orders    POCT URINALYSIS W/O SCOPE (Completed)          Vaginal itching  -     POCT URINALYSIS W/O SCOPE  -     nystatin-triamcinolone (MYCOLOG II) cream; Apply topically 2 (two) times daily.  Dispense: 15 g; Refill: 0       Health Maintenance Topics with due status: Not Due       Topic Last Completion Date    Cervical Cancer Screening 03/01/2023    Hemoglobin A1c (Diabetic Prevention Screening) " 01/09/2024    Colorectal Cancer Screening 02/26/2024    Mammogram 03/18/2024    Lipid Panel 05/02/2024           Future Appointments   Date Time Provider Department Center   7/18/2024 10:30 AM [facility]   5/6/2025  8:00 AM Rena Garcia, [facility]        No follow-ups on file.     Signature:  Chela Lorenzana DNP, P  [address] [place] [address]  Phone #: [phone]  Fax #: [phone]    Date of encounter: 6/17/24    Patient Instructions   Stop progesterone. Contact prescriber. Use Mycolog to external genitalia. Follow up with primary care provider.

## 2024-06-19 RX ORDER — BUPIVACAINE HYDROCHLORIDE 2.5 MG/ML
1 INJECTION, SOLUTION INFILTRATION; PERINEURAL
Status: SHIPPED | OUTPATIENT
Start: 2024-05-08

## 2024-06-19 RX ORDER — TRIAMCINOLONE ACETONIDE 40 MG/ML
40 INJECTION, SUSPENSION INTRA-ARTICULAR; INTRAMUSCULAR ONCE
Status: SHIPPED | OUTPATIENT
Start: 2024-05-08

## 2024-06-21 DIAGNOSIS — M54.17 LUMBOSACRAL RADICULOPATHY: Chronic | ICD-10-CM

## 2024-06-21 DIAGNOSIS — K44.9 HIATAL HERNIA: Chronic | ICD-10-CM

## 2024-06-21 DIAGNOSIS — M89.49 OSTEOARTHROSIS MULTIPLE SITES, NOT SPECIFIED AS GENERALIZED: Chronic | ICD-10-CM

## 2024-06-21 DIAGNOSIS — K21.00 GASTROESOPHAGEAL REFLUX DISEASE WITH ESOPHAGITIS WITHOUT HEMORRHAGE: ICD-10-CM

## 2024-06-21 RX ORDER — ESOMEPRAZOLE MAGNESIUM 40 MG/1
40 CAPSULE, DELAYED RELEASE ORAL
Qty: 90 CAPSULE | Refills: 1 | Status: SHIPPED | OUTPATIENT
Start: 2024-06-21 | End: 2025-06-21

## 2024-06-21 RX ORDER — PREGABALIN 100 MG/1
100 CAPSULE ORAL 2 TIMES DAILY
Qty: 60 CAPSULE | Refills: 2 | Status: SHIPPED | OUTPATIENT
Start: 2024-06-21

## 2024-07-29 NOTE — PROGRESS NOTES
Subjective:         Patient ID: Marcell Dickson is a 54 y.o. female.    Chief Complaint: Hip Pain (left)      Pain  This is a chronic problem. The current episode started more than 1 year ago. The problem occurs daily. The problem has been waxing and waning. Associated symptoms include arthralgias. Pertinent negatives include no anorexia, change in bowel habit, chest pain, chills, coughing, diaphoresis, fever, neck pain, sore throat, swollen glands, urinary symptoms, vertigo, visual change or vomiting.     Review of Systems   Constitutional:  Negative for activity change, appetite change, chills, diaphoresis, fever and unexpected weight change.   HENT:  Negative for drooling, ear discharge, ear pain, facial swelling, nosebleeds, sore throat, trouble swallowing, voice change and goiter.    Eyes:  Negative for photophobia, pain, discharge, redness and visual disturbance.   Respiratory:  Negative for apnea, cough, choking, chest tightness, shortness of breath, wheezing and stridor.    Cardiovascular:  Negative for chest pain, palpitations and leg swelling.   Gastrointestinal:  Negative for abdominal distention, anorexia, change in bowel habit, diarrhea, rectal pain, vomiting and fecal incontinence.   Endocrine: Negative for cold intolerance, heat intolerance, polydipsia, polyphagia and polyuria.   Genitourinary:  Negative for bladder incontinence, dysuria, flank pain, frequency and hot flashes.   Musculoskeletal:  Positive for arthralgias, back pain and leg pain. Negative for neck pain.   Integumentary:  Negative for color change and pallor.   Allergic/Immunologic: Negative for immunocompromised state.   Neurological:  Negative for dizziness, vertigo, seizures, syncope, facial asymmetry, speech difficulty, light-headedness, memory loss and coordination difficulties.   Hematological:  Negative for adenopathy. Does not bruise/bleed easily.   Psychiatric/Behavioral:  Negative for agitation, behavioral  problems, confusion, decreased concentration, dysphoric mood, hallucinations, self-injury and suicidal ideas. The patient is not nervous/anxious and is not hyperactive.            Past Medical History:   Diagnosis Date    Arthritis     GERD (gastroesophageal reflux disease)     History of rectal polyps 08/07/2018     Past Surgical History:   Procedure Laterality Date    breast reduction      CARPAL TUNNEL RELEASE Right 9/28/2023    Procedure: RELEASE, CARPAL TUNNEL;  Surgeon: Hong Rodriguez MD;  Location: [facility] OR;  Service: Orthopedics;  Laterality: Right;    CHOLECYSTECTOMY  10/28/2013    Dr. Paige    COLONOSCOPY  2018    DE QUERVAIN'S RELEASE Right 9/28/2023    Procedure: RELEASE, HAND, FOR DEQUERVAIN'S TENOSYNOVITIS;  Surgeon: Hong Rodriguez MD;  Location: [facility] OR;  Service: Orthopedics;  Laterality: Right;    EXCISION OF GANGLION OF WRIST Right 9/28/2023    Procedure: EXCISION, GANGLION CYST, WRIST;  Surgeon: Hong Rodriguez MD;  Location: [facility] OR;  Service: Orthopedics;  Laterality: Right;    gastric sleeve      HYSTERECTOMY  10/28/2013    Robotic, BSO,Cystoscopy, TOT-    OOPHORECTOMY      TUBAL LIGATION      philip gamez       Social History     Socioeconomic History    Marital status:      Spouse name: Guillermo    Number of children: 4   Tobacco Use    Smoking status: Never     Passive exposure: Never    Smokeless tobacco: Never   Substance and Sexual Activity    Alcohol use: Yes     Comment: occasionally    Drug use: Never    Sexual activity: Yes     Partners: Male     Family History   Problem Relation Name Age of Onset    Heart failure Mother      Heart disease Father      Colon cancer Sister      Stomach cancer Brother Joel     Lung cancer Brother Joel      Review of patient's allergies indicates:   Allergen Reactions    Sulfa (sulfonamide antibiotics)         Objective:  Vitals:    07/31/24 0943   BP: 127/72  "  Pulse: 85   Resp: 18   Weight: 111.6 kg (246 lb)   Height: 5' 7" (1.702 m)   PainSc:   8           Physical Exam  Vitals and nursing note reviewed. Exam conducted with a chaperone present.   Constitutional:       General: She is awake. She is not in acute distress.     Appearance: Normal appearance. She is not ill-appearing, toxic-appearing or diaphoretic.   HENT:      Head: Normocephalic and atraumatic.      Nose: Nose normal.      Mouth/Throat:      Mouth: Mucous membranes are moist.      Pharynx: Oropharynx is clear.   Eyes:      Conjunctiva/sclera: Conjunctivae normal.      Pupils: Pupils are equal, round, and reactive to light.   Cardiovascular:      Rate and Rhythm: Normal rate.   Pulmonary:      Effort: Pulmonary effort is normal. No respiratory distress.   Abdominal:      Palpations: Abdomen is soft.      Tenderness: There is no guarding.   Musculoskeletal:         General: Normal range of motion.      Cervical back: Normal range of motion and neck supple. No rigidity.   Skin:     General: Skin is warm and dry.      Coloration: Skin is not jaundiced or pale.   Neurological:      General: No focal deficit present.      Mental Status: She is alert and oriented to person, place, and time. Mental status is at baseline.      Cranial Nerves: No cranial nerve deficit (II-XII).   Psychiatric:         Mood and Affect: Mood normal.         Behavior: Behavior normal. Behavior is cooperative.         Thought Content: Thought content normal.         Colonoscopy  Narrative: Table formatting from the original result was not included.  Procedure Date  2/26/24    Impression  Overall   Impression:    The ileocecal valve, cecum, ascending colon, transverse colon, descending   colon and sigmoid colon appeared normal.  (grade 2) hemorrhoids    Recommendation   Repeat screening colonoscopy in 10 years     Outcome of procedure: successful Colonoscopy  Disposition: patient to recovery following procedure; discharge to home   when " appropriate parameters met  Provisions for follow up: please call my office for any unexpected   symptoms like chest or abdominal pain or bleeding following your   procedure.  Final Diagnosis: personal h/o polyps    Indication  Colon cancer screening, Personal h/o colon polyps     Providers  Derrek Rios, RN Registered Nurse   Lexi Mendez, RN Registered Nurse   Vinny Miller CRNA CRNA Parker, Adam, MD Proceduralist   Yadira Rios RN Registered Nurse     Medications  Moderate sedation administered by anesthesia staff - See anesthesia   record.    Preprocedure  A history and physical has been performed, and patient medication   allergies have been reviewed. The patient's tolerance of previous   anesthesia has been reviewed. The risks and benefits of the procedure and   the sedation options and risks were discussed with the patient. All   questions were answered and informed consent obtained.    ASA Score: ASA 3 - Patient with moderate systemic disease with functional   limitations  Mallampati Airway Score: II (hard and soft palate, upper portion of   tonsils anduvula visible)    Details of the Procedure  The patient underwent monitored anesthesia care, which was administered by   an anesthesia professional. The patient's heart rate, blood pressure,   level of consciousness, respirations, oxygen, ECG and ETCO2 were monitored   throughout the procedure. A digital rectal exam was performed. A perianal   exam was performed. The scope was introduced through the anus and advanced   to the cecum. Retroflexion was not performed due to endocuff. The quality   of bowel preparation was evaluated using the [place] Bowel Preparation   Scale with scores of: right colon = 2, transverse colon = 2, left colon =   2. The total BBPS score was 6. Bowel prep was adequate. The patient's   estimated blood loss was minimal (<5 mL). The procedure was not difficult.   The patient tolerated the procedure well. There  were no apparent adverse   events.     Scope: Colonoscope  Scope Serial: [id number]    Events    Procedure Events   Event Event Time     Procedure Events   Event Event Time   ENDO SCOPE IN TIME 2/26/2024 10:05 AM   ENDO CECUM REACHED 2/26/2024 10:08 AM   ENDO SCOPE OUT TIME 2/26/2024 10:23 AM     CECAL WITHDRAWAL TIME: 15m 22s    Findings  The ileocecal valve, cecum, ascending colon, transverse colon, descending   colon and sigmoid colon appeared normal.  Internal (grade 2) hemorrhoids; no bleeding was identified       Office Visit on 06/17/2024   Component Date Value Ref Range Status    Color, UA 06/17/2024 Yellow   Final    Spec Grav UA 06/17/2024 1.030   Final    pH, UA 06/17/2024 6.0   Final    WBC, UA 06/17/2024 nerg   Final    Nitrite, UA 06/17/2024 neg   Final    Protein, POC 06/17/2024 neg   Final    Glucose, UA 06/17/2024 neg   Final    Ketones, UA 06/17/2024 neg   Final    Bilirubin, POC 06/17/2024 neg   Final    Urobilinogen, UA 06/17/2024 1.0   Final    Blood, UA 06/17/2024 neg   Final   Abstract on 05/02/2024   Component Date Value Ref Range Status    BCS Recommendation External 03/18/2024 Repeat mammogram in 1 year   Final   Office Visit on 05/02/2024   Component Date Value Ref Range Status    Glucose, Fasting 05/02/2024 77  74 - 106 mg/dL Final    Triglycerides 05/02/2024 75  35 - 150 mg/dL Final    Cholesterol 05/02/2024 147  0 - 200 mg/dL Final    HDL Cholesterol 05/02/2024 57  40 - 60 mg/dL Final    Cholesterol/HDL Ratio (Risk Factor) 05/02/2024 2.6   Final    Non-HDL 05/02/2024 90  mg/dL Final    LDL Calculated 05/02/2024 75  mg/dL Final    LDL/HDL 05/02/2024 1.3   Final    VLDL 05/02/2024 15  mg/dL Final   Office Visit on 02/20/2024   Component Date Value Ref Range Status    Color, UA 02/20/2024 Yellow   Final    Spec Grav UA 02/20/2024 1.010   Final    pH, UA 02/20/2024 5.5   Final    WBC, UA 02/20/2024 neg   Final    Nitrite, UA 02/20/2024 neg   Final    Protein, POC  02/20/2024 neg   Final    Glucose, UA 02/20/2024 neg   Final    Ketones, UA 02/20/2024 neg   Final    Bilirubin, POC 02/20/2024 neg   Final    Urobilinogen, UA 02/20/2024 0.2   Final    Blood, UA 02/20/2024 neg   Final    Candida Species 02/20/2024 Negative  Negative, Invalid Final    Gardnerella 02/20/2024 Negative  Negative, Invalid Final    Trichomonas 02/20/2024 Negative  Negative, Invalid Final         No orders of the defined types were placed in this encounter.      Requested Prescriptions      No prescriptions requested or ordered in this encounter       Assessment:     No diagnosis found.       A's of Opioid Risk Assessment  Activity:Patient can perform ADL.   Analgesia:Patients pain is partially controlled by current medication. Patient has tried OTC medications such as Tylenol and Ibuprofen with out relief.   Adverse Effects: Patient denies constipation or sedation.  Aberrant Behavior:  reviewed with no aberrant drug seeking/taking behavior.  Overdose reversal drug naloxone discussed    Drug screen reviewed      October 1, 2023 CT abdomen  Multiple simple cyst within the right kidney. The largest cyst measures up to 3.9 cm. Multiple simple cyst within the left kidney with the largest measuring up to 1.4 cm.     There are two 0.8 cm nodules located within the anterior left lower lobe, annual follow-up with CT of the chest without intravenous contrast recommended.     Subcentimeter nonobstructing stone right kidney.        Plan:    Not using narcotics from our office      Last seen in clinic July 17, 2023 regarding above CT    Primary care provider reordered CT chest December 21, 2023    Could not tolerate gabapentin gastric upset    Left lower extremity discomfort radicular in nature ongoing for more than 3 months pain numbness and tingling radiating into the left foot    Intermittent cervical radicular-type symptoms upper extremity    Requesting procedure for left-sided lumbar  radiculopathy    Requesting Toradol injection    Toradol 60 mg IM tolerated well    MRI lumbar spine [facility] July 7, 2023 L5/S1 left-sided neuroforaminal stenosis greater than right, L4/5 neuroforaminal narrowing left greater than right disc bulge, L3/4 bilateral neural foraminal stenosis multiple level degenerative changes  Large right renal cysts    X-ray sacroiliac joint [facility] May 15, 2023 degenerative changes no fracture noted    X-ray lumbar spine [facility] May 15, 2023 degenerative changes multiple level    Continue home exercise program as directed    Indications for this procedure for this specific patient include the following     - Injections being provided as part of a comprehensive pain management program.    - Pt has failed 6 weeks of conservative therapy including oral meds, PT and or home exercise program which has been discussed with the patient   - Injection being provided for suspected radicular pain.    - Pain scale of greater than or equal to 3/10 with functional impairment  - No evidence of local or systemic infection, bleeding tendency or unstable medical condition.    - Pain is causing significant functional limitation resulting in diminished quality of life and impaired age appropriate ADL's.   - Repeat injections are done no sooner than 7 days after the previous injection  - Epidural done for suspected radicular pain along dermatome of nerve   - Epidural done to differentiate level of radicular nerve root pain   -procedure done prior to surgical consideration  - Repeat injections done only when pt reports 50% improvement in pain from previous injections    -increased level of function  - patient is aware if they take any NSAIDs/anticoagulant prior to procedure procedure will be postponed, this was listed on the preop instructions and highlighted, list of NSAIDs/anticoagulant reviewed with patient to include methotrexate  - Injection done at L4/5 level(s) which is consistent  with patient's dermatomal pain complaint  The planned medically necessary  surgical procedure is performed in a hospital outpatient department and not in an ambulatory surgical center due to:     -there is no geographically assessable ambulatory surgery center that has the  necessary equipment and fluoroscopy needed for the procedure     -there is no geographically assessable ambulatory surgical center available at which the physician has privileges     -an ASC's  specific  guideline regarding the individuals weight or health conditions that prevent the use of an ASC     -done under fluoro  Monitor anesthesia request is medically indicated for the scheduled nerve block procedure due to:  1- needle phobia and anxiety, placing  the patient at risk during the provided service.  2-patient has an ASA class greater than 3 and requires constant presence of an anesthesiologist during the procedure,   3-patient has severe problems hard to lie still  4-patient suffers from chronic pain and is unable to function due to  diminished ADLs    Schedule lumbar L4/5 left-sided TFESI # 1, lumbar radicular    Dr. Schulz    Bring original prescription medication bottles/container/box with labels to each visit

## 2024-07-29 NOTE — H&P (VIEW-ONLY)
Subjective:         Patient ID: Marcell Dickson is a 54 y.o. female.    Chief Complaint: Hip Pain (left)      Pain  This is a chronic problem. The current episode started more than 1 year ago. The problem occurs daily. The problem has been waxing and waning. Associated symptoms include arthralgias. Pertinent negatives include no anorexia, change in bowel habit, chest pain, chills, coughing, diaphoresis, fever, neck pain, sore throat, swollen glands, urinary symptoms, vertigo, visual change or vomiting.     Review of Systems   Constitutional:  Negative for activity change, appetite change, chills, diaphoresis, fever and unexpected weight change.   HENT:  Negative for drooling, ear discharge, ear pain, facial swelling, nosebleeds, sore throat, trouble swallowing, voice change and goiter.    Eyes:  Negative for photophobia, pain, discharge, redness and visual disturbance.   Respiratory:  Negative for apnea, cough, choking, chest tightness, shortness of breath, wheezing and stridor.    Cardiovascular:  Negative for chest pain, palpitations and leg swelling.   Gastrointestinal:  Negative for abdominal distention, anorexia, change in bowel habit, diarrhea, rectal pain, vomiting and fecal incontinence.   Endocrine: Negative for cold intolerance, heat intolerance, polydipsia, polyphagia and polyuria.   Genitourinary:  Negative for bladder incontinence, dysuria, flank pain, frequency and hot flashes.   Musculoskeletal:  Positive for arthralgias, back pain and leg pain. Negative for neck pain.   Integumentary:  Negative for color change and pallor.   Allergic/Immunologic: Negative for immunocompromised state.   Neurological:  Negative for dizziness, vertigo, seizures, syncope, facial asymmetry, speech difficulty, light-headedness, memory loss and coordination difficulties.   Hematological:  Negative for adenopathy. Does not bruise/bleed easily.   Psychiatric/Behavioral:  Negative for agitation, behavioral  problems, confusion, decreased concentration, dysphoric mood, hallucinations, self-injury and suicidal ideas. The patient is not nervous/anxious and is not hyperactive.            Past Medical History:   Diagnosis Date    Arthritis     GERD (gastroesophageal reflux disease)     History of rectal polyps 08/07/2018     Past Surgical History:   Procedure Laterality Date    breast reduction      CARPAL TUNNEL RELEASE Right 9/28/2023    Procedure: RELEASE, CARPAL TUNNEL;  Surgeon: Hong Rodriguez MD;  Location: [facility] OR;  Service: Orthopedics;  Laterality: Right;    CHOLECYSTECTOMY  10/28/2013    Dr. Paige    COLONOSCOPY  2018    DE QUERVAIN'S RELEASE Right 9/28/2023    Procedure: RELEASE, HAND, FOR DEQUERVAIN'S TENOSYNOVITIS;  Surgeon: Hong Rodriguez MD;  Location: [facility] OR;  Service: Orthopedics;  Laterality: Right;    EXCISION OF GANGLION OF WRIST Right 9/28/2023    Procedure: EXCISION, GANGLION CYST, WRIST;  Surgeon: Hong Rodriguez MD;  Location: [facility] OR;  Service: Orthopedics;  Laterality: Right;    gastric sleeve      HYSTERECTOMY  10/28/2013    Robotic, BSO,Cystoscopy, TOT-    OOPHORECTOMY      TUBAL LIGATION      philip gamez       Social History     Socioeconomic History    Marital status:      Spouse name: Guillermo    Number of children: 4   Tobacco Use    Smoking status: Never     Passive exposure: Never    Smokeless tobacco: Never   Substance and Sexual Activity    Alcohol use: Yes     Comment: occasionally    Drug use: Never    Sexual activity: Yes     Partners: Male     Family History   Problem Relation Name Age of Onset    Heart failure Mother      Heart disease Father      Colon cancer Sister      Stomach cancer Brother Joel     Lung cancer Brother Joel      Review of patient's allergies indicates:   Allergen Reactions    Sulfa (sulfonamide antibiotics)         Objective:  Vitals:    07/31/24 0943   BP: 127/72  "  Pulse: 85   Resp: 18   Weight: 111.6 kg (246 lb)   Height: 5' 7" (1.702 m)   PainSc:   8           Physical Exam  Vitals and nursing note reviewed. Exam conducted with a chaperone present.   Constitutional:       General: She is awake. She is not in acute distress.     Appearance: Normal appearance. She is not ill-appearing, toxic-appearing or diaphoretic.   HENT:      Head: Normocephalic and atraumatic.      Nose: Nose normal.      Mouth/Throat:      Mouth: Mucous membranes are moist.      Pharynx: Oropharynx is clear.   Eyes:      Conjunctiva/sclera: Conjunctivae normal.      Pupils: Pupils are equal, round, and reactive to light.   Cardiovascular:      Rate and Rhythm: Normal rate.   Pulmonary:      Effort: Pulmonary effort is normal. No respiratory distress.   Abdominal:      Palpations: Abdomen is soft.      Tenderness: There is no guarding.   Musculoskeletal:         General: Normal range of motion.      Cervical back: Normal range of motion and neck supple. No rigidity.   Skin:     General: Skin is warm and dry.      Coloration: Skin is not jaundiced or pale.   Neurological:      General: No focal deficit present.      Mental Status: She is alert and oriented to person, place, and time. Mental status is at baseline.      Cranial Nerves: No cranial nerve deficit (II-XII).   Psychiatric:         Mood and Affect: Mood normal.         Behavior: Behavior normal. Behavior is cooperative.         Thought Content: Thought content normal.         Colonoscopy  Narrative: Table formatting from the original result was not included.  Procedure Date  2/26/24    Impression  Overall   Impression:    The ileocecal valve, cecum, ascending colon, transverse colon, descending   colon and sigmoid colon appeared normal.  (grade 2) hemorrhoids    Recommendation   Repeat screening colonoscopy in 10 years     Outcome of procedure: successful Colonoscopy  Disposition: patient to recovery following procedure; discharge to home   when " appropriate parameters met  Provisions for follow up: please call my office for any unexpected   symptoms like chest or abdominal pain or bleeding following your   procedure.  Final Diagnosis: personal h/o polyps    Indication  Colon cancer screening, Personal h/o colon polyps     Providers  Derrek Rios, RN Registered Nurse   Lexi Mendez, RN Registered Nurse   Vinny Miller CRNA CRNA Parker, Adam, MD Proceduralist   Yadira Rios RN Registered Nurse     Medications  Moderate sedation administered by anesthesia staff - See anesthesia   record.    Preprocedure  A history and physical has been performed, and patient medication   allergies have been reviewed. The patient's tolerance of previous   anesthesia has been reviewed. The risks and benefits of the procedure and   the sedation options and risks were discussed with the patient. All   questions were answered and informed consent obtained.    ASA Score: ASA 3 - Patient with moderate systemic disease with functional   limitations  Mallampati Airway Score: II (hard and soft palate, upper portion of   tonsils anduvula visible)    Details of the Procedure  The patient underwent monitored anesthesia care, which was administered by   an anesthesia professional. The patient's heart rate, blood pressure,   level of consciousness, respirations, oxygen, ECG and ETCO2 were monitored   throughout the procedure. A digital rectal exam was performed. A perianal   exam was performed. The scope was introduced through the anus and advanced   to the cecum. Retroflexion was not performed due to endocuff. The quality   of bowel preparation was evaluated using the [place] Bowel Preparation   Scale with scores of: right colon = 2, transverse colon = 2, left colon =   2. The total BBPS score was 6. Bowel prep was adequate. The patient's   estimated blood loss was minimal (<5 mL). The procedure was not difficult.   The patient tolerated the procedure well. There  were no apparent adverse   events.     Scope: Colonoscope  Scope Serial: [id number]    Events    Procedure Events   Event Event Time     Procedure Events   Event Event Time   ENDO SCOPE IN TIME 2/26/2024 10:05 AM   ENDO CECUM REACHED 2/26/2024 10:08 AM   ENDO SCOPE OUT TIME 2/26/2024 10:23 AM     CECAL WITHDRAWAL TIME: 15m 22s    Findings  The ileocecal valve, cecum, ascending colon, transverse colon, descending   colon and sigmoid colon appeared normal.  Internal (grade 2) hemorrhoids; no bleeding was identified       Office Visit on 06/17/2024   Component Date Value Ref Range Status    Color, UA 06/17/2024 Yellow   Final    Spec Grav UA 06/17/2024 1.030   Final    pH, UA 06/17/2024 6.0   Final    WBC, UA 06/17/2024 nerg   Final    Nitrite, UA 06/17/2024 neg   Final    Protein, POC 06/17/2024 neg   Final    Glucose, UA 06/17/2024 neg   Final    Ketones, UA 06/17/2024 neg   Final    Bilirubin, POC 06/17/2024 neg   Final    Urobilinogen, UA 06/17/2024 1.0   Final    Blood, UA 06/17/2024 neg   Final   Abstract on 05/02/2024   Component Date Value Ref Range Status    BCS Recommendation External 03/18/2024 Repeat mammogram in 1 year   Final   Office Visit on 05/02/2024   Component Date Value Ref Range Status    Glucose, Fasting 05/02/2024 77  74 - 106 mg/dL Final    Triglycerides 05/02/2024 75  35 - 150 mg/dL Final    Cholesterol 05/02/2024 147  0 - 200 mg/dL Final    HDL Cholesterol 05/02/2024 57  40 - 60 mg/dL Final    Cholesterol/HDL Ratio (Risk Factor) 05/02/2024 2.6   Final    Non-HDL 05/02/2024 90  mg/dL Final    LDL Calculated 05/02/2024 75  mg/dL Final    LDL/HDL 05/02/2024 1.3   Final    VLDL 05/02/2024 15  mg/dL Final   Office Visit on 02/20/2024   Component Date Value Ref Range Status    Color, UA 02/20/2024 Yellow   Final    Spec Grav UA 02/20/2024 1.010   Final    pH, UA 02/20/2024 5.5   Final    WBC, UA 02/20/2024 neg   Final    Nitrite, UA 02/20/2024 neg   Final    Protein, POC  02/20/2024 neg   Final    Glucose, UA 02/20/2024 neg   Final    Ketones, UA 02/20/2024 neg   Final    Bilirubin, POC 02/20/2024 neg   Final    Urobilinogen, UA 02/20/2024 0.2   Final    Blood, UA 02/20/2024 neg   Final    Candida Species 02/20/2024 Negative  Negative, Invalid Final    Gardnerella 02/20/2024 Negative  Negative, Invalid Final    Trichomonas 02/20/2024 Negative  Negative, Invalid Final         No orders of the defined types were placed in this encounter.      Requested Prescriptions      No prescriptions requested or ordered in this encounter       Assessment:     No diagnosis found.       A's of Opioid Risk Assessment  Activity:Patient can perform ADL.   Analgesia:Patients pain is partially controlled by current medication. Patient has tried OTC medications such as Tylenol and Ibuprofen with out relief.   Adverse Effects: Patient denies constipation or sedation.  Aberrant Behavior:  reviewed with no aberrant drug seeking/taking behavior.  Overdose reversal drug naloxone discussed    Drug screen reviewed      October 1, 2023 CT abdomen  Multiple simple cyst within the right kidney. The largest cyst measures up to 3.9 cm. Multiple simple cyst within the left kidney with the largest measuring up to 1.4 cm.     There are two 0.8 cm nodules located within the anterior left lower lobe, annual follow-up with CT of the chest without intravenous contrast recommended.     Subcentimeter nonobstructing stone right kidney.        Plan:    Not using narcotics from our office      Last seen in clinic July 17, 2023 regarding above CT    Primary care provider reordered CT chest December 21, 2023    Could not tolerate gabapentin gastric upset    Left lower extremity discomfort radicular in nature ongoing for more than 3 months pain numbness and tingling radiating into the left foot    Intermittent cervical radicular-type symptoms upper extremity    Requesting procedure for left-sided lumbar  radiculopathy    Requesting Toradol injection    Toradol 60 mg IM tolerated well    MRI lumbar spine [facility] July 7, 2023 L5/S1 left-sided neuroforaminal stenosis greater than right, L4/5 neuroforaminal narrowing left greater than right disc bulge, L3/4 bilateral neural foraminal stenosis multiple level degenerative changes  Large right renal cysts    X-ray sacroiliac joint [facility] May 15, 2023 degenerative changes no fracture noted    X-ray lumbar spine [facility] May 15, 2023 degenerative changes multiple level    Continue home exercise program as directed    Indications for this procedure for this specific patient include the following     - Injections being provided as part of a comprehensive pain management program.    - Pt has failed 6 weeks of conservative therapy including oral meds, PT and or home exercise program which has been discussed with the patient   - Injection being provided for suspected radicular pain.    - Pain scale of greater than or equal to 3/10 with functional impairment  - No evidence of local or systemic infection, bleeding tendency or unstable medical condition.    - Pain is causing significant functional limitation resulting in diminished quality of life and impaired age appropriate ADL's.   - Repeat injections are done no sooner than 7 days after the previous injection  - Epidural done for suspected radicular pain along dermatome of nerve   - Epidural done to differentiate level of radicular nerve root pain   -procedure done prior to surgical consideration  - Repeat injections done only when pt reports 50% improvement in pain from previous injections    -increased level of function  - patient is aware if they take any NSAIDs/anticoagulant prior to procedure procedure will be postponed, this was listed on the preop instructions and highlighted, list of NSAIDs/anticoagulant reviewed with patient to include methotrexate  - Injection done at L4/5 level(s) which is consistent  with patient's dermatomal pain complaint  The planned medically necessary  surgical procedure is performed in a hospital outpatient department and not in an ambulatory surgical center due to:     -there is no geographically assessable ambulatory surgery center that has the  necessary equipment and fluoroscopy needed for the procedure     -there is no geographically assessable ambulatory surgical center available at which the physician has privileges     -an ASC's  specific  guideline regarding the individuals weight or health conditions that prevent the use of an ASC     -done under fluoro  Monitor anesthesia request is medically indicated for the scheduled nerve block procedure due to:  1- needle phobia and anxiety, placing  the patient at risk during the provided service.  2-patient has an ASA class greater than 3 and requires constant presence of an anesthesiologist during the procedure,   3-patient has severe problems hard to lie still  4-patient suffers from chronic pain and is unable to function due to  diminished ADLs    Schedule lumbar L4/5 left-sided TFESI # 1, lumbar radicular    Dr. Schulz    Bring original prescription medication bottles/container/box with labels to each visit

## 2024-07-31 ENCOUNTER — OFFICE VISIT (OUTPATIENT)
Dept: PAIN MEDICINE | Facility: CLINIC | Age: 54
End: 2024-07-31
Payer: COMMERCIAL

## 2024-07-31 VITALS
RESPIRATION RATE: 18 BRPM | WEIGHT: 246 LBS | HEART RATE: 85 BPM | DIASTOLIC BLOOD PRESSURE: 72 MMHG | BODY MASS INDEX: 38.61 KG/M2 | SYSTOLIC BLOOD PRESSURE: 127 MMHG | HEIGHT: 67 IN

## 2024-07-31 DIAGNOSIS — M89.49 OSTEOARTHROSIS MULTIPLE SITES, NOT SPECIFIED AS GENERALIZED: Chronic | ICD-10-CM

## 2024-07-31 DIAGNOSIS — M54.16 LUMBAR RADICULOPATHY, CHRONIC: Primary | Chronic | ICD-10-CM

## 2024-07-31 PROCEDURE — 3044F HG A1C LEVEL LT 7.0%: CPT | Mod: ,,, | Performed by: PHYSICIAN ASSISTANT

## 2024-07-31 PROCEDURE — 99215 OFFICE O/P EST HI 40 MIN: CPT | Mod: PBBFAC | Performed by: PHYSICIAN ASSISTANT

## 2024-07-31 PROCEDURE — 3008F BODY MASS INDEX DOCD: CPT | Mod: ,,, | Performed by: PHYSICIAN ASSISTANT

## 2024-07-31 PROCEDURE — 99214 OFFICE O/P EST MOD 30 MIN: CPT | Mod: S$PBB,25,, | Performed by: PHYSICIAN ASSISTANT

## 2024-07-31 PROCEDURE — 3078F DIAST BP <80 MM HG: CPT | Mod: ,,, | Performed by: PHYSICIAN ASSISTANT

## 2024-07-31 PROCEDURE — 3074F SYST BP LT 130 MM HG: CPT | Mod: ,,, | Performed by: PHYSICIAN ASSISTANT

## 2024-07-31 PROCEDURE — 96372 THER/PROPH/DIAG INJ SC/IM: CPT | Mod: PBBFAC | Performed by: PHYSICIAN ASSISTANT

## 2024-07-31 PROCEDURE — 99999 PR PBB SHADOW E&M-EST. PATIENT-LVL V: CPT | Mod: PBBFAC,,, | Performed by: PHYSICIAN ASSISTANT

## 2024-07-31 PROCEDURE — 1159F MED LIST DOCD IN RCRD: CPT | Mod: ,,, | Performed by: PHYSICIAN ASSISTANT

## 2024-07-31 PROCEDURE — 99999PBSHW PR PBB SHADOW TECHNICAL ONLY FILED TO HB: Mod: PBBFAC,,,

## 2024-07-31 RX ORDER — KETOROLAC TROMETHAMINE 30 MG/ML
60 INJECTION, SOLUTION INTRAMUSCULAR; INTRAVENOUS
Status: COMPLETED | OUTPATIENT
Start: 2024-07-31 | End: 2024-07-31

## 2024-07-31 RX ADMIN — KETOROLAC TROMETHAMINE 60 MG: 30 INJECTION, SOLUTION INTRAMUSCULAR at 10:07

## 2024-07-31 NOTE — PATIENT INSTRUCTIONS

## 2024-08-20 ENCOUNTER — HOSPITAL ENCOUNTER (OUTPATIENT)
Facility: HOSPITAL | Age: 54
Discharge: HOME | End: 2024-08-20
Attending: PAIN MEDICINE | Admitting: PAIN MEDICINE
Payer: COMMERCIAL

## 2024-08-20 ENCOUNTER — ANESTHESIA EVENT (OUTPATIENT)
Dept: PAIN MEDICINE | Facility: HOSPITAL | Age: 54
End: 2024-08-20
Payer: COMMERCIAL

## 2024-08-20 ENCOUNTER — ANESTHESIA (OUTPATIENT)
Dept: PAIN MEDICINE | Facility: HOSPITAL | Age: 54
End: 2024-08-20
Payer: COMMERCIAL

## 2024-08-20 VITALS
BODY MASS INDEX: 38.24 KG/M2 | RESPIRATION RATE: 23 BRPM | HEART RATE: 61 BPM | SYSTOLIC BLOOD PRESSURE: 132 MMHG | HEIGHT: 67 IN | TEMPERATURE: 99 F | WEIGHT: 243.63 LBS | OXYGEN SATURATION: 100 % | DIASTOLIC BLOOD PRESSURE: 90 MMHG

## 2024-08-20 DIAGNOSIS — I10 PRIMARY HYPERTENSION: ICD-10-CM

## 2024-08-20 DIAGNOSIS — M54.16 LUMBAR RADICULOPATHY: ICD-10-CM

## 2024-08-20 PROCEDURE — 25000003 PHARM REV CODE 250: Performed by: PAIN MEDICINE

## 2024-08-20 PROCEDURE — 64483 NJX AA&/STRD TFRM EPI L/S 1: CPT | Mod: LT,,, | Performed by: PAIN MEDICINE

## 2024-08-20 PROCEDURE — 63600175 PHARM REV CODE 636 W HCPCS: Performed by: NURSE ANESTHETIST, CERTIFIED REGISTERED

## 2024-08-20 PROCEDURE — 64483 NJX AA&/STRD TFRM EPI L/S 1: CPT | Mod: LT | Performed by: PAIN MEDICINE

## 2024-08-20 PROCEDURE — 25500020 PHARM REV CODE 255: Performed by: PAIN MEDICINE

## 2024-08-20 PROCEDURE — 37000008 HC ANESTHESIA 1ST 15 MINUTES: Performed by: PAIN MEDICINE

## 2024-08-20 PROCEDURE — 63600175 PHARM REV CODE 636 W HCPCS: Performed by: PAIN MEDICINE

## 2024-08-20 RX ORDER — SODIUM CHLORIDE 9 MG/ML
INJECTION, SOLUTION INTRAVENOUS CONTINUOUS
Status: DISCONTINUED | OUTPATIENT
Start: 2024-08-20 | End: 2024-08-20 | Stop reason: HOSPADM

## 2024-08-20 RX ORDER — PROPOFOL 10 MG/ML
INJECTION, EMULSION INTRAVENOUS
Status: DISCONTINUED | OUTPATIENT
Start: 2024-08-20 | End: 2024-08-20

## 2024-08-20 RX ORDER — TRIAMCINOLONE ACETONIDE 40 MG/ML
INJECTION, SUSPENSION INTRA-ARTICULAR; INTRAMUSCULAR CODE/TRAUMA/SEDATION MEDICATION
Status: DISCONTINUED | OUTPATIENT
Start: 2024-08-20 | End: 2024-08-20 | Stop reason: HOSPADM

## 2024-08-20 RX ORDER — HYDROCHLOROTHIAZIDE 25 MG/1
TABLET ORAL
Qty: 90 TABLET | Refills: 1 | Status: SHIPPED | OUTPATIENT
Start: 2024-08-20

## 2024-08-20 RX ORDER — IOPAMIDOL 612 MG/ML
INJECTION, SOLUTION INTRAVASCULAR CODE/TRAUMA/SEDATION MEDICATION
Status: DISCONTINUED | OUTPATIENT
Start: 2024-08-20 | End: 2024-08-20 | Stop reason: HOSPADM

## 2024-08-20 RX ADMIN — PROPOFOL 50 MG: 10 INJECTION, EMULSION INTRAVENOUS at 02:08

## 2024-08-20 RX ADMIN — PROPOFOL 30 MG: 10 INJECTION, EMULSION INTRAVENOUS at 02:08

## 2024-08-20 RX ADMIN — PROPOFOL 80 MG: 10 INJECTION, EMULSION INTRAVENOUS at 02:08

## 2024-08-20 RX ADMIN — SODIUM CHLORIDE: 9 INJECTION, SOLUTION INTRAVENOUS at 02:08

## 2024-08-20 RX ADMIN — PROPOFOL 60 MG: 10 INJECTION, EMULSION INTRAVENOUS at 02:08

## 2024-08-20 NOTE — DISCHARGE SUMMARY
[facility] - Pain Management  Discharge Note  Short Stay    Procedure(s) (LRB):  Injection,steroid,epidural,transforaminal approach, L4/5 (Left)      OUTCOME: Patient tolerated treatment/procedure well without complication and is now ready for discharge.    DISPOSITION: Home or Self Care    FINAL DIAGNOSIS:  Lumbar radiculopathy    FOLLOWUP: In clinic    DISCHARGE INSTRUCTIONS:  See nurse's notes     TIME SPENT ON DISCHARGE: 5 minutes

## 2024-08-20 NOTE — ANESTHESIA PREPROCEDURE EVALUATION
08/20/2024  Marcell Dickson is a 54 y.o., female.      Pre-op Assessment    I have reviewed the Patient Summary Reports.     I have reviewed the Nursing Notes. I have reviewed the NPO Status.   I have reviewed the Medications.     Review of Systems  Anesthesia Hx:  No problems with previous Anesthesia             Family Hx of Anesthesia complications:   Personal Hx of Anesthesia complications                    Social:  Non-Smoker       Hematology/Oncology:  Hematology Normal   Oncology Normal                                   EENT/Dental:  EENT/Dental Normal           Cardiovascular:     Hypertension                                  Hypertension         Pulmonary:  Pulmonary Normal                       Renal/:  Renal/ Normal                 Hepatic/GI:    Hiatal Hernia, GERD       Hernia, Hiatal Hernia      Musculoskeletal:  Arthritis               Neurological:    Neuromuscular Disease,                                 Neuromuscular Disease   Endocrine:        Obesity / BMI > 30  Dermatological:  Skin Normal    Psych:  Psychiatric Normal                    Physical Exam  General: Well nourished, Cooperative, Alert and Oriented    Airway:  Mallampati: II   Mouth Opening: Normal  TM Distance: Normal  Tongue: Normal  Neck ROM: Normal ROM        Anesthesia Plan  Type of Anesthesia, risks & benefits discussed:    Anesthesia Type: Gen Natural Airway  Intra-op Monitoring Plan: Standard ASA Monitors  Post Op Pain Control Plan: multimodal analgesia  Induction:  IV  Informed Consent: Informed consent signed with the Patient and all parties understand the risks and agree with anesthesia plan.  All questions answered. Patient consented to blood products? Yes  ASA Score: 3    Ready For Surgery From Anesthesia Perspective.     .

## 2024-08-20 NOTE — BRIEF OP NOTE
The  Discharge Note  Short Stay    Admit Date: 8/20/2024    Discharge Date: 8/20/2024    Attending Physician: Doreen Schulz     Discharge Provider: Doreen Schulz    Diagnosis: Lumbar radiculopathy    Procedure performed: Left L4-5 TFESI under fluoroscopy    Findings: Procedure tolerated well and without complications. Consistent with diagnosis.    EBL: 0cc    Specimens: None    Discharged Condition: Good    Final Diagnoses: Lumbar radiculopathy, chronic [M54.16]    Disposition: Home or Self Care    Hospital Course: No complications, uneventful    Outcome of Hospitalization, Treatment, Procedure, or Surgery:  Patient was admitted for outpatient interventional pain management procedure. The patient tolerated the procedure well with no complications.    Follow up/Patient Instructions:  Follow up as scheduled in Pain Management office in 3-4 weeks.  Patient has received instructions and follow up date and time.    Medications:  Continue previous medications

## 2024-08-20 NOTE — ANESTHESIA POSTPROCEDURE EVALUATION
Anesthesia Post Evaluation    Patient: Marcell SimonRosaman    Procedure(s) Performed: Procedure(s) (LRB):  Injection,steroid,epidural,transforaminal approach, L4/5 (Left)    Final Anesthesia Type: MAC      Patient location during evaluation: PACU  Patient participation: Yes- Able to Participate  Level of consciousness: awake and alert and oriented  Post-procedure vital signs: reviewed and stable  Pain management: adequate  Airway patency: patent    PONV status at discharge: No PONV  Anesthetic complications: no      Cardiovascular status: blood pressure returned to baseline and hemodynamically stable  Respiratory status: unassisted  Hydration status: euvolemic  Follow-up not needed.  Comments: Refer to nursing note for pain/juan francisco score upon discharge from recovery.              Vitals Value Taken Time   /90 08/20/24 1520   Temp  08/20/24 1757   Pulse 61 08/20/24 1520   Resp 16 08/20/24 1757   SpO2 100 % 08/20/24 1520         Event Time   Out of Recovery 15:20:00         Pain/Juan Francisco Score: Juan Francisco Score: 10 (8/20/2024  3:06 PM)

## 2024-08-20 NOTE — DISCHARGE INSTRUCTIONS
No driving, operating machinery, making legal decisions, or signing legal documents until tomorrow.   Continue diet as tolerated. Drink plenty of fluids and rest.   If unable to void in 8 hours proceed to nearest ER.   Notify MD of redness or drainage from incision site as well as any fever over the next 3-4 days.  No lifting over 5 lbs for the next 24 hrs.   Continue medications as prescribed. May take pain medication as prescribed.   May shower tomorrow. No tub baths for 48 hours following procedure.   May remove bandaids tomorrow, if they fall off before tomorrow they do not have to be replaced.    If you experience any uncontrolled pain, nausea or vomiting after your procedure, do not hesitate to call the clinic.   N/A

## 2024-08-20 NOTE — TRANSFER OF CARE
"Anesthesia Transfer of Care Note    Patient: Marcell SimondahliaSamia    Procedure(s) Performed: Procedure(s) (LRB):  Injection,steroid,epidural,transforaminal approach, L4/5 (Left)    Patient location: PACU    Anesthesia Type: general    Transport from OR: Transported from OR on 2-3 L/min O2 by NC with adequate spontaneous ventilation    Post pain: adequate analgesia    Post assessment: no apparent anesthetic complications and tolerated procedure well    Post vital signs: stable    Level of consciousness: alert and responds to stimulation    Nausea/Vomiting: no nausea/vomiting    Complications: none    Transfer of care protocol was followed      Last vitals: Visit Vitals  BP (!) 127/93   Pulse 105   Temp 37 °C (98.6 °F) (Oral)   Resp (!) 23   Ht 5' 7" (1.702 m)   Wt 110.5 kg (243 lb 9.6 oz)   SpO2 100%   BMI 38.15 kg/m²     "

## 2024-08-20 NOTE — OP NOTE
Procedure Note    Procedure Date: 8/20/2024    Procedure Performed: Left  Transforaminal Epidural @ L4-5 with Fluoroscopic Guidance    Indications: Patient has failed conservative therapy.      Pre-op diagnosis: Lumbar Radiculopathy    Post-op diagnosis: same    Physician: Doreen Schulz MD    Anesthesia: MAC    Medications injected:  kenalog 40mg, sterile preservative-free normal saline.    IVF: Per Anesthesia    Estimated Blood Loss: Less than 1cc    Complications: None    Technique:  The patient was interviewed in the holding area and Risks/Benefits were discussed, including, but not limited to the possibility of new or different pain, bleeding or infection.   All questions were answered.  The patient understood and accepted risks.  Consent was signed.  A time out was taken to identify the patient, procedure and side of the procedure. The patient was placed in a prone position, then prepped and draped in the usual sterile fashion using ChloraPrep and sterile towels.  The left  L4-5 neural foramen were identified under fluoroscopic guidance in AP and oblique view.  Local anesthetic was given by raising a skin wheal with a 25-gauge 1.5 inch needle.  In the oblique view, a 4 inch 22-gauge  touhy needle was introduced into the foramen @ left L4-5 and positioned in the posterior superior quarter of the foramen.  .5cc of Isovue [id number]  contrast was injected live in an AP fluoroscopic view at each level demonstrating appropriate needle position with contrast spread outlining the respective  left L4 nerve root and also medially into the epidural space without intravascular or intrathecal spread.  After negative aspiration 1cc from a  mixture of 40 mg Kenalog and  1mL sterile  preservative-free normal saline  was injected slowly and incrementally.  The needle was removed.  The patient tolerated the procedure well.  The patient was monitored after the procedure.  Patient was given post procedure and discharge instructions to  follow at home. The patient was discharged in a stable condition and accompanied by an adult .

## 2024-08-20 NOTE — PLAN OF CARE
Plan:  D/c pt via wheelchair at 1532  Informed pt if does not void in 8 hours to go to ER. Notify if redness, drainage, from injection site or fever over next 3-4 days. Rest and drink plenty of fluids for the remainder of the day. No lifting over 5 lbs. For the remainder of the day. Continue regular medications as prescribed. May take pain medications as prescribed.     Pain improved 100%  Pre-procedure pain: 5  Post-procedure pain: 0

## 2024-08-22 NOTE — PROGRESS NOTES
Subjective:         Patient ID: Marcell Dickson is a 54 y.o. female.    Chief Complaint: Hip Pain, Leg Pain, and Knee Pain      Pain  This is a chronic problem. The current episode started more than 1 year ago. The problem occurs daily. The problem has been gradually improving. Associated symptoms include arthralgias. Pertinent negatives include no anorexia, change in bowel habit, chest pain, chills, coughing, diaphoresis, fever, neck pain, sore throat, swollen glands, urinary symptoms, vertigo, visual change or vomiting.     Review of Systems   Constitutional:  Negative for activity change, appetite change, chills, diaphoresis, fever and unexpected weight change.   HENT:  Negative for drooling, ear discharge, ear pain, facial swelling, nosebleeds, sore throat, trouble swallowing, voice change and goiter.    Eyes:  Negative for photophobia, pain, discharge, redness and visual disturbance.   Respiratory:  Negative for apnea, cough, choking, chest tightness, shortness of breath, wheezing and stridor.    Cardiovascular:  Negative for chest pain, palpitations and leg swelling.   Gastrointestinal:  Negative for abdominal distention, anorexia, change in bowel habit, diarrhea, rectal pain, vomiting and fecal incontinence.   Endocrine: Negative for cold intolerance, heat intolerance, polydipsia, polyphagia and polyuria.   Genitourinary:  Negative for bladder incontinence, dysuria, flank pain, frequency and hot flashes.   Musculoskeletal:  Positive for arthralgias, back pain and leg pain. Negative for neck pain.   Integumentary:  Negative for color change and pallor.   Allergic/Immunologic: Negative for immunocompromised state.   Neurological:  Negative for dizziness, vertigo, seizures, syncope, facial asymmetry, speech difficulty, light-headedness, memory loss and coordination difficulties.   Hematological:  Negative for adenopathy. Does not bruise/bleed easily.   Psychiatric/Behavioral:  Negative for  agitation, behavioral problems, confusion, decreased concentration, dysphoric mood, hallucinations, self-injury and suicidal ideas. The patient is not nervous/anxious and is not hyperactive.            Past Medical History:   Diagnosis Date    Arthritis     GERD (gastroesophageal reflux disease)     History of rectal polyps 08/07/2018     Past Surgical History:   Procedure Laterality Date    breast reduction      CARPAL TUNNEL RELEASE Right 9/28/2023    Procedure: RELEASE, CARPAL TUNNEL;  Surgeon: Hong Rodriguez MD;  Location: [facility] OR;  Service: Orthopedics;  Laterality: Right;    CHOLECYSTECTOMY  10/28/2013    Dr. Paige    COLONOSCOPY  2018    DE QUERVAIN'S RELEASE Right 9/28/2023    Procedure: RELEASE, HAND, FOR DEQUERVAIN'S TENOSYNOVITIS;  Surgeon: Hong Rodriguez MD;  Location: [facility] OR;  Service: Orthopedics;  Laterality: Right;    EXCISION OF GANGLION OF WRIST Right 9/28/2023    Procedure: EXCISION, GANGLION CYST, WRIST;  Surgeon: Hong Rodriguez MD;  Location: [facility];  Service: Orthopedics;  Laterality: Right;    gastric sleeve      HYSTERECTOMY  10/28/2013    Robotic, BSO,Cystoscopy, TOT-    OOPHORECTOMY      TRANSFORAMINAL EPIDURAL INJECTION OF STEROID Left 8/20/2024    Procedure: Injection,steroid,epidural,transforaminal approach, L4/5;  Surgeon: Doreen Schulz MD;  Location: [facility];  Service: Pain Management;  Laterality: Left;    TUBAL LIGATION      tummy tuck       Social History     Socioeconomic History    Marital status:      Spouse name: Guillermo    Number of children: 4   Tobacco Use    Smoking status: Never     Passive exposure: Never    Smokeless tobacco: Never   Substance and Sexual Activity    Alcohol use: Yes     Comment: occasionally    Drug use: Never    Sexual activity: Yes     Partners: Male     Family History   Problem Relation Name Age of Onset    Heart failure Mother      Heart disease Father      Colon cancer  "Sister      Stomach cancer Brother Joel     Lung cancer Brother Joel      Review of patient's allergies indicates:   Allergen Reactions    Sulfa (sulfonamide antibiotics)         Objective:  Vitals:    09/09/24 1153   BP: 121/84   Pulse: 75   Resp: 18   Weight: 110.2 kg (243 lb)   Height: 5' 7" (1.702 m)   PainSc:   5             Physical Exam  Vitals and nursing note reviewed. Exam conducted with a chaperone present.   Constitutional:       General: She is awake. She is not in acute distress.     Appearance: Normal appearance. She is not ill-appearing, toxic-appearing or diaphoretic.   HENT:      Head: Normocephalic and atraumatic.      Nose: Nose normal.      Mouth/Throat:      Mouth: Mucous membranes are moist.      Pharynx: Oropharynx is clear.   Eyes:      Conjunctiva/sclera: Conjunctivae normal.      Pupils: Pupils are equal, round, and reactive to light.   Cardiovascular:      Rate and Rhythm: Normal rate.   Pulmonary:      Effort: Pulmonary effort is normal. No respiratory distress.   Abdominal:      Palpations: Abdomen is soft.      Tenderness: There is no guarding.   Musculoskeletal:         General: Normal range of motion.      Cervical back: Normal range of motion and neck supple. No rigidity.   Skin:     General: Skin is warm and dry.      Coloration: Skin is not jaundiced or pale.   Neurological:      General: No focal deficit present.      Mental Status: She is alert and oriented to person, place, and time. Mental status is at baseline.      Cranial Nerves: No cranial nerve deficit (II-XII).   Psychiatric:         Mood and Affect: Mood normal.         Behavior: Behavior normal. Behavior is cooperative.         Thought Content: Thought content normal.           FL Fluoro for Pain Management  See OP Notes for results.     IMPRESSION: See OP Notes for results.     This procedure was auto-finalized by: Virtual Radiologist       Office Visit on 06/17/2024   Component Date Value Ref Range Status    " Color, UA 06/17/2024 Yellow   Final    Spec Grav UA 06/17/2024 1.030   Final    pH, UA 06/17/2024 6.0   Final    WBC, UA 06/17/2024 nerg   Final    Nitrite, UA 06/17/2024 neg   Final    Protein, POC 06/17/2024 neg   Final    Glucose, UA 06/17/2024 neg   Final    Ketones, UA 06/17/2024 neg   Final    Bilirubin, POC 06/17/2024 neg   Final    Urobilinogen, UA 06/17/2024 1.0   Final    Blood, UA 06/17/2024 neg   Final   Abstract on 05/02/2024   Component Date Value Ref Range Status    BCS Recommendation External 03/18/2024 Repeat mammogram in 1 year   Final   Office Visit on 05/02/2024   Component Date Value Ref Range Status    Glucose, Fasting 05/02/2024 77  74 - 106 mg/dL Final    Triglycerides 05/02/2024 75  35 - 150 mg/dL Final    Cholesterol 05/02/2024 147  0 - 200 mg/dL Final    HDL Cholesterol 05/02/2024 57  40 - 60 mg/dL Final    Cholesterol/HDL Ratio (Risk Factor) 05/02/2024 2.6   Final    Non-HDL 05/02/2024 90  mg/dL Final    LDL Calculated 05/02/2024 75  mg/dL Final    LDL/HDL 05/02/2024 1.3   Final    VLDL 05/02/2024 15  mg/dL Final         No orders of the defined types were placed in this encounter.      Requested Prescriptions      No prescriptions requested or ordered in this encounter       Assessment:     1. Lumbar radiculopathy    2. Osteoarthrosis multiple sites, not specified as generalized               October 1, 2023 CT abdomen  Multiple simple cyst within the right kidney. The largest cyst measures up to 3.9 cm. Multiple simple cyst within the left kidney with the largest measuring up to 1.4 cm.     There are two 0.8 cm nodules located within the anterior left lower lobe, annual follow-up with CT of the chest without intravenous contrast recommended.     Subcentimeter nonobstructing stone right kidney.    MRI lumbar spine [facility] July 7, 2023 L5/S1 left-sided neuroforaminal stenosis greater than right, L4/5 neuroforaminal narrowing left greater than right disc bulge, L3/4 bilateral neural  foraminal stenosis multiple level degenerative changes  Large right renal cysts    X-ray sacroiliac joint [facility] May 15, 2023 degenerative changes no fracture noted    X-ray lumbar spine [facility] May 15, 2023 degenerative changes multiple level      Plan:    Not using narcotics from our office    Could not tolerate gabapentin gastric upset    Follow-up after lumbar L4/5 left side TFESI # 1 August 20, 2024  She states she had 100% relief after procedure  Procedure did help improve her lung function      Complaining of left knee pain worse with standing walking  Requesting Toradol injection    Toradol 60 mg IM tolerated well    Otherwise she would like to continue with conservative management    She states she takes her Lyrica will call for refill    Continue home exercise program as directed    Follow-up As needed, patient request    Dr. Schulz August 2025    Bring original prescription medication bottles/container/box with labels to each visit

## 2024-09-09 ENCOUNTER — OFFICE VISIT (OUTPATIENT)
Dept: PAIN MEDICINE | Facility: CLINIC | Age: 54
End: 2024-09-09
Payer: COMMERCIAL

## 2024-09-09 VITALS
DIASTOLIC BLOOD PRESSURE: 84 MMHG | BODY MASS INDEX: 38.14 KG/M2 | HEART RATE: 75 BPM | WEIGHT: 243 LBS | RESPIRATION RATE: 18 BRPM | SYSTOLIC BLOOD PRESSURE: 121 MMHG | HEIGHT: 67 IN

## 2024-09-09 DIAGNOSIS — M89.49 OSTEOARTHROSIS MULTIPLE SITES, NOT SPECIFIED AS GENERALIZED: Chronic | ICD-10-CM

## 2024-09-09 DIAGNOSIS — M54.16 LUMBAR RADICULOPATHY: Primary | Chronic | ICD-10-CM

## 2024-09-09 PROCEDURE — 99214 OFFICE O/P EST MOD 30 MIN: CPT | Mod: PBBFAC | Performed by: PHYSICIAN ASSISTANT

## 2024-09-09 PROCEDURE — 99999 PR PBB SHADOW E&M-EST. PATIENT-LVL IV: CPT | Mod: PBBFAC,,, | Performed by: PHYSICIAN ASSISTANT

## 2024-09-09 PROCEDURE — 1159F MED LIST DOCD IN RCRD: CPT | Mod: ,,, | Performed by: PHYSICIAN ASSISTANT

## 2024-09-09 PROCEDURE — 3074F SYST BP LT 130 MM HG: CPT | Mod: ,,, | Performed by: PHYSICIAN ASSISTANT

## 2024-09-09 PROCEDURE — 96372 THER/PROPH/DIAG INJ SC/IM: CPT | Mod: ,,, | Performed by: PHYSICIAN ASSISTANT

## 2024-09-09 PROCEDURE — 3044F HG A1C LEVEL LT 7.0%: CPT | Mod: ,,, | Performed by: PHYSICIAN ASSISTANT

## 2024-09-09 PROCEDURE — 3008F BODY MASS INDEX DOCD: CPT | Mod: ,,, | Performed by: PHYSICIAN ASSISTANT

## 2024-09-09 PROCEDURE — 99999PBSHW PR PBB SHADOW TECHNICAL ONLY FILED TO HB: Mod: PBBFAC,,,

## 2024-09-09 PROCEDURE — 3079F DIAST BP 80-89 MM HG: CPT | Mod: ,,, | Performed by: PHYSICIAN ASSISTANT

## 2024-09-09 PROCEDURE — 99214 OFFICE O/P EST MOD 30 MIN: CPT | Mod: S$PBB,25,, | Performed by: PHYSICIAN ASSISTANT

## 2024-09-09 RX ORDER — KETOROLAC TROMETHAMINE 30 MG/ML
60 INJECTION, SOLUTION INTRAMUSCULAR; INTRAVENOUS
Status: COMPLETED | OUTPATIENT
Start: 2024-09-09 | End: 2024-09-09

## 2024-09-09 RX ADMIN — KETOROLAC TROMETHAMINE 60 MG: 30 INJECTION, SOLUTION INTRAMUSCULAR at 12:09

## 2024-09-09 NOTE — LETTER
September 9, 2024      [facility]  [address] [place] [address] [place] [address]  Phone: [phone]       Patient: Marcell Dickson   YOB: 1970  Date of Visit: 09/09/2024    To Whom It May Concern:    Vern Dickson  was at [facility] on 09/09/2024. The patient may return to work/school on 09/09/24 with no restrictions. If you have any questions or concerns, or if I can be of further assistance, please do not hesitate to contact me.    Sincerely,    Teagan Howard RN

## 2024-09-10 DIAGNOSIS — F41.9 ANXIETY: ICD-10-CM

## 2024-09-10 RX ORDER — ESCITALOPRAM OXALATE 10 MG/1
10 TABLET ORAL DAILY
Qty: 30 TABLET | Refills: 0 | Status: SHIPPED | OUTPATIENT
Start: 2024-09-10

## 2024-10-16 ENCOUNTER — PATIENT MESSAGE (OUTPATIENT)
Dept: FAMILY MEDICINE | Facility: CLINIC | Age: 54
End: 2024-10-16
Payer: COMMERCIAL

## 2024-10-17 ENCOUNTER — OFFICE VISIT (OUTPATIENT)
Dept: OBSTETRICS AND GYNECOLOGY | Facility: CLINIC | Age: 54
End: 2024-10-17
Payer: COMMERCIAL

## 2024-10-17 VITALS
OXYGEN SATURATION: 98 % | HEART RATE: 96 BPM | RESPIRATION RATE: 16 BRPM | WEIGHT: 235 LBS | HEIGHT: 67 IN | SYSTOLIC BLOOD PRESSURE: 120 MMHG | DIASTOLIC BLOOD PRESSURE: 84 MMHG | BODY MASS INDEX: 36.88 KG/M2

## 2024-10-17 DIAGNOSIS — Z01.419 ENCOUNTER FOR WELL WOMAN EXAM WITH ROUTINE GYNECOLOGICAL EXAM: Primary | ICD-10-CM

## 2024-10-17 DIAGNOSIS — Z01.419 ROUTINE GYNECOLOGICAL EXAMINATION: ICD-10-CM

## 2024-10-17 DIAGNOSIS — Z12.72 VAGINAL PAP SMEAR: ICD-10-CM

## 2024-10-17 PROCEDURE — 3008F BODY MASS INDEX DOCD: CPT | Mod: ,,, | Performed by: STUDENT IN AN ORGANIZED HEALTH CARE EDUCATION/TRAINING PROGRAM

## 2024-10-17 PROCEDURE — 3074F SYST BP LT 130 MM HG: CPT | Mod: ,,, | Performed by: STUDENT IN AN ORGANIZED HEALTH CARE EDUCATION/TRAINING PROGRAM

## 2024-10-17 PROCEDURE — 99214 OFFICE O/P EST MOD 30 MIN: CPT | Mod: PBBFAC | Performed by: STUDENT IN AN ORGANIZED HEALTH CARE EDUCATION/TRAINING PROGRAM

## 2024-10-17 PROCEDURE — 88142 CYTOPATH C/V THIN LAYER: CPT | Mod: TC,GCY | Performed by: STUDENT IN AN ORGANIZED HEALTH CARE EDUCATION/TRAINING PROGRAM

## 2024-10-17 PROCEDURE — 3079F DIAST BP 80-89 MM HG: CPT | Mod: ,,, | Performed by: STUDENT IN AN ORGANIZED HEALTH CARE EDUCATION/TRAINING PROGRAM

## 2024-10-17 PROCEDURE — 99999 PR PBB SHADOW E&M-EST. PATIENT-LVL IV: CPT | Mod: PBBFAC,,, | Performed by: STUDENT IN AN ORGANIZED HEALTH CARE EDUCATION/TRAINING PROGRAM

## 2024-10-17 PROCEDURE — 99396 PREV VISIT EST AGE 40-64: CPT | Mod: S$PBB,,, | Performed by: STUDENT IN AN ORGANIZED HEALTH CARE EDUCATION/TRAINING PROGRAM

## 2024-10-17 PROCEDURE — 3044F HG A1C LEVEL LT 7.0%: CPT | Mod: ,,, | Performed by: STUDENT IN AN ORGANIZED HEALTH CARE EDUCATION/TRAINING PROGRAM

## 2024-10-17 RX ORDER — METFORMIN HYDROCHLORIDE 500 MG/1
500 TABLET, EXTENDED RELEASE ORAL 2 TIMES DAILY
COMMUNITY
Start: 2024-10-11

## 2024-10-17 NOTE — PROGRESS NOTES
"  Subjective:      Marcell TinocoHolaSamia is a 54 y.o. female who presents for an annual exam. Desires to continue yearly vaginal pap smears.    She reports her periods are: none    Complains of vaginitis No    Hot Flashes: none    Colon cancer screening 02/2024    MMG 03/2024    Pap 2023 NILM    Review of Systems  Pertinent ROS negative      Objective:      /84   Pulse 96   Resp 16   Ht 5' 7" (1.702 m)   Wt 106.6 kg (235 lb)   SpO2 98%   BMI 36.81 kg/m²     General Appearance:    Alert, cooperative, no distress, appears stated age   Breast Exam:    No tenderness, masses, or nipple abnormality   Abdomen:     Soft, non-tender, no masses   External Genitalia:    Normal female without lesion   Cervix:      Absent   Vagina:    Normal   Uterus:   Absent   Right Adnexa:   No masses or tenderness   Left Adnexa:   No masses or tenderness       .        Assessment:     Encounter Diagnoses   Name Primary?    Routine gynecological examination Yes    Vaginal Pap smear           Plan:       All questions answered.  Await pap smear results.  Return in one year or sooner prn     No orders of the defined types were placed in this encounter.      "

## 2024-10-21 ENCOUNTER — OFFICE VISIT (OUTPATIENT)
Dept: FAMILY MEDICINE | Facility: CLINIC | Age: 54
End: 2024-10-21
Payer: COMMERCIAL

## 2024-10-21 VITALS
HEIGHT: 67 IN | HEART RATE: 78 BPM | WEIGHT: 238.63 LBS | SYSTOLIC BLOOD PRESSURE: 104 MMHG | OXYGEN SATURATION: 97 % | RESPIRATION RATE: 18 BRPM | TEMPERATURE: 99 F | DIASTOLIC BLOOD PRESSURE: 66 MMHG | BODY MASS INDEX: 37.45 KG/M2

## 2024-10-21 DIAGNOSIS — Z23 FLU VACCINE NEED: ICD-10-CM

## 2024-10-21 DIAGNOSIS — K21.00 GASTROESOPHAGEAL REFLUX DISEASE WITH ESOPHAGITIS WITHOUT HEMORRHAGE: ICD-10-CM

## 2024-10-21 DIAGNOSIS — R91.1 PULMONARY NODULE: ICD-10-CM

## 2024-10-21 DIAGNOSIS — M54.17 LUMBOSACRAL RADICULOPATHY: Chronic | ICD-10-CM

## 2024-10-21 DIAGNOSIS — M25.562 CHRONIC PAIN OF LEFT KNEE: ICD-10-CM

## 2024-10-21 DIAGNOSIS — F41.9 ANXIETY: Primary | ICD-10-CM

## 2024-10-21 DIAGNOSIS — I10 PRIMARY HYPERTENSION: ICD-10-CM

## 2024-10-21 DIAGNOSIS — N28.1 RENAL CYST: ICD-10-CM

## 2024-10-21 DIAGNOSIS — M51.369 BULGING LUMBAR DISC: Chronic | ICD-10-CM

## 2024-10-21 DIAGNOSIS — M19.90 ARTHRITIS: Chronic | ICD-10-CM

## 2024-10-21 DIAGNOSIS — K44.9 HIATAL HERNIA: Chronic | ICD-10-CM

## 2024-10-21 DIAGNOSIS — G89.29 CHRONIC PAIN OF LEFT KNEE: ICD-10-CM

## 2024-10-21 PROCEDURE — 85025 COMPLETE CBC W/AUTO DIFF WBC: CPT | Mod: ,,, | Performed by: CLINICAL MEDICAL LABORATORY

## 2024-10-21 PROCEDURE — 1159F MED LIST DOCD IN RCRD: CPT | Mod: ,,, | Performed by: NURSE PRACTITIONER

## 2024-10-21 PROCEDURE — 99214 OFFICE O/P EST MOD 30 MIN: CPT | Mod: 25,,, | Performed by: NURSE PRACTITIONER

## 2024-10-21 PROCEDURE — 80053 COMPREHEN METABOLIC PANEL: CPT | Mod: ,,, | Performed by: CLINICAL MEDICAL LABORATORY

## 2024-10-21 PROCEDURE — 1160F RVW MEDS BY RX/DR IN RCRD: CPT | Mod: ,,, | Performed by: NURSE PRACTITIONER

## 2024-10-21 PROCEDURE — 3074F SYST BP LT 130 MM HG: CPT | Mod: ,,, | Performed by: NURSE PRACTITIONER

## 2024-10-21 PROCEDURE — 90471 IMMUNIZATION ADMIN: CPT | Mod: ,,, | Performed by: NURSE PRACTITIONER

## 2024-10-21 PROCEDURE — 80061 LIPID PANEL: CPT | Mod: ,,, | Performed by: CLINICAL MEDICAL LABORATORY

## 2024-10-21 PROCEDURE — 3044F HG A1C LEVEL LT 7.0%: CPT | Mod: ,,, | Performed by: NURSE PRACTITIONER

## 2024-10-21 PROCEDURE — 90656 IIV3 VACC NO PRSV 0.5 ML IM: CPT | Mod: ,,, | Performed by: NURSE PRACTITIONER

## 2024-10-21 PROCEDURE — 3008F BODY MASS INDEX DOCD: CPT | Mod: ,,, | Performed by: NURSE PRACTITIONER

## 2024-10-21 PROCEDURE — 3078F DIAST BP <80 MM HG: CPT | Mod: ,,, | Performed by: NURSE PRACTITIONER

## 2024-10-21 RX ORDER — HYDROXYZINE PAMOATE 50 MG/1
50 CAPSULE ORAL EVERY 8 HOURS PRN
Qty: 30 CAPSULE | Refills: 2 | Status: SHIPPED | OUTPATIENT
Start: 2024-10-21

## 2024-10-21 RX ORDER — CELECOXIB 100 MG/1
100 CAPSULE ORAL DAILY PRN
Qty: 20 CAPSULE | Refills: 0 | Status: SHIPPED | OUTPATIENT
Start: 2024-10-21

## 2024-10-21 RX ORDER — ESCITALOPRAM OXALATE 10 MG/1
10 TABLET ORAL DAILY
Qty: 90 TABLET | Refills: 1 | Status: SHIPPED | OUTPATIENT
Start: 2024-10-21

## 2024-10-21 RX ORDER — ESOMEPRAZOLE MAGNESIUM 40 MG/1
40 CAPSULE, DELAYED RELEASE ORAL
Qty: 90 CAPSULE | Refills: 1 | Status: SHIPPED | OUTPATIENT
Start: 2024-10-21 | End: 2025-10-21

## 2024-10-21 NOTE — PROGRESS NOTES
Clinic Note    Marcell Dickson is a 54 y.o. female     Chief Complaint:   Chief Complaint   Patient presents with    Hypertension     Med refill. Pt states she needs x-ray for kidney states it's been a year since last one     Immunizations     Flu shot          Subjective:    Patient comes in today for routine follow up and medication refills.   Patient reports doing well on anxiety meds. Denies adverse side effects. Symptoms controlled.   Patient states she has been buying nexium otc prn. Would like rx renewed.  Patient complains of arthritis. States primarily left knee. Denies direct injury. Denies hx of imaging of knee. Admits to swelling to lle. No calf tenderness, warmth, or erythema. Patient reports she has tried naproxen in the past but caused heart burn. Reports tried mobic but not effective.    Has chronic back pain that radiates to lle. Sees pain treatment [facility]/ Dr. Schulz. Has received injections.   Patient states she also needs yearly imaging of lung and kidney. Patient has hx of pulmonary nodules and renal cyst. Patient concerned about kidneys. Patient states she has to sit for while before able to urinate. Denies dysuria, frequency, or urgency.   Patient states she see specialty clinic for weight loss, wegovy.           Allergies:   Review of patient's allergies indicates:   Allergen Reactions    Sulfa (sulfonamide antibiotics)         Past Medical History:  Past Medical History:   Diagnosis Date    Arthritis     GERD (gastroesophageal reflux disease)     History of rectal polyps 08/07/2018        Current Medications:    Current Outpatient Medications:     cetirizine (ZYRTEC) 10 MG tablet, Take 1 tablet (10 mg total) by mouth once daily., Disp: 90 tablet, Rfl: 1    furosemide (LASIX) 20 MG tablet, Take 1 tablet (20 mg total) by mouth once daily., Disp: 90 tablet, Rfl: 1    hydroCHLOROthiazide (HYDRODIURIL) 25 MG tablet, TAKE 1 TABLET(25 MG) BY MOUTH DAILY, Disp: 90 tablet, Rfl: 1     linaCLOtide (LINZESS) 290 mcg Cap capsule, Take 1 capsule (290 mcg total) by mouth before breakfast., Disp: 90 capsule, Rfl: 1    metFORMIN (GLUCOPHAGE-XR) 500 MG ER 24hr tablet, Take 500 mg by mouth 2 (two) times daily., Disp: , Rfl:     progesterone (PROMETRIUM) 100 MG capsule, Take 100 mg by mouth every evening., Disp: , Rfl:     traZODone (DESYREL) 100 MG tablet, TAKE 1 TABLET(100 MG) BY MOUTH EVERY EVENING, Disp: 90 tablet, Rfl: 1    WEGOVY 1 mg/0.5 mL PnIj, Inject 1 mg into the skin every 7 days., Disp: , Rfl:     celecoxib (CELEBREX) 100 MG capsule, Take 1 capsule (100 mg total) by mouth daily as needed for Pain., Disp: 20 capsule, Rfl: 0    EScitalopram oxalate (LEXAPRO) 10 MG tablet, Take 1 tablet (10 mg total) by mouth once daily., Disp: 90 tablet, Rfl: 1    esomeprazole (NEXIUM) 40 MG capsule, Take 1 capsule (40 mg total) by mouth before breakfast., Disp: 90 capsule, Rfl: 1    hydrOXYzine pamoate (VISTARIL) 50 MG Cap, Take 1 capsule (50 mg total) by mouth every 8 (eight) hours as needed (anxiety/itching)., Disp: 30 capsule, Rfl: 2  No current facility-administered medications for this visit.       Review of Systems   Constitutional:  Negative for activity change and unexpected weight change.   HENT:  Negative for hearing loss, rhinorrhea and trouble swallowing.    Eyes:  Negative for discharge and visual disturbance.   Respiratory:  Negative for chest tightness and wheezing.    Cardiovascular:  Positive for leg swelling. Negative for chest pain and palpitations.   Gastrointestinal:  Negative for abdominal pain, blood in stool, constipation, diarrhea and vomiting.   Endocrine: Negative for polydipsia and polyuria.   Genitourinary:  Positive for difficulty urinating. Negative for dysuria, hematuria and menstrual problem.   Musculoskeletal:  Negative for arthralgias, joint swelling and neck pain.   Neurological:  Negative for weakness and headaches.   Psychiatric/Behavioral:  Negative for confusion,  "dysphoric mood, self-injury and suicidal ideas.           Objective:    /66 (BP Location: Left arm, Patient Position: Sitting)   Pulse 78   Temp 98.5 °F (36.9 °C) (Oral)   Resp 18   Ht 5' 7" (1.702 m)   Wt 108.2 kg (238 lb 9.6 oz)   SpO2 97%   BMI 37.37 kg/m²      Physical Exam  Constitutional:       Appearance: Normal appearance. She is obese.   Eyes:      Extraocular Movements: Extraocular movements intact.   Cardiovascular:      Rate and Rhythm: Normal rate and regular rhythm.      Pulses: Normal pulses.      Heart sounds: Normal heart sounds.   Pulmonary:      Effort: Pulmonary effort is normal.      Breath sounds: Normal breath sounds.   Abdominal:      Palpations: Abdomen is soft.      Tenderness: There is no abdominal tenderness. There is no guarding or rebound.   Musculoskeletal:      Left knee: Swelling present. Decreased range of motion. Tenderness present.      Right lower leg: No edema.      Left lower leg: Edema present.      Comments: Trace lle. Nonpitting   No erythema, warmth, or tenderness to lle   Neurological:      Mental Status: She is alert and oriented to person, place, and time.   Psychiatric:         Mood and Affect: Mood normal.          Assessment and Plan:    1. Anxiety    2. Flu vaccine need    3. Hiatal hernia    4. Gastroesophageal reflux disease with esophagitis without hemorrhage    5. Lumbosacral radiculopathy    6. Bulging lumbar disc    7. Arthritis    8. Renal cyst    9. Pulmonary nodule    10. Primary hypertension    11. Chronic pain of left knee         Anxiety  -     hydrOXYzine pamoate (VISTARIL) 50 MG Cap; Take 1 capsule (50 mg total) by mouth every 8 (eight) hours as needed (anxiety/itching).  Dispense: 30 capsule; Refill: 2  -     EScitalopram oxalate (LEXAPRO) 10 MG tablet; Take 1 tablet (10 mg total) by mouth once daily.  Dispense: 90 tablet; Refill: 1    Flu vaccine need  -     influenza (Flulaval, Fluzone, Fluarix) 45 mcg/0.5 mL IM vaccine (> or = 6 mo) 0.5 " mL    Hiatal hernia  -     esomeprazole (NEXIUM) 40 MG capsule; Take 1 capsule (40 mg total) by mouth before breakfast.  Dispense: 90 capsule; Refill: 1    Gastroesophageal reflux disease with esophagitis without hemorrhage  -     esomeprazole (NEXIUM) 40 MG capsule; Take 1 capsule (40 mg total) by mouth before breakfast.  Dispense: 90 capsule; Refill: 1    Lumbosacral radiculopathy  -     celecoxib (CELEBREX) 100 MG capsule; Take 1 capsule (100 mg total) by mouth daily as needed for Pain.  Dispense: 20 capsule; Refill: 0  -f/u with pain tx as scheduled    Bulging lumbar disc  -     celecoxib (CELEBREX) 100 MG capsule; Take 1 capsule (100 mg total) by mouth daily as needed for Pain.  Dispense: 20 capsule; Refill: 0  -f/u with pain tx as scheduled    Arthritis  -     celecoxib (CELEBREX) 100 MG capsule; Take 1 capsule (100 mg total) by mouth daily as needed for Pain.  Dispense: 20 capsule; Refill: 0  -new med. Discussed potential side effects and concerns  -     X-Ray Knee 1 or 2 View Left; Future; Expected date: 10/22/2024  -ice to knee prn  -elevate lle    Renal cyst  -     US Kidney; Future; Expected date: 10/22/2024  -renal cyst on previus us. Repeat to confirm no changes    Pulmonary nodule  -     CBC Auto Differential; Future; Expected date: 10/21/2024  -     Comprehensive Metabolic Panel; Future; Expected date: 10/21/2024  -     CT Chest Without Contrast; Future; Expected date: 10/22/2024  -CT previous recommend f/u in 6 month-1 year    Primary hypertension  -     Lipid Panel; Future; Expected date: 10/21/2024    Chronic pain of left knee  -     X-Ray Knee 1 or 2 View Left; Future; Expected date: 10/22/2024          There are no Patient Instructions on file for this visit.   Follow up in about 6 months (around 4/21/2025), or if symptoms worsen or fail to improve.

## 2024-10-22 LAB
ALBUMIN SERPL BCP-MCNC: 3.7 G/DL (ref 3.5–5)
ALBUMIN/GLOB SERPL: 1 {RATIO}
ALP SERPL-CCNC: 89 U/L (ref 46–118)
ALT SERPL W P-5'-P-CCNC: 31 U/L (ref 13–56)
ANION GAP SERPL CALCULATED.3IONS-SCNC: 8 MMOL/L (ref 7–16)
AST SERPL W P-5'-P-CCNC: 35 U/L (ref 15–37)
BASOPHILS # BLD AUTO: 0.03 K/UL (ref 0–0.2)
BASOPHILS NFR BLD AUTO: 0.5 % (ref 0–1)
BASOPHILS NFR BLD MANUAL: 1 % (ref 0–1)
BILIRUB SERPL-MCNC: 0.4 MG/DL (ref ?–1.2)
BUN SERPL-MCNC: 11 MG/DL (ref 7–18)
BUN/CREAT SERPL: 10 (ref 6–20)
CALCIUM SERPL-MCNC: 9.7 MG/DL (ref 8.5–10.1)
CHLORIDE SERPL-SCNC: 98 MMOL/L (ref 98–107)
CHOLEST SERPL-MCNC: 148 MG/DL (ref 0–200)
CHOLEST/HDLC SERPL: 2.5 {RATIO}
CO2 SERPL-SCNC: 32 MMOL/L (ref 21–32)
CREAT SERPL-MCNC: 1.12 MG/DL (ref 0.55–1.02)
DIFFERENTIAL METHOD BLD: ABNORMAL
EGFR (NO RACE VARIABLE) (RUSH/TITUS): 58 ML/MIN/1.73M2
EOSINOPHIL # BLD AUTO: 0.13 K/UL (ref 0–0.5)
EOSINOPHIL NFR BLD AUTO: 2.3 % (ref 1–4)
EOSINOPHIL NFR BLD MANUAL: 2 % (ref 1–4)
ERYTHROCYTE [DISTWIDTH] IN BLOOD BY AUTOMATED COUNT: 13.3 % (ref 11.5–14.5)
GLOBULIN SER-MCNC: 3.7 G/DL (ref 2–4)
GLUCOSE SERPL-MCNC: 84 MG/DL (ref 74–106)
HCT VFR BLD AUTO: 37 % (ref 38–47)
HDLC SERPL-MCNC: 60 MG/DL (ref 40–60)
HGB BLD-MCNC: 11.9 G/DL (ref 12–16)
IMM GRANULOCYTES # BLD AUTO: 0.01 K/UL (ref 0–0.04)
IMM GRANULOCYTES NFR BLD: 0.2 % (ref 0–0.4)
LDLC SERPL CALC-MCNC: 70 MG/DL
LDLC/HDLC SERPL: 1.2 {RATIO}
LYMPHOCYTES # BLD AUTO: 2.53 K/UL (ref 1–4.8)
LYMPHOCYTES NFR BLD AUTO: 44.2 % (ref 27–41)
LYMPHOCYTES NFR BLD MANUAL: 51 % (ref 27–41)
MCH RBC QN AUTO: 29.9 PG (ref 27–31)
MCHC RBC AUTO-ENTMCNC: 32.2 G/DL (ref 32–36)
MCV RBC AUTO: 93 FL (ref 80–96)
MONOCYTES # BLD AUTO: 0.59 K/UL (ref 0–0.8)
MONOCYTES NFR BLD AUTO: 10.3 % (ref 2–6)
MONOCYTES NFR BLD MANUAL: 9 % (ref 2–6)
MPC BLD CALC-MCNC: 12 FL (ref 9.4–12.4)
NEUTROPHILS # BLD AUTO: 2.44 K/UL (ref 1.8–7.7)
NEUTROPHILS NFR BLD AUTO: 42.5 % (ref 53–65)
NEUTS SEG NFR BLD MANUAL: 37 % (ref 50–62)
NONHDLC SERPL-MCNC: 88 MG/DL
NRBC # BLD AUTO: 0 X10E3/UL
NRBC, AUTO (.00): 0 %
PLATELET # BLD AUTO: 292 K/UL (ref 150–400)
PLATELET MORPHOLOGY: NORMAL
POTASSIUM SERPL-SCNC: 2.6 MMOL/L (ref 3.5–5.1)
PROT SERPL-MCNC: 7.4 G/DL (ref 6.4–8.2)
RBC # BLD AUTO: 3.98 M/UL (ref 4.2–5.4)
RBC MORPH BLD: NORMAL
SODIUM SERPL-SCNC: 135 MMOL/L (ref 136–145)
TRIGL SERPL-MCNC: 89 MG/DL (ref 35–150)
VLDLC SERPL-MCNC: 18 MG/DL
WBC # BLD AUTO: 5.73 K/UL (ref 4.5–11)

## 2024-10-23 ENCOUNTER — HOSPITAL ENCOUNTER (OUTPATIENT)
Dept: RADIOLOGY | Facility: HOSPITAL | Age: 54
Discharge: HOME | End: 2024-10-23
Attending: NURSE PRACTITIONER
Payer: COMMERCIAL

## 2024-10-23 DIAGNOSIS — G89.29 CHRONIC PAIN OF LEFT KNEE: ICD-10-CM

## 2024-10-23 DIAGNOSIS — M25.562 CHRONIC PAIN OF LEFT KNEE: ICD-10-CM

## 2024-10-23 DIAGNOSIS — M19.90 ARTHRITIS: Chronic | ICD-10-CM

## 2024-10-23 DIAGNOSIS — R91.1 PULMONARY NODULE: ICD-10-CM

## 2024-10-23 DIAGNOSIS — N28.1 RENAL CYST: ICD-10-CM

## 2024-10-23 PROCEDURE — 76775 US EXAM ABDO BACK WALL LIM: CPT | Mod: 26,,, | Performed by: RADIOLOGY

## 2024-10-23 PROCEDURE — 71250 CT THORAX DX C-: CPT | Mod: TC

## 2024-10-23 PROCEDURE — 76775 US EXAM ABDO BACK WALL LIM: CPT | Mod: TC

## 2024-10-23 PROCEDURE — 71250 CT THORAX DX C-: CPT | Mod: 26,,, | Performed by: RADIOLOGY

## 2024-10-23 PROCEDURE — 73560 X-RAY EXAM OF KNEE 1 OR 2: CPT | Mod: TC,LT

## 2024-10-23 PROCEDURE — 73560 X-RAY EXAM OF KNEE 1 OR 2: CPT | Mod: 26,LT,, | Performed by: RADIOLOGY

## 2024-10-28 ENCOUNTER — PATIENT MESSAGE (OUTPATIENT)
Dept: FAMILY MEDICINE | Facility: CLINIC | Age: 54
End: 2024-10-28
Payer: COMMERCIAL

## 2024-10-28 DIAGNOSIS — N28.1 RENAL CYST: Primary | ICD-10-CM

## 2024-10-28 DIAGNOSIS — E87.6 HYPOKALEMIA: Primary | ICD-10-CM

## 2024-10-28 DIAGNOSIS — R60.0 LOCALIZED EDEMA: ICD-10-CM

## 2024-10-28 RX ORDER — FUROSEMIDE 20 MG/1
20 TABLET ORAL DAILY
Qty: 90 TABLET | Refills: 1 | Status: SHIPPED | OUTPATIENT
Start: 2024-10-28 | End: 2025-10-28

## 2024-10-28 RX ORDER — POTASSIUM CHLORIDE 20 MEQ/1
20 TABLET, EXTENDED RELEASE ORAL DAILY
Qty: 90 TABLET | Refills: 1 | Status: SHIPPED | OUTPATIENT
Start: 2024-10-28

## 2024-10-29 DIAGNOSIS — N28.1 RENAL CYST: Primary | ICD-10-CM

## 2024-11-08 DIAGNOSIS — M19.90 ARTHRITIS: Chronic | ICD-10-CM

## 2024-11-08 DIAGNOSIS — M51.369 BULGING LUMBAR DISC: Chronic | ICD-10-CM

## 2024-11-08 DIAGNOSIS — M54.17 LUMBOSACRAL RADICULOPATHY: Chronic | ICD-10-CM

## 2024-11-08 RX ORDER — CELECOXIB 100 MG/1
CAPSULE ORAL
Qty: 20 CAPSULE | Refills: 0 | Status: SHIPPED | OUTPATIENT
Start: 2024-11-08

## 2024-11-30 DIAGNOSIS — M54.17 LUMBOSACRAL RADICULOPATHY: Chronic | ICD-10-CM

## 2024-11-30 DIAGNOSIS — M51.369 BULGING LUMBAR DISC: Chronic | ICD-10-CM

## 2024-11-30 DIAGNOSIS — M19.90 ARTHRITIS: Chronic | ICD-10-CM

## 2024-12-02 DIAGNOSIS — J30.89 SEASONAL ALLERGIC RHINITIS DUE TO OTHER ALLERGIC TRIGGER: Chronic | ICD-10-CM

## 2024-12-03 RX ORDER — CETIRIZINE HYDROCHLORIDE 10 MG/1
10 TABLET ORAL DAILY
Qty: 90 TABLET | Refills: 1 | Status: SHIPPED | OUTPATIENT
Start: 2024-12-03

## 2024-12-03 RX ORDER — CELECOXIB 100 MG/1
CAPSULE ORAL
Qty: 20 CAPSULE | Refills: 0 | Status: SHIPPED | OUTPATIENT
Start: 2024-12-03

## 2024-12-16 DIAGNOSIS — G47.09 OTHER INSOMNIA: Chronic | ICD-10-CM

## 2024-12-17 RX ORDER — TRAZODONE HYDROCHLORIDE 100 MG/1
100 TABLET ORAL NIGHTLY
Qty: 90 TABLET | Refills: 1 | Status: SHIPPED | OUTPATIENT
Start: 2024-12-17

## 2025-01-02 DIAGNOSIS — M51.369 BULGING LUMBAR DISC: Chronic | ICD-10-CM

## 2025-01-02 DIAGNOSIS — M19.90 ARTHRITIS: Chronic | ICD-10-CM

## 2025-01-02 DIAGNOSIS — M54.17 LUMBOSACRAL RADICULOPATHY: Chronic | ICD-10-CM

## 2025-01-06 RX ORDER — CELECOXIB 100 MG/1
100 CAPSULE ORAL DAILY PRN
Qty: 30 CAPSULE | Refills: 1 | Status: SHIPPED | OUTPATIENT
Start: 2025-01-06

## 2025-01-16 PROBLEM — M15.0 PRIMARY OSTEOARTHRITIS INVOLVING MULTIPLE JOINTS: Chronic | Status: ACTIVE | Noted: 2025-01-16

## 2025-01-26 DIAGNOSIS — F41.9 ANXIETY: ICD-10-CM

## 2025-01-27 ENCOUNTER — OFFICE VISIT (OUTPATIENT)
Dept: PAIN MEDICINE | Facility: CLINIC | Age: 55
End: 2025-01-27
Payer: COMMERCIAL

## 2025-01-27 VITALS
HEIGHT: 67 IN | DIASTOLIC BLOOD PRESSURE: 88 MMHG | BODY MASS INDEX: 36.73 KG/M2 | WEIGHT: 234 LBS | SYSTOLIC BLOOD PRESSURE: 132 MMHG | HEART RATE: 62 BPM | RESPIRATION RATE: 18 BRPM

## 2025-01-27 DIAGNOSIS — Z79.899 ENCOUNTER FOR LONG-TERM (CURRENT) USE OF MEDICATIONS: ICD-10-CM

## 2025-01-27 DIAGNOSIS — M54.16 LUMBAR RADICULOPATHY: Primary | Chronic | ICD-10-CM

## 2025-01-27 DIAGNOSIS — M54.12 CERVICAL RADICULOPATHY: Chronic | ICD-10-CM

## 2025-01-27 PROCEDURE — 99999PBSHW PR PBB SHADOW TECHNICAL ONLY FILED TO HB: Mod: JZ,PBBFAC,,

## 2025-01-27 PROCEDURE — 99215 OFFICE O/P EST HI 40 MIN: CPT | Mod: PBBFAC,25 | Performed by: PHYSICIAN ASSISTANT

## 2025-01-27 PROCEDURE — 1159F MED LIST DOCD IN RCRD: CPT | Mod: ,,, | Performed by: PHYSICIAN ASSISTANT

## 2025-01-27 PROCEDURE — 99999 PR PBB SHADOW E&M-EST. PATIENT-LVL V: CPT | Mod: PBBFAC,,, | Performed by: PHYSICIAN ASSISTANT

## 2025-01-27 PROCEDURE — 99214 OFFICE O/P EST MOD 30 MIN: CPT | Mod: S$PBB,25,, | Performed by: PHYSICIAN ASSISTANT

## 2025-01-27 PROCEDURE — 3008F BODY MASS INDEX DOCD: CPT | Mod: ,,, | Performed by: PHYSICIAN ASSISTANT

## 2025-01-27 PROCEDURE — 3075F SYST BP GE 130 - 139MM HG: CPT | Mod: ,,, | Performed by: PHYSICIAN ASSISTANT

## 2025-01-27 PROCEDURE — 3079F DIAST BP 80-89 MM HG: CPT | Mod: ,,, | Performed by: PHYSICIAN ASSISTANT

## 2025-01-27 PROCEDURE — 96372 THER/PROPH/DIAG INJ SC/IM: CPT | Mod: PBBFAC | Performed by: PHYSICIAN ASSISTANT

## 2025-01-27 RX ORDER — KETOROLAC TROMETHAMINE 30 MG/ML
60 INJECTION, SOLUTION INTRAMUSCULAR; INTRAVENOUS
Status: COMPLETED | OUTPATIENT
Start: 2025-01-27 | End: 2025-01-27

## 2025-01-27 RX ADMIN — KETOROLAC TROMETHAMINE 60 MG: 30 INJECTION, SOLUTION INTRAMUSCULAR at 09:01

## 2025-01-27 NOTE — PROGRESS NOTES
Subjective:         Patient ID: Marcell Dickson is a 54 y.o. female.    Chief Complaint: Low-back Pain and Leg Pain (left)      Pain  This is a chronic problem. The current episode started more than 1 year ago. The problem occurs daily. The problem has been waxing and waning. Associated symptoms include arthralgias. Pertinent negatives include no anorexia, change in bowel habit, chest pain, chills, coughing, diaphoresis, fever, neck pain, sore throat, swollen glands, urinary symptoms, vertigo, visual change or vomiting.     Review of Systems   Constitutional:  Negative for activity change, appetite change, chills, diaphoresis, fever and unexpected weight change.   HENT:  Negative for drooling, ear discharge, ear pain, facial swelling, nosebleeds, sore throat, trouble swallowing, voice change and goiter.    Eyes:  Negative for photophobia, pain, discharge, redness and visual disturbance.   Respiratory:  Negative for apnea, cough, choking, chest tightness, shortness of breath, wheezing and stridor.    Cardiovascular:  Negative for chest pain, palpitations and leg swelling.   Gastrointestinal:  Negative for abdominal distention, anorexia, change in bowel habit, diarrhea, rectal pain, vomiting and fecal incontinence.   Endocrine: Negative for cold intolerance, heat intolerance, polydipsia, polyphagia and polyuria.   Genitourinary:  Negative for bladder incontinence, dysuria, flank pain, frequency and hot flashes.   Musculoskeletal:  Positive for arthralgias, back pain and leg pain. Negative for neck pain.   Integumentary:  Negative for color change and pallor.   Allergic/Immunologic: Negative for immunocompromised state.   Neurological:  Negative for dizziness, vertigo, seizures, syncope, facial asymmetry, speech difficulty, light-headedness, memory loss and coordination difficulties.   Hematological:  Negative for adenopathy. Does not bruise/bleed easily.   Psychiatric/Behavioral:  Negative for agitation,  behavioral problems, confusion, decreased concentration, dysphoric mood, hallucinations, self-injury and suicidal ideas. The patient is not nervous/anxious and is not hyperactive.            Past Medical History:   Diagnosis Date    Arthritis     GERD (gastroesophageal reflux disease)     History of rectal polyps 08/07/2018     Past Surgical History:   Procedure Laterality Date    breast reduction      CARPAL TUNNEL RELEASE Right 9/28/2023    Procedure: RELEASE, CARPAL TUNNEL;  Surgeon: Hong Rodriguez MD;  Location: [facility] OR;  Service: Orthopedics;  Laterality: Right;    CHOLECYSTECTOMY  10/28/2013    Dr. Paige    COLONOSCOPY  2018    DE QUERVAIN'S RELEASE Right 9/28/2023    Procedure: RELEASE, HAND, FOR DEQUERVAIN'S TENOSYNOVITIS;  Surgeon: Hong Rodriguez MD;  Location: [facility] OR;  Service: Orthopedics;  Laterality: Right;    EXCISION OF GANGLION OF WRIST Right 9/28/2023    Procedure: EXCISION, GANGLION CYST, WRIST;  Surgeon: Hong Rodrgiuez MD;  Location: [facility] OR;  Service: Orthopedics;  Laterality: Right;    gastric sleeve      HYSTERECTOMY  10/28/2013    Robotic, BSO,Cystoscopy, TOT-    OOPHORECTOMY      TRANSFORAMINAL EPIDURAL INJECTION OF STEROID Left 8/20/2024    Procedure: Injection,steroid,epidural,transforaminal approach, L4/5;  Surgeon: Doreen Schulz MD;  Location: [facility];  Service: Pain Management;  Laterality: Left;    TUBAL LIGATION      tummy tuck       Social History     Socioeconomic History    Marital status:      Spouse name: Guillermo    Number of children: 4   Tobacco Use    Smoking status: Never     Passive exposure: Never    Smokeless tobacco: Never   Substance and Sexual Activity    Alcohol use: Yes     Comment: occasionally    Drug use: Never    Sexual activity: Yes     Partners: Male     Family History   Problem Relation Name Age of Onset    Heart failure Mother      Heart disease Father      Colon cancer Sister       "Stomach cancer Brother Joel     Lung cancer Brother Joel      Review of patient's allergies indicates:   Allergen Reactions    Sulfa (sulfonamide antibiotics)         Objective:  Vitals:    01/27/25 0842 01/27/25 0844   BP: 132/88    Pulse: 62    Resp: 18    Weight: 106.1 kg (234 lb)    Height: 5' 7" (1.702 m)    PainSc:   8   8               Physical Exam  Vitals and nursing note reviewed. Exam conducted with a chaperone present.   Constitutional:       General: She is awake. She is not in acute distress.     Appearance: Normal appearance. She is not ill-appearing, toxic-appearing or diaphoretic.   HENT:      Head: Normocephalic and atraumatic.      Nose: Nose normal.      Mouth/Throat:      Mouth: Mucous membranes are moist.      Pharynx: Oropharynx is clear.   Eyes:      Conjunctiva/sclera: Conjunctivae normal.      Pupils: Pupils are equal, round, and reactive to light.   Cardiovascular:      Rate and Rhythm: Normal rate.   Pulmonary:      Effort: Pulmonary effort is normal. No respiratory distress.   Abdominal:      Palpations: Abdomen is soft.      Tenderness: There is no guarding.   Musculoskeletal:         General: Normal range of motion.      Cervical back: Normal range of motion and neck supple. No rigidity.   Skin:     General: Skin is warm and dry.      Coloration: Skin is not jaundiced or pale.   Neurological:      General: No focal deficit present.      Mental Status: She is alert and oriented to person, place, and time. Mental status is at baseline.      Cranial Nerves: No cranial nerve deficit (II-XII).   Psychiatric:         Mood and Affect: Mood normal.         Behavior: Behavior normal. Behavior is cooperative.         Thought Content: Thought content normal.           CT Chest Without Contrast  Narrative: EXAMINATION:  CT CHEST WITHOUT CONTRAST    CLINICAL HISTORY:  pulmonary nodule; Solitary pulmonary nodule    TECHNIQUE:  Low dose axial images, sagittal and coronal reformations were " obtained from the thoracic inlet to the lung bases. Contrast was not administered.    COMPARISON:  CT 12/07/2023.    FINDINGS:  Chronic soft tissue nodules of the left upper lobe measuring 8 and 9 mm.  These are stable to minimally increased in size over the interval.  Right lung is clear.  No focal consolidation.  No pleural or pericardial effusions.  No significant axillary or intrathoracic lymphadenopathy.    Limited evaluation of the upper abdomen demonstrates a moderate sized hiatal hernia.  Prior gastric sleeve.  Previous cholecystectomy.  Suspected cyst of the left kidney.  Impression: 1. Pulmonary nodules.  Follow-up per Fleischner guidelines.  For multiple solid nodules with any 6 mm or greater, Fleischner Society guidelines recommend follow up with non-contrast chest CT at 3-6 months and 18-24 months after discovery.  2. Moderate sized hiatal hernia.  3. Prior gastric surgery.    Electronically signed by: Sammy Fan  Date:    10/23/2024  Time:    11:12  X-Ray Knee 1 or 2 View Left  Narrative: EXAMINATION:  XR KNEE 1 OR 2 VIEW LEFT    CLINICAL HISTORY:  Unspecified osteoarthritis, unspecified site    TECHNIQUE:  AP and lateral views of the left knee.    COMPARISON:  None    FINDINGS:  No acute fracture or dislocation.  No significant soft tissue swelling.    The joint spaces are preserved.    No significant suprapatellar effusion.  Impression: No acute radiographic findings of the left knee.    Electronically signed by: Sammy Fan  Date:    10/23/2024  Time:    11:08  US Kidney  Narrative: EXAMINATION:  US KIDNEY    CLINICAL HISTORY:  Cyst of kidney, acquired    TECHNIQUE:  Ultrasound of the kidneys was performed including color flow and Doppler evaluation of the kidneys.    COMPARISON:  Ultrasound 01/05/2024.    FINDINGS:  Right kidney: The right kidney measures 11.9 cm. No cortical thinning. No loss of corticomedullary distinction. Resistive index measures 0.62.  Chronic cysts the largest  measuring 4.4 cm.  No renal stone. No hydronephrosis.    Left kidney: The left kidney measures 11.3 cm. No cortical thinning. No loss of corticomedullary distinction. Resistive index measures 0.59.  Chronic cysts the largest measuring 1.8 cm.  No renal stone. No hydronephrosis.    Splenic artery resistive index 0.52.  Impression: Chronic bilateral renal cysts.    Electronically signed by: Sammy Fan  Date:    10/23/2024  Time:    11:08       Office Visit on 10/21/2024   Component Date Value Ref Range Status    Sodium 10/21/2024 135 (L)  136 - 145 mmol/L Final    Potassium 10/21/2024 2.6 (L)  3.5 - 5.1 mmol/L Final    Chloride 10/21/2024 98  98 - 107 mmol/L Final    CO2 10/21/2024 32  21 - 32 mmol/L Final    Anion Gap 10/21/2024 8  7 - 16 mmol/L Final    Glucose 10/21/2024 84  74 - 106 mg/dL Final    BUN 10/21/2024 11  7 - 18 mg/dL Final    Creatinine 10/21/2024 1.12 (H)  0.55 - 1.02 mg/dL Final    BUN/Creatinine Ratio 10/21/2024 10  6 - 20 Final    Calcium 10/21/2024 9.7  8.5 - 10.1 mg/dL Final    Total Protein 10/21/2024 7.4  6.4 - 8.2 g/dL Final    Albumin 10/21/2024 3.7  3.5 - 5.0 g/dL Final    Globulin 10/21/2024 3.7  2.0 - 4.0 g/dL Final    A/G Ratio 10/21/2024 1.0   Final    Bilirubin, Total 10/21/2024 0.4  >0.0 - 1.2 mg/dL Final    Alk Phos 10/21/2024 89  46 - 118 U/L Final    ALT 10/21/2024 31  13 - 56 U/L Final    AST 10/21/2024 35  15 - 37 U/L Final    eGFR 10/21/2024 58 (L)  >=60 mL/min/1.73m2 Final    Triglycerides 10/21/2024 89  35 - 150 mg/dL Final    Cholesterol 10/21/2024 148  0 - 200 mg/dL Final    HDL Cholesterol 10/21/2024 60  40 - 60 mg/dL Final    Cholesterol/HDL Ratio (Risk Factor) 10/21/2024 2.5   Final    Non-HDL 10/21/2024 88  mg/dL Final    LDL Calculated 10/21/2024 70  mg/dL Final    LDL/HDL 10/21/2024 1.2   Final    VLDL 10/21/2024 18  mg/dL Final    WBC 10/21/2024 5.73  4.50 - 11.00 K/uL Final    RBC 10/21/2024 3.98 (L)  4.20 - 5.40 M/uL Final    Hemoglobin 10/21/2024 11.9 (L)  12.0  - 16.0 g/dL Final    Hematocrit 10/21/2024 37.0 (L)  38.0 - 47.0 % Final    MCV 10/21/2024 93.0  80.0 - 96.0 fL Final    MCH 10/21/2024 29.9  27.0 - 31.0 pg Final    MCHC 10/21/2024 32.2  32.0 - 36.0 g/dL Final    RDW 10/21/2024 13.3  11.5 - 14.5 % Final    Platelet Count 10/21/2024 292  150 - 400 K/uL Final    MPV 10/21/2024 12.0  9.4 - 12.4 fL Final    Neutrophils % 10/21/2024 42.5 (L)  53.0 - 65.0 % Final    Lymphocytes % 10/21/2024 44.2 (H)  27.0 - 41.0 % Final    Monocytes % 10/21/2024 10.3 (H)  2.0 - 6.0 % Final    Eosinophils % 10/21/2024 2.3  1.0 - 4.0 % Final    Basophils % 10/21/2024 0.5  0.0 - 1.0 % Final    Immature Granulocytes % 10/21/2024 0.2  0.0 - 0.4 % Final    nRBC, Auto 10/21/2024 0.0  <=0.0 % Final    Neutrophils, Abs 10/21/2024 2.44  1.80 - 7.70 K/uL Final    Lymphocytes, Absolute 10/21/2024 2.53  1.00 - 4.80 K/uL Final    Monocytes, Absolute 10/21/2024 0.59  0.00 - 0.80 K/uL Final    Eosinophils, Absolute 10/21/2024 0.13  0.00 - 0.50 K/uL Final    Basophils, Absolute 10/21/2024 0.03  0.00 - 0.20 K/uL Final    Immature Granulocytes, Absolute 10/21/2024 0.01  0.00 - 0.04 K/uL Final    nRBC, Absolute 10/21/2024 0.00  <=0.00 x10e3/uL Final    Diff Type 10/21/2024 Manual   Final    Segmented Neutrophils, Man % 10/21/2024 37 (L)  50 - 62 % Final    Lymphocytes, Man % 10/21/2024 51 (H)  27 - 41 % Final    Monocytes, Man % 10/21/2024 9 (H)  2 - 6 % Final    Eosinophils, Man % 10/21/2024 2  1 - 4 % Final    Basophils, Man % 10/21/2024 1  0 - 1 % Final    Platelet Morphology 10/21/2024 Normal  Normal Final    RBC Morphology 10/21/2024 Normal   Final   Office Visit on 10/17/2024   Component Date Value Ref Range Status    Case Report 10/17/2024    Final                    Value:Pap Cytology                                      Case: [id number]                                   Authorizing Provider:  Paolo Owens DO  Collected:           10/17/2024 04:04 PM          Ordering Location:     [facility] Received:            10/18/2024 08:12 AM                                 - Obstetrics And                                                                                    Gynecology                                                                   First Screen:          Heather Schulz, CT(ASCP)                                                       Specimen:    Liquid-Based Pap Test, Screening, Vagina                                                   Interpretation 10/17/2024 Negative              Final    General Categorization 10/17/2024 Negative for intraepithelial lesion or malignancy   Final    Specimen Adequacy 10/17/2024 Satisfactory for evaluation   Final    Clinical Information 10/17/2024    Final                    Value:N/A    Disclaimer 10/17/2024    Final                    Value:Notice: An irreducible false negative rate exists with pap smears, therefore a negative result does not absolutely exclude malignancy.           Orders Placed This Encounter   Procedures    Case Request Operating Room: Injection,steroid,epidural,transforaminal approach     Order Specific Question:   Medical Necessity:     Answer:   Medically Non-Urgent [100]     Order Specific Question:   Case classification     Answer:   E - Elective [90]     Order Specific Question:   Is an on-site pathologist required for this procedure?     Answer:   N/A       Requested Prescriptions      No prescriptions requested or ordered in this encounter       Assessment:     1. Lumbar radiculopathy    2. Cervical radiculopathy    3. Encounter for long-term (current) use of medications                 October 1, 2023 CT abdomen  Multiple simple cyst within the right kidney. The largest cyst measures up to 3.9 cm. Multiple simple cyst within the left kidney with the largest measuring up to 1.4 cm.     There are two 0.8 cm nodules located within the anterior left lower lobe, annual follow-up with CT of the chest without intravenous  contrast recommended.     Subcentimeter nonobstructing stone right kidney.    MRI lumbar spine [facility] July 7, 2023 L5/S1 left-sided neuroforaminal stenosis greater than right, L4/5 neuroforaminal narrowing left greater than right disc bulge, L3/4 bilateral neural foraminal stenosis multiple level degenerative changes  Large right renal cysts    X-ray sacroiliac joint [facility] May 15, 2023 degenerative changes no fracture noted    X-ray lumbar spine [facility] May 15, 2023 degenerative changes multiple level      Plan:    Last seen in clinic September 2024      Not using narcotics from our office    Could not tolerate gabapentin gastric upset    She had lumbar L4/5 left side TFESI # 1 August 20, 2024  She states she had 100% relief after procedure  Procedure did help improve her lung function      Complaint of back pain buttock and leg pain numbness and tingling left greater than right radicular in nature ongoing for more than 3 months    Requesting Toradol injection    Requesting lumbar procedure for lumbar radiculopathy    Continue home exercise program as directed    Patient will/shall continue home exercise program as directed ongoing x1 year, 3-4 days per week 30-45 minutes per session  Patient has been doing home exercises since last procedure Date --------  Stretching Exercises   Stretching exercises keep your muscles flexible. They also stop them from getting tight. Start by doing each of these stretches 2 to 3 times. In order for your body to make changes, you will need to hold these stretches for 20 to 30 seconds. Try to do the stretches 2 to 3 times each day.   Do all exercises slowly.   Do not bounce when doing stretches.    Single knee to chest stretches ? Lie on your back, bend your knees and have your feet flat on the floor. Pull one knee towards your chest until you feel a stretch in your lower back and buttock area. Repeat with the other knee. If you have knee problems, pull your knee  up by grabbing the back of your thigh instead of the front of your knee. You can also do this exercise by grabbing both knees at the same time.    Lower trunk rotations ? While lying on your back, bend your knees and have your feet flat on the floor. Keep your legs together and then drop them to one side. Be sure to keep both of your shoulders touching the floor until you feel a stretch in the muscles at the side of the back. Repeat on the other side.    Lower back stretches seated ? Sit in a chair with your feet spread about shoulder width apart. Then, lean forward until you feel a stretch in your lower back.  Strengthening Exercises   Strengthening exercises keep your muscles firm and strong. Start by repeating each exercise 2 to 3 times.     Work up to doing each exercise 10 times.     Hold each exercise for 3 to 5 seconds. Try to do the exercises 2 to 3 times each day.     Do all exercises slowly.    Shoulder blade squeezes ? Pinch your shoulder blades together on your upper back and hold 3 to 5 seconds. Be sure you are sitting with good posture and make sure your shoulders do not raise up when you do this exercise. Relax.    Pelvic tilts ? Lie on your back with your knees bent and feet flat on the floor. Tighten your stomach muscles and press your lower back down to the floor. Relax.    Hip lifts ? Lie on your back with your knees bent and feet flat on the floor. Tighten your stomach muscles and lift your buttocks off the floor. Relax.    Please see detailed exercises attached to note with diagram    Schedule lumbar L4/5 TFESI # 2, lumbar radiculopathy    Dr. Schulz     Bring original prescription medication bottles/container/box with labels to each visit

## 2025-01-27 NOTE — H&P (VIEW-ONLY)
Subjective:         Patient ID: Marcell Dickson is a 54 y.o. female.    Chief Complaint: Low-back Pain and Leg Pain (left)      Pain  This is a chronic problem. The current episode started more than 1 year ago. The problem occurs daily. The problem has been waxing and waning. Associated symptoms include arthralgias. Pertinent negatives include no anorexia, change in bowel habit, chest pain, chills, coughing, diaphoresis, fever, neck pain, sore throat, swollen glands, urinary symptoms, vertigo, visual change or vomiting.     Review of Systems   Constitutional:  Negative for activity change, appetite change, chills, diaphoresis, fever and unexpected weight change.   HENT:  Negative for drooling, ear discharge, ear pain, facial swelling, nosebleeds, sore throat, trouble swallowing, voice change and goiter.    Eyes:  Negative for photophobia, pain, discharge, redness and visual disturbance.   Respiratory:  Negative for apnea, cough, choking, chest tightness, shortness of breath, wheezing and stridor.    Cardiovascular:  Negative for chest pain, palpitations and leg swelling.   Gastrointestinal:  Negative for abdominal distention, anorexia, change in bowel habit, diarrhea, rectal pain, vomiting and fecal incontinence.   Endocrine: Negative for cold intolerance, heat intolerance, polydipsia, polyphagia and polyuria.   Genitourinary:  Negative for bladder incontinence, dysuria, flank pain, frequency and hot flashes.   Musculoskeletal:  Positive for arthralgias, back pain and leg pain. Negative for neck pain.   Integumentary:  Negative for color change and pallor.   Allergic/Immunologic: Negative for immunocompromised state.   Neurological:  Negative for dizziness, vertigo, seizures, syncope, facial asymmetry, speech difficulty, light-headedness, memory loss and coordination difficulties.   Hematological:  Negative for adenopathy. Does not bruise/bleed easily.   Psychiatric/Behavioral:  Negative for agitation,  behavioral problems, confusion, decreased concentration, dysphoric mood, hallucinations, self-injury and suicidal ideas. The patient is not nervous/anxious and is not hyperactive.            Past Medical History:   Diagnosis Date    Arthritis     GERD (gastroesophageal reflux disease)     History of rectal polyps 08/07/2018     Past Surgical History:   Procedure Laterality Date    breast reduction      CARPAL TUNNEL RELEASE Right 9/28/2023    Procedure: RELEASE, CARPAL TUNNEL;  Surgeon: Hong Rodriguez MD;  Location: [facility] OR;  Service: Orthopedics;  Laterality: Right;    CHOLECYSTECTOMY  10/28/2013    Dr. Paige    COLONOSCOPY  2018    DE QUERVAIN'S RELEASE Right 9/28/2023    Procedure: RELEASE, HAND, FOR DEQUERVAIN'S TENOSYNOVITIS;  Surgeon: Hnog Rodriguez MD;  Location: [facility] OR;  Service: Orthopedics;  Laterality: Right;    EXCISION OF GANGLION OF WRIST Right 9/28/2023    Procedure: EXCISION, GANGLION CYST, WRIST;  Surgeon: Hong Rodriguez MD;  Location: [facility] OR;  Service: Orthopedics;  Laterality: Right;    gastric sleeve      HYSTERECTOMY  10/28/2013    Robotic, BSO,Cystoscopy, TOT-    OOPHORECTOMY      TRANSFORAMINAL EPIDURAL INJECTION OF STEROID Left 8/20/2024    Procedure: Injection,steroid,epidural,transforaminal approach, L4/5;  Surgeon: Doreen Schulz MD;  Location: [facility];  Service: Pain Management;  Laterality: Left;    TUBAL LIGATION      tummy tuck       Social History     Socioeconomic History    Marital status:      Spouse name: Guillermo    Number of children: 4   Tobacco Use    Smoking status: Never     Passive exposure: Never    Smokeless tobacco: Never   Substance and Sexual Activity    Alcohol use: Yes     Comment: occasionally    Drug use: Never    Sexual activity: Yes     Partners: Male     Family History   Problem Relation Name Age of Onset    Heart failure Mother      Heart disease Father      Colon cancer Sister       "Stomach cancer Brother Joel     Lung cancer Brother Joel      Review of patient's allergies indicates:   Allergen Reactions    Sulfa (sulfonamide antibiotics)         Objective:  Vitals:    01/27/25 0842 01/27/25 0844   BP: 132/88    Pulse: 62    Resp: 18    Weight: 106.1 kg (234 lb)    Height: 5' 7" (1.702 m)    PainSc:   8   8               Physical Exam  Vitals and nursing note reviewed. Exam conducted with a chaperone present.   Constitutional:       General: She is awake. She is not in acute distress.     Appearance: Normal appearance. She is not ill-appearing, toxic-appearing or diaphoretic.   HENT:      Head: Normocephalic and atraumatic.      Nose: Nose normal.      Mouth/Throat:      Mouth: Mucous membranes are moist.      Pharynx: Oropharynx is clear.   Eyes:      Conjunctiva/sclera: Conjunctivae normal.      Pupils: Pupils are equal, round, and reactive to light.   Cardiovascular:      Rate and Rhythm: Normal rate.   Pulmonary:      Effort: Pulmonary effort is normal. No respiratory distress.   Abdominal:      Palpations: Abdomen is soft.      Tenderness: There is no guarding.   Musculoskeletal:         General: Normal range of motion.      Cervical back: Normal range of motion and neck supple. No rigidity.   Skin:     General: Skin is warm and dry.      Coloration: Skin is not jaundiced or pale.   Neurological:      General: No focal deficit present.      Mental Status: She is alert and oriented to person, place, and time. Mental status is at baseline.      Cranial Nerves: No cranial nerve deficit (II-XII).   Psychiatric:         Mood and Affect: Mood normal.         Behavior: Behavior normal. Behavior is cooperative.         Thought Content: Thought content normal.           CT Chest Without Contrast  Narrative: EXAMINATION:  CT CHEST WITHOUT CONTRAST    CLINICAL HISTORY:  pulmonary nodule; Solitary pulmonary nodule    TECHNIQUE:  Low dose axial images, sagittal and coronal reformations were " obtained from the thoracic inlet to the lung bases. Contrast was not administered.    COMPARISON:  CT 12/07/2023.    FINDINGS:  Chronic soft tissue nodules of the left upper lobe measuring 8 and 9 mm.  These are stable to minimally increased in size over the interval.  Right lung is clear.  No focal consolidation.  No pleural or pericardial effusions.  No significant axillary or intrathoracic lymphadenopathy.    Limited evaluation of the upper abdomen demonstrates a moderate sized hiatal hernia.  Prior gastric sleeve.  Previous cholecystectomy.  Suspected cyst of the left kidney.  Impression: 1. Pulmonary nodules.  Follow-up per Fleischner guidelines.  For multiple solid nodules with any 6 mm or greater, Fleischner Society guidelines recommend follow up with non-contrast chest CT at 3-6 months and 18-24 months after discovery.  2. Moderate sized hiatal hernia.  3. Prior gastric surgery.    Electronically signed by: Sammy Fan  Date:    10/23/2024  Time:    11:12  X-Ray Knee 1 or 2 View Left  Narrative: EXAMINATION:  XR KNEE 1 OR 2 VIEW LEFT    CLINICAL HISTORY:  Unspecified osteoarthritis, unspecified site    TECHNIQUE:  AP and lateral views of the left knee.    COMPARISON:  None    FINDINGS:  No acute fracture or dislocation.  No significant soft tissue swelling.    The joint spaces are preserved.    No significant suprapatellar effusion.  Impression: No acute radiographic findings of the left knee.    Electronically signed by: Sammy Fan  Date:    10/23/2024  Time:    11:08  US Kidney  Narrative: EXAMINATION:  US KIDNEY    CLINICAL HISTORY:  Cyst of kidney, acquired    TECHNIQUE:  Ultrasound of the kidneys was performed including color flow and Doppler evaluation of the kidneys.    COMPARISON:  Ultrasound 01/05/2024.    FINDINGS:  Right kidney: The right kidney measures 11.9 cm. No cortical thinning. No loss of corticomedullary distinction. Resistive index measures 0.62.  Chronic cysts the largest  measuring 4.4 cm.  No renal stone. No hydronephrosis.    Left kidney: The left kidney measures 11.3 cm. No cortical thinning. No loss of corticomedullary distinction. Resistive index measures 0.59.  Chronic cysts the largest measuring 1.8 cm.  No renal stone. No hydronephrosis.    Splenic artery resistive index 0.52.  Impression: Chronic bilateral renal cysts.    Electronically signed by: Sammy Fan  Date:    10/23/2024  Time:    11:08       Office Visit on 10/21/2024   Component Date Value Ref Range Status    Sodium 10/21/2024 135 (L)  136 - 145 mmol/L Final    Potassium 10/21/2024 2.6 (L)  3.5 - 5.1 mmol/L Final    Chloride 10/21/2024 98  98 - 107 mmol/L Final    CO2 10/21/2024 32  21 - 32 mmol/L Final    Anion Gap 10/21/2024 8  7 - 16 mmol/L Final    Glucose 10/21/2024 84  74 - 106 mg/dL Final    BUN 10/21/2024 11  7 - 18 mg/dL Final    Creatinine 10/21/2024 1.12 (H)  0.55 - 1.02 mg/dL Final    BUN/Creatinine Ratio 10/21/2024 10  6 - 20 Final    Calcium 10/21/2024 9.7  8.5 - 10.1 mg/dL Final    Total Protein 10/21/2024 7.4  6.4 - 8.2 g/dL Final    Albumin 10/21/2024 3.7  3.5 - 5.0 g/dL Final    Globulin 10/21/2024 3.7  2.0 - 4.0 g/dL Final    A/G Ratio 10/21/2024 1.0   Final    Bilirubin, Total 10/21/2024 0.4  >0.0 - 1.2 mg/dL Final    Alk Phos 10/21/2024 89  46 - 118 U/L Final    ALT 10/21/2024 31  13 - 56 U/L Final    AST 10/21/2024 35  15 - 37 U/L Final    eGFR 10/21/2024 58 (L)  >=60 mL/min/1.73m2 Final    Triglycerides 10/21/2024 89  35 - 150 mg/dL Final    Cholesterol 10/21/2024 148  0 - 200 mg/dL Final    HDL Cholesterol 10/21/2024 60  40 - 60 mg/dL Final    Cholesterol/HDL Ratio (Risk Factor) 10/21/2024 2.5   Final    Non-HDL 10/21/2024 88  mg/dL Final    LDL Calculated 10/21/2024 70  mg/dL Final    LDL/HDL 10/21/2024 1.2   Final    VLDL 10/21/2024 18  mg/dL Final    WBC 10/21/2024 5.73  4.50 - 11.00 K/uL Final    RBC 10/21/2024 3.98 (L)  4.20 - 5.40 M/uL Final    Hemoglobin 10/21/2024 11.9 (L)  12.0  - 16.0 g/dL Final    Hematocrit 10/21/2024 37.0 (L)  38.0 - 47.0 % Final    MCV 10/21/2024 93.0  80.0 - 96.0 fL Final    MCH 10/21/2024 29.9  27.0 - 31.0 pg Final    MCHC 10/21/2024 32.2  32.0 - 36.0 g/dL Final    RDW 10/21/2024 13.3  11.5 - 14.5 % Final    Platelet Count 10/21/2024 292  150 - 400 K/uL Final    MPV 10/21/2024 12.0  9.4 - 12.4 fL Final    Neutrophils % 10/21/2024 42.5 (L)  53.0 - 65.0 % Final    Lymphocytes % 10/21/2024 44.2 (H)  27.0 - 41.0 % Final    Monocytes % 10/21/2024 10.3 (H)  2.0 - 6.0 % Final    Eosinophils % 10/21/2024 2.3  1.0 - 4.0 % Final    Basophils % 10/21/2024 0.5  0.0 - 1.0 % Final    Immature Granulocytes % 10/21/2024 0.2  0.0 - 0.4 % Final    nRBC, Auto 10/21/2024 0.0  <=0.0 % Final    Neutrophils, Abs 10/21/2024 2.44  1.80 - 7.70 K/uL Final    Lymphocytes, Absolute 10/21/2024 2.53  1.00 - 4.80 K/uL Final    Monocytes, Absolute 10/21/2024 0.59  0.00 - 0.80 K/uL Final    Eosinophils, Absolute 10/21/2024 0.13  0.00 - 0.50 K/uL Final    Basophils, Absolute 10/21/2024 0.03  0.00 - 0.20 K/uL Final    Immature Granulocytes, Absolute 10/21/2024 0.01  0.00 - 0.04 K/uL Final    nRBC, Absolute 10/21/2024 0.00  <=0.00 x10e3/uL Final    Diff Type 10/21/2024 Manual   Final    Segmented Neutrophils, Man % 10/21/2024 37 (L)  50 - 62 % Final    Lymphocytes, Man % 10/21/2024 51 (H)  27 - 41 % Final    Monocytes, Man % 10/21/2024 9 (H)  2 - 6 % Final    Eosinophils, Man % 10/21/2024 2  1 - 4 % Final    Basophils, Man % 10/21/2024 1  0 - 1 % Final    Platelet Morphology 10/21/2024 Normal  Normal Final    RBC Morphology 10/21/2024 Normal   Final   Office Visit on 10/17/2024   Component Date Value Ref Range Status    Case Report 10/17/2024    Final                    Value:Pap Cytology                                      Case: [id number]                                   Authorizing Provider:  Paolo Owens DO  Collected:           10/17/2024 04:04 PM          Ordering Location:     [facility] Received:            10/18/2024 08:12 AM                                 - Obstetrics And                                                                                    Gynecology                                                                   First Screen:          Heather Schulz, CT(ASCP)                                                       Specimen:    Liquid-Based Pap Test, Screening, Vagina                                                   Interpretation 10/17/2024 Negative              Final    General Categorization 10/17/2024 Negative for intraepithelial lesion or malignancy   Final    Specimen Adequacy 10/17/2024 Satisfactory for evaluation   Final    Clinical Information 10/17/2024    Final                    Value:N/A    Disclaimer 10/17/2024    Final                    Value:Notice: An irreducible false negative rate exists with pap smears, therefore a negative result does not absolutely exclude malignancy.           Orders Placed This Encounter   Procedures    Case Request Operating Room: Injection,steroid,epidural,transforaminal approach     Order Specific Question:   Medical Necessity:     Answer:   Medically Non-Urgent [100]     Order Specific Question:   Case classification     Answer:   E - Elective [90]     Order Specific Question:   Is an on-site pathologist required for this procedure?     Answer:   N/A       Requested Prescriptions      No prescriptions requested or ordered in this encounter       Assessment:     1. Lumbar radiculopathy    2. Cervical radiculopathy    3. Encounter for long-term (current) use of medications                 October 1, 2023 CT abdomen  Multiple simple cyst within the right kidney. The largest cyst measures up to 3.9 cm. Multiple simple cyst within the left kidney with the largest measuring up to 1.4 cm.     There are two 0.8 cm nodules located within the anterior left lower lobe, annual follow-up with CT of the chest without intravenous  contrast recommended.     Subcentimeter nonobstructing stone right kidney.    MRI lumbar spine [facility] July 7, 2023 L5/S1 left-sided neuroforaminal stenosis greater than right, L4/5 neuroforaminal narrowing left greater than right disc bulge, L3/4 bilateral neural foraminal stenosis multiple level degenerative changes  Large right renal cysts    X-ray sacroiliac joint [facility] May 15, 2023 degenerative changes no fracture noted    X-ray lumbar spine [facility] May 15, 2023 degenerative changes multiple level      Plan:    Last seen in clinic September 2024      Not using narcotics from our office    Could not tolerate gabapentin gastric upset    She had lumbar L4/5 left side TFESI # 1 August 20, 2024  She states she had 100% relief after procedure  Procedure did help improve her lung function      Complaint of back pain buttock and leg pain numbness and tingling left greater than right radicular in nature ongoing for more than 3 months    Requesting Toradol injection    Requesting lumbar procedure for lumbar radiculopathy    Continue home exercise program as directed    Patient will/shall continue home exercise program as directed ongoing x1 year, 3-4 days per week 30-45 minutes per session  Patient has been doing home exercises since last procedure Date --------  Stretching Exercises   Stretching exercises keep your muscles flexible. They also stop them from getting tight. Start by doing each of these stretches 2 to 3 times. In order for your body to make changes, you will need to hold these stretches for 20 to 30 seconds. Try to do the stretches 2 to 3 times each day.   Do all exercises slowly.   Do not bounce when doing stretches.    Single knee to chest stretches ? Lie on your back, bend your knees and have your feet flat on the floor. Pull one knee towards your chest until you feel a stretch in your lower back and buttock area. Repeat with the other knee. If you have knee problems, pull your knee  up by grabbing the back of your thigh instead of the front of your knee. You can also do this exercise by grabbing both knees at the same time.    Lower trunk rotations ? While lying on your back, bend your knees and have your feet flat on the floor. Keep your legs together and then drop them to one side. Be sure to keep both of your shoulders touching the floor until you feel a stretch in the muscles at the side of the back. Repeat on the other side.    Lower back stretches seated ? Sit in a chair with your feet spread about shoulder width apart. Then, lean forward until you feel a stretch in your lower back.  Strengthening Exercises   Strengthening exercises keep your muscles firm and strong. Start by repeating each exercise 2 to 3 times.     Work up to doing each exercise 10 times.     Hold each exercise for 3 to 5 seconds. Try to do the exercises 2 to 3 times each day.     Do all exercises slowly.    Shoulder blade squeezes ? Pinch your shoulder blades together on your upper back and hold 3 to 5 seconds. Be sure you are sitting with good posture and make sure your shoulders do not raise up when you do this exercise. Relax.    Pelvic tilts ? Lie on your back with your knees bent and feet flat on the floor. Tighten your stomach muscles and press your lower back down to the floor. Relax.    Hip lifts ? Lie on your back with your knees bent and feet flat on the floor. Tighten your stomach muscles and lift your buttocks off the floor. Relax.    Please see detailed exercises attached to note with diagram    Schedule lumbar L4/5 TFESI # 2, lumbar radiculopathy    Dr. Schulz     Bring original prescription medication bottles/container/box with labels to each visit

## 2025-01-27 NOTE — PATIENT INSTRUCTIONS

## 2025-01-28 RX ORDER — ESCITALOPRAM OXALATE 10 MG/1
10 TABLET ORAL
Qty: 90 TABLET | Refills: 0 | Status: SHIPPED | OUTPATIENT
Start: 2025-01-28

## 2025-02-11 ENCOUNTER — HOSPITAL ENCOUNTER (OUTPATIENT)
Facility: HOSPITAL | Age: 55
Discharge: HOME | End: 2025-02-11
Attending: PAIN MEDICINE | Admitting: PAIN MEDICINE
Payer: COMMERCIAL

## 2025-02-11 VITALS
HEIGHT: 66 IN | BODY MASS INDEX: 37.28 KG/M2 | DIASTOLIC BLOOD PRESSURE: 72 MMHG | WEIGHT: 232 LBS | SYSTOLIC BLOOD PRESSURE: 112 MMHG | HEART RATE: 63 BPM | OXYGEN SATURATION: 100 % | TEMPERATURE: 99 F | RESPIRATION RATE: 14 BRPM

## 2025-02-11 DIAGNOSIS — M54.17 LUMBOSACRAL RADICULOPATHY: ICD-10-CM

## 2025-02-11 PROCEDURE — 25500020 PHARM REV CODE 255: Performed by: PAIN MEDICINE

## 2025-02-11 PROCEDURE — 64483 NJX AA&/STRD TFRM EPI L/S 1: CPT | Mod: LT | Performed by: PAIN MEDICINE

## 2025-02-11 PROCEDURE — 63600175 PHARM REV CODE 636 W HCPCS: Performed by: PAIN MEDICINE

## 2025-02-11 RX ORDER — IOPAMIDOL 612 MG/ML
INJECTION, SOLUTION INTRAVASCULAR CODE/TRAUMA/SEDATION MEDICATION
Status: DISCONTINUED | OUTPATIENT
Start: 2025-02-11 | End: 2025-02-11 | Stop reason: HOSPADM

## 2025-02-11 RX ORDER — TRIAMCINOLONE ACETONIDE 40 MG/ML
INJECTION, SUSPENSION INTRA-ARTICULAR; INTRAMUSCULAR CODE/TRAUMA/SEDATION MEDICATION
Status: DISCONTINUED | OUTPATIENT
Start: 2025-02-11 | End: 2025-02-11 | Stop reason: HOSPADM

## 2025-02-11 RX ORDER — SODIUM CHLORIDE 9 MG/ML
INJECTION, SOLUTION INTRAVENOUS CONTINUOUS
Status: DISCONTINUED | OUTPATIENT
Start: 2025-02-11 | End: 2025-02-11 | Stop reason: HOSPADM

## 2025-02-11 NOTE — BRIEF OP NOTE
The  Discharge Note  Short Stay    Admit Date: 2/11/2025    Discharge Date: 2/11/2025    Attending Physician: Doreen Schulz     Discharge Provider: Doreen Schulz    Diagnosis: Lumbar radiculopathy    Procedure performed: Left L4-5 TFESI and epiduralgram under fluoroscopy    Findings: Procedure tolerated well and without complications. Consistent with diagnosis.    EBL: 0cc    Specimens: None    Discharged Condition: Good    Final Diagnoses: Lumbar radiculopathy [M54.16]    Disposition: Home or Self Care    Hospital Course: No complications, uneventful    Outcome of Hospitalization, Treatment, Procedure, or Surgery:  Patient was admitted for outpatient interventional pain management procedure. The patient tolerated the procedure well with no complications.    Follow up/Patient Instructions:  Follow up as scheduled in Pain Management office in 3-4 weeks.  Patient has received instructions and follow up date and time.    Medications:  Continue previous medications

## 2025-02-11 NOTE — DISCHARGE INSTRUCTIONS
Continue diet as tolerated. Drink plenty of fluids and rest.   If unable to void in 8 hours proceed to nearest ER.   Notify MD of redness or drainage from incision site as well as any fever over the next 3-4 days.  No lifting over 5 lbs for the next 24 hrs.   Continue medications as prescribed. May take pain medication as prescribed.   May shower tomorrow. No tub baths for 48 hours following procedure.   May remove bandaids tomorrow, if they fall off before tomorrow they do not have to be replaced.    If you experience any uncontrolled pain, nausea or vomiting after your procedure, do not hesitate to call the clinic.

## 2025-02-11 NOTE — DISCHARGE SUMMARY
[facility] - Pain Management  Discharge Note  Short Stay    Procedure(s) (LRB):  Injection,steroid,epidural,transforaminal approach (Left)      OUTCOME: Patient tolerated treatment/procedure well without complication and is now ready for discharge.    DISPOSITION: Home or Self Care    FINAL DIAGNOSIS:  Lumbar radiculopathy    FOLLOWUP: In clinic    DISCHARGE INSTRUCTIONS:  See nurse's notes     TIME SPENT ON DISCHARGE: 5 minutes

## 2025-02-11 NOTE — OP NOTE
Procedure Note    Procedure Date: 2/11/2025    Procedure Performed: Left Transforaminal Epidural @ left L4-5 with Fluoroscopic Guidance    Indications: Patient has failed conservative therapy.      Pre-op diagnosis: Lumbar Radiculopathy    Post-op diagnosis: same    Physician: Doreen Schulz MD    Anesthesia: Local    Medications injected:  kenalog 40mg, sterile preservative-free normal saline.    IVF: None    Estimated Blood Loss: Less than 1cc    Complications: None    Technique:  The patient was interviewed in the holding area and Risks/Benefits were discussed, including, but not limited to the possibility of new or different pain, bleeding or infection.   All questions were answered.  The patient understood and accepted risks.  Consent was signed.  A time out was taken to identify the patient, procedure and side of the procedure. The patient was placed in a prone position, then prepped and draped in the usual sterile fashion using ChloraPrep and sterile towels.  The left  L4-5 neural foramen were identified under fluoroscopic guidance in AP and oblique view.  Local anesthetic was given by raising a skin wheal with a 25-gauge 1.5 inch needle.  In the oblique view, a 3.5 inch 22-gauge  touhy needle was introduced into the foramen @ left L4-5 and positioned in the posterior superior quarter of the foramen.  .5cc of Isovue M-300  contrast was injected live in an AP fluoroscopic view at each level demonstrating appropriate needle position with contrast spread outlining the respective  left L4 nerve root and also medially into the epidural space without intravascular or intrathecal spread.  After negative aspiration 1cc from a  mixture of 40 mg Kenalog and  1mL sterile  preservative-free normal saline  was injected slowly and incrementally.  The needle was  removed.  The patient tolerated the procedure well.  The patient was monitored after the procedure.  Patient was given post procedure and discharge instructions to  follow at home. The patient was discharged in a stable condition and accompanied by an adult .

## 2025-02-11 NOTE — PLAN OF CARE
Plan:  D/c pt at 1020  Informed pt if does not void in 8 hours to go to ER. Notify if redness, drainage, from injection site or fever over next 3-4 days. Rest and drink plenty of fluids for the remainder of the day. No lifting over 5 lbs. For the remainder of the day. Continue regular medications as prescribed. May take pain medications as prescribed.     Pain improved 100%  Pre-procedure pain: 8  Post-procedure pain: 0

## 2025-02-13 DIAGNOSIS — I10 PRIMARY HYPERTENSION: ICD-10-CM

## 2025-02-13 RX ORDER — HYDROCHLOROTHIAZIDE 25 MG/1
TABLET ORAL
Qty: 90 TABLET | Refills: 1 | Status: SHIPPED | OUTPATIENT
Start: 2025-02-13

## 2025-02-17 NOTE — PROGRESS NOTES
Subjective:         Patient ID: Marcell Dickson is a 54 y.o. female.    Chief Complaint: Low-back Pain (Patient is follow up from procedure- L4-5 TFESI. No back pain today.)      Pain  This is a chronic problem. The current episode started more than 1 year ago. The problem occurs daily. The problem has been gradually improving. Associated symptoms include arthralgias. Pertinent negatives include no anorexia, change in bowel habit, chest pain, chills, coughing, diaphoresis, fever, sore throat, swollen glands, urinary symptoms, vertigo, visual change or vomiting.     Review of Systems   Constitutional:  Negative for activity change, appetite change, chills, diaphoresis, fever and unexpected weight change.   HENT:  Negative for drooling, ear discharge, ear pain, facial swelling, nosebleeds, sore throat, trouble swallowing, voice change and goiter.    Eyes:  Negative for photophobia, pain, discharge, redness and visual disturbance.   Respiratory:  Negative for apnea, cough, choking, chest tightness, shortness of breath, wheezing and stridor.    Cardiovascular:  Negative for chest pain, palpitations and leg swelling.   Gastrointestinal:  Negative for abdominal distention, anorexia, change in bowel habit, diarrhea, rectal pain, vomiting and fecal incontinence.   Endocrine: Negative for cold intolerance, heat intolerance, polydipsia, polyphagia and polyuria.   Genitourinary:  Negative for bladder incontinence, dysuria, flank pain, frequency and hot flashes.   Musculoskeletal:  Positive for arthralgias, back pain and leg pain.   Integumentary:  Negative for color change and pallor.   Allergic/Immunologic: Negative for immunocompromised state.   Neurological:  Negative for dizziness, vertigo, seizures, syncope, facial asymmetry, speech difficulty, light-headedness, memory loss and coordination difficulties.   Hematological:  Negative for adenopathy. Does not bruise/bleed easily.   Psychiatric/Behavioral:   Negative for agitation, behavioral problems, confusion, decreased concentration, dysphoric mood, hallucinations, self-injury and suicidal ideas. The patient is not nervous/anxious and is not hyperactive.            Past Medical History:   Diagnosis Date    Arthritis     GERD (gastroesophageal reflux disease)     History of rectal polyps 08/07/2018     Past Surgical History:   Procedure Laterality Date    breast reduction      CARPAL TUNNEL RELEASE Right 9/28/2023    Procedure: RELEASE, CARPAL TUNNEL;  Surgeon: Hong Rodriguez MD;  Location: [facility] OR;  Service: Orthopedics;  Laterality: Right;    CHOLECYSTECTOMY  10/28/2013    Dr. Paige    COLONOSCOPY  2018    DE QUERVAIN'S RELEASE Right 9/28/2023    Procedure: RELEASE, HAND, FOR DEQUERVAIN'S TENOSYNOVITIS;  Surgeon: Hong Rodriguez MD;  Location: [facility] OR;  Service: Orthopedics;  Laterality: Right;    EXCISION OF GANGLION OF WRIST Right 9/28/2023    Procedure: EXCISION, GANGLION CYST, WRIST;  Surgeon: Hong Rodriguez MD;  Location: [facility] OR;  Service: Orthopedics;  Laterality: Right;    gastric sleeve      HYSTERECTOMY  10/28/2013    Robotic, BSO,Cystoscopy, TOT-    OOPHORECTOMY      TRANSFORAMINAL EPIDURAL INJECTION OF STEROID Left 8/20/2024    Procedure: Injection,steroid,epidural,transforaminal approach, L4/5;  Surgeon: Doreen Schulz MD;  Location: [facility];  Service: Pain Management;  Laterality: Left;    TRANSFORAMINAL EPIDURAL INJECTION OF STEROID Left 2/11/2025    Procedure: Injection,steroid,epidural,transforaminal approach;  Surgeon: Doreen Schulz MD;  Location: [facility];  Service: Pain Management;  Laterality: Left;    TUBAL LIGATION      tummy franco       Social History     Socioeconomic History    Marital status:      Spouse name: Guillermo    Number of children: 4   Tobacco Use    Smoking status: Never     Passive exposure: Never    Smokeless tobacco: Never   Substance  "and Sexual Activity    Alcohol use: Yes     Comment: occasionally    Drug use: Never    Sexual activity: Yes     Partners: Male     Family History   Problem Relation Name Age of Onset    Heart failure Mother      Heart disease Father      Colon cancer Sister      Stomach cancer Brother Joel     Lung cancer Brother Joel      Review of patient's allergies indicates:   Allergen Reactions    Sulfa (sulfonamide antibiotics)         Objective:  Vitals:    02/25/25 1008 02/25/25 1009   BP: 120/64    Pulse: 66    Resp: 18    Weight: 108.4 kg (239 lb)    Height: 5' 7" (1.702 m)    PainSc: 0-No pain 0-No pain   PainLoc: Back                  Physical Exam  Vitals and nursing note reviewed. Exam conducted with a chaperone present.   Constitutional:       General: She is awake. She is not in acute distress.     Appearance: Normal appearance. She is not ill-appearing, toxic-appearing or diaphoretic.   HENT:      Head: Normocephalic and atraumatic.      Nose: Nose normal.      Mouth/Throat:      Mouth: Mucous membranes are moist.      Pharynx: Oropharynx is clear.   Eyes:      Conjunctiva/sclera: Conjunctivae normal.      Pupils: Pupils are equal, round, and reactive to light.   Cardiovascular:      Rate and Rhythm: Normal rate.   Pulmonary:      Effort: Pulmonary effort is normal. No respiratory distress.   Abdominal:      Palpations: Abdomen is soft.      Tenderness: There is no guarding.   Musculoskeletal:         General: Normal range of motion.      Cervical back: Normal range of motion and neck supple. No rigidity.   Skin:     General: Skin is warm and dry.      Coloration: Skin is not jaundiced or pale.   Neurological:      General: No focal deficit present.      Mental Status: She is alert and oriented to person, place, and time. Mental status is at baseline.      Cranial Nerves: No cranial nerve deficit (II-XII).   Psychiatric:         Mood and Affect: Mood normal.         Behavior: Behavior normal. Behavior is " cooperative.         Thought Content: Thought content normal.           FL Fluoro for Pain Management  See OP Notes for results.     IMPRESSION: See OP Notes for results.     This procedure was auto-finalized by: Virtual Radiologist       Office Visit on 10/21/2024   Component Date Value Ref Range Status    Sodium 10/21/2024 135 (L)  136 - 145 mmol/L Final    Potassium 10/21/2024 2.6 (L)  3.5 - 5.1 mmol/L Final    Chloride 10/21/2024 98  98 - 107 mmol/L Final    CO2 10/21/2024 32  21 - 32 mmol/L Final    Anion Gap 10/21/2024 8  7 - 16 mmol/L Final    Glucose 10/21/2024 84  74 - 106 mg/dL Final    BUN 10/21/2024 11  7 - 18 mg/dL Final    Creatinine 10/21/2024 1.12 (H)  0.55 - 1.02 mg/dL Final    BUN/Creatinine Ratio 10/21/2024 10  6 - 20 Final    Calcium 10/21/2024 9.7  8.5 - 10.1 mg/dL Final    Total Protein 10/21/2024 7.4  6.4 - 8.2 g/dL Final    Albumin 10/21/2024 3.7  3.5 - 5.0 g/dL Final    Globulin 10/21/2024 3.7  2.0 - 4.0 g/dL Final    A/G Ratio 10/21/2024 1.0   Final    Bilirubin, Total 10/21/2024 0.4  >0.0 - 1.2 mg/dL Final    Alk Phos 10/21/2024 89  46 - 118 U/L Final    ALT 10/21/2024 31  13 - 56 U/L Final    AST 10/21/2024 35  15 - 37 U/L Final    eGFR 10/21/2024 58 (L)  >=60 mL/min/1.73m2 Final    Triglycerides 10/21/2024 89  35 - 150 mg/dL Final    Cholesterol 10/21/2024 148  0 - 200 mg/dL Final    HDL Cholesterol 10/21/2024 60  40 - 60 mg/dL Final    Cholesterol/HDL Ratio (Risk Factor) 10/21/2024 2.5   Final    Non-HDL 10/21/2024 88  mg/dL Final    LDL Calculated 10/21/2024 70  mg/dL Final    LDL/HDL 10/21/2024 1.2   Final    VLDL 10/21/2024 18  mg/dL Final    WBC 10/21/2024 5.73  4.50 - 11.00 K/uL Final    RBC 10/21/2024 3.98 (L)  4.20 - 5.40 M/uL Final    Hemoglobin 10/21/2024 11.9 (L)  12.0 - 16.0 g/dL Final    Hematocrit 10/21/2024 37.0 (L)  38.0 - 47.0 % Final    MCV 10/21/2024 93.0  80.0 - 96.0 fL Final    MCH 10/21/2024 29.9  27.0 - 31.0 pg Final    MCHC 10/21/2024 32.2  32.0 - 36.0 g/dL Final     RDW 10/21/2024 13.3  11.5 - 14.5 % Final    Platelet Count 10/21/2024 292  150 - 400 K/uL Final    MPV 10/21/2024 12.0  9.4 - 12.4 fL Final    Neutrophils % 10/21/2024 42.5 (L)  53.0 - 65.0 % Final    Lymphocytes % 10/21/2024 44.2 (H)  27.0 - 41.0 % Final    Monocytes % 10/21/2024 10.3 (H)  2.0 - 6.0 % Final    Eosinophils % 10/21/2024 2.3  1.0 - 4.0 % Final    Basophils % 10/21/2024 0.5  0.0 - 1.0 % Final    Immature Granulocytes % 10/21/2024 0.2  0.0 - 0.4 % Final    nRBC, Auto 10/21/2024 0.0  <=0.0 % Final    Neutrophils, Abs 10/21/2024 2.44  1.80 - 7.70 K/uL Final    Lymphocytes, Absolute 10/21/2024 2.53  1.00 - 4.80 K/uL Final    Monocytes, Absolute 10/21/2024 0.59  0.00 - 0.80 K/uL Final    Eosinophils, Absolute 10/21/2024 0.13  0.00 - 0.50 K/uL Final    Basophils, Absolute 10/21/2024 0.03  0.00 - 0.20 K/uL Final    Immature Granulocytes, Absolute 10/21/2024 0.01  0.00 - 0.04 K/uL Final    nRBC, Absolute 10/21/2024 0.00  <=0.00 x10e3/uL Final    Diff Type 10/21/2024 Manual   Final    Segmented Neutrophils, Man % 10/21/2024 37 (L)  50 - 62 % Final    Lymphocytes, Man % 10/21/2024 51 (H)  27 - 41 % Final    Monocytes, Man % 10/21/2024 9 (H)  2 - 6 % Final    Eosinophils, Man % 10/21/2024 2  1 - 4 % Final    Basophils, Man % 10/21/2024 1  0 - 1 % Final    Platelet Morphology 10/21/2024 Normal  Normal Final    RBC Morphology 10/21/2024 Normal   Final   Office Visit on 10/17/2024   Component Date Value Ref Range Status    Case Report 10/17/2024    Final                    Value:Pap Cytology                                      Case: [id number]                                   Authorizing Provider:  Paolo Owens DO  Collected:           10/17/2024 04:04 PM          Ordering Location:     [facility] Received:            10/18/2024 08:12 AM                                 - Obstetrics And                                                                                    Gynecology                                                                    First Screen:          Heather Schulz CT(ASCP)                                                       Specimen:    Liquid-Based Pap Test, Screening, Vagina                                                   Interpretation 10/17/2024 Negative              Final    General Categorization 10/17/2024 Negative for intraepithelial lesion or malignancy   Final    Specimen Adequacy 10/17/2024 Satisfactory for evaluation   Final    Clinical Information 10/17/2024    Final                    Value:N/A    Disclaimer 10/17/2024    Final                    Value:Notice: An irreducible false negative rate exists with pap smears, therefore a negative result does not absolutely exclude malignancy.           No orders of the defined types were placed in this encounter.      Requested Prescriptions      No prescriptions requested or ordered in this encounter       Assessment:     1. Lumbar radiculopathy, chronic                   October 1, 2023 CT abdomen  Multiple simple cyst within the right kidney. The largest cyst measures up to 3.9 cm. Multiple simple cyst within the left kidney with the largest measuring up to 1.4 cm.     There are two 0.8 cm nodules located within the anterior left lower lobe, annual follow-up with CT of the chest without intravenous contrast recommended.     Subcentimeter nonobstructing stone right kidney.    MRI lumbar spine [facility] July 7, 2023 L5/S1 left-sided neuroforaminal stenosis greater than right, L4/5 neuroforaminal narrowing left greater than right disc bulge, L3/4 bilateral neural foraminal stenosis multiple level degenerative changes  Large right renal cysts    X-ray sacroiliac joint [facility] May 15, 2023 degenerative changes no fracture noted    X-ray lumbar spine [facility] May 15, 2023 degenerative changes multiple level      Plan:    Not using narcotics from our office    Could not tolerate gabapentin gastric  upset    Follow-up after lumbar L4/5 left side TFESI # 2, February 11, 2025  She states she had 100% relief after procedure  Procedure did help improve her lung function    She states she is doing very well after procedure she is very grateful     She would like to continue with conservative management    Continue home exercise program as directed    Follow-up as needed, patient request    Dr. Schulz February 2026    Bring original prescription medication bottles/container/box with labels to each visit

## 2025-02-25 ENCOUNTER — OFFICE VISIT (OUTPATIENT)
Dept: PAIN MEDICINE | Facility: CLINIC | Age: 55
End: 2025-02-25
Payer: COMMERCIAL

## 2025-02-25 VITALS
BODY MASS INDEX: 37.51 KG/M2 | WEIGHT: 239 LBS | RESPIRATION RATE: 18 BRPM | DIASTOLIC BLOOD PRESSURE: 64 MMHG | SYSTOLIC BLOOD PRESSURE: 120 MMHG | HEIGHT: 67 IN | HEART RATE: 66 BPM

## 2025-02-25 DIAGNOSIS — M54.16 LUMBAR RADICULOPATHY, CHRONIC: Primary | Chronic | ICD-10-CM

## 2025-02-25 PROCEDURE — 3008F BODY MASS INDEX DOCD: CPT | Mod: ,,, | Performed by: PHYSICIAN ASSISTANT

## 2025-02-25 PROCEDURE — 3078F DIAST BP <80 MM HG: CPT | Mod: ,,, | Performed by: PHYSICIAN ASSISTANT

## 2025-02-25 PROCEDURE — 99213 OFFICE O/P EST LOW 20 MIN: CPT | Mod: S$PBB,,, | Performed by: PHYSICIAN ASSISTANT

## 2025-02-25 PROCEDURE — 3074F SYST BP LT 130 MM HG: CPT | Mod: ,,, | Performed by: PHYSICIAN ASSISTANT

## 2025-02-25 PROCEDURE — 99999 PR PBB SHADOW E&M-EST. PATIENT-LVL IV: CPT | Mod: PBBFAC,,, | Performed by: PHYSICIAN ASSISTANT

## 2025-02-25 PROCEDURE — 1159F MED LIST DOCD IN RCRD: CPT | Mod: ,,, | Performed by: PHYSICIAN ASSISTANT

## 2025-02-25 PROCEDURE — 99214 OFFICE O/P EST MOD 30 MIN: CPT | Mod: PBBFAC | Performed by: PHYSICIAN ASSISTANT

## 2025-03-05 DIAGNOSIS — M19.90 ARTHRITIS: Chronic | ICD-10-CM

## 2025-03-05 DIAGNOSIS — M54.17 LUMBOSACRAL RADICULOPATHY: Chronic | ICD-10-CM

## 2025-03-05 DIAGNOSIS — M51.369 BULGING LUMBAR DISC: Chronic | ICD-10-CM

## 2025-03-05 RX ORDER — CELECOXIB 100 MG/1
CAPSULE ORAL
Qty: 30 CAPSULE | Refills: 1 | Status: SHIPPED | OUTPATIENT
Start: 2025-03-05

## 2025-04-01 RX ORDER — FLUCONAZOLE 150 MG/1
TABLET ORAL
Qty: 2 TABLET | Refills: 0 | Status: SHIPPED | OUTPATIENT
Start: 2025-04-01

## 2025-04-08 DIAGNOSIS — F41.9 ANXIETY: ICD-10-CM

## 2025-04-09 RX ORDER — HYDROXYZINE PAMOATE 50 MG/1
50 CAPSULE ORAL EVERY 8 HOURS PRN
Qty: 30 CAPSULE | Refills: 2 | Status: SHIPPED | OUTPATIENT
Start: 2025-04-09

## 2025-04-24 ENCOUNTER — OFFICE VISIT (OUTPATIENT)
Dept: FAMILY MEDICINE | Facility: CLINIC | Age: 55
End: 2025-04-24
Payer: COMMERCIAL

## 2025-04-24 VITALS
OXYGEN SATURATION: 98 % | DIASTOLIC BLOOD PRESSURE: 85 MMHG | RESPIRATION RATE: 17 BRPM | HEIGHT: 67 IN | WEIGHT: 235 LBS | BODY MASS INDEX: 36.88 KG/M2 | SYSTOLIC BLOOD PRESSURE: 125 MMHG | TEMPERATURE: 98 F | HEART RATE: 72 BPM

## 2025-04-24 DIAGNOSIS — R60.0 LOCALIZED EDEMA: ICD-10-CM

## 2025-04-24 DIAGNOSIS — K21.00 GASTROESOPHAGEAL REFLUX DISEASE WITH ESOPHAGITIS WITHOUT HEMORRHAGE: ICD-10-CM

## 2025-04-24 DIAGNOSIS — R73.03 PREDIABETES: ICD-10-CM

## 2025-04-24 DIAGNOSIS — E87.6 HYPOKALEMIA: Primary | ICD-10-CM

## 2025-04-24 DIAGNOSIS — K44.9 HIATAL HERNIA: Chronic | ICD-10-CM

## 2025-04-24 DIAGNOSIS — R05.9 COUGH, UNSPECIFIED TYPE: ICD-10-CM

## 2025-04-24 DIAGNOSIS — J30.89 SEASONAL ALLERGIC RHINITIS DUE TO OTHER ALLERGIC TRIGGER: Chronic | ICD-10-CM

## 2025-04-24 DIAGNOSIS — G47.09 OTHER INSOMNIA: Chronic | ICD-10-CM

## 2025-04-24 DIAGNOSIS — F41.9 ANXIETY: ICD-10-CM

## 2025-04-24 LAB
ALBUMIN SERPL BCP-MCNC: 3.6 G/DL (ref 3.5–5)
ALBUMIN/GLOB SERPL: 1 {RATIO}
ALP SERPL-CCNC: 96 U/L (ref 40–150)
ALT SERPL W P-5'-P-CCNC: 16 U/L
ANION GAP SERPL CALCULATED.3IONS-SCNC: 12 MMOL/L (ref 7–16)
AST SERPL W P-5'-P-CCNC: 25 U/L (ref 11–45)
BASOPHILS # BLD AUTO: 0.03 K/UL (ref 0–0.2)
BASOPHILS NFR BLD AUTO: 0.6 % (ref 0–1)
BILIRUB SERPL-MCNC: 0.5 MG/DL
BUN SERPL-MCNC: 12 MG/DL (ref 10–20)
BUN/CREAT SERPL: 11 (ref 6–20)
CALCIUM SERPL-MCNC: 9.2 MG/DL (ref 8.4–10.2)
CHLORIDE SERPL-SCNC: 107 MMOL/L (ref 98–107)
CO2 SERPL-SCNC: 22 MMOL/L (ref 22–29)
CREAT SERPL-MCNC: 1.13 MG/DL (ref 0.55–1.02)
DIFFERENTIAL METHOD BLD: ABNORMAL
EGFR (NO RACE VARIABLE) (RUSH/TITUS): 57 ML/MIN/1.73M2
EOSINOPHIL # BLD AUTO: 0.16 K/UL (ref 0–0.5)
EOSINOPHIL NFR BLD AUTO: 3.1 % (ref 1–4)
ERYTHROCYTE [DISTWIDTH] IN BLOOD BY AUTOMATED COUNT: 13.5 % (ref 11.5–14.5)
EST. AVERAGE GLUCOSE BLD GHB EST-MCNC: 94 MG/DL
GLOBULIN SER-MCNC: 3.7 G/DL (ref 2–4)
GLUCOSE SERPL-MCNC: 68 MG/DL (ref 74–100)
HBA1C MFR BLD HPLC: 4.9 %
HCT VFR BLD AUTO: 37.2 % (ref 38–47)
HGB BLD-MCNC: 11.5 G/DL (ref 12–16)
IMM GRANULOCYTES # BLD AUTO: 0.01 K/UL (ref 0–0.04)
IMM GRANULOCYTES NFR BLD: 0.2 % (ref 0–0.4)
LYMPHOCYTES # BLD AUTO: 2.15 K/UL (ref 1–4.8)
LYMPHOCYTES NFR BLD AUTO: 41.9 % (ref 27–41)
MCH RBC QN AUTO: 29.3 PG (ref 27–31)
MCHC RBC AUTO-ENTMCNC: 30.9 G/DL (ref 32–36)
MCV RBC AUTO: 94.9 FL (ref 80–96)
MONOCYTES # BLD AUTO: 0.41 K/UL (ref 0–0.8)
MONOCYTES NFR BLD AUTO: 8 % (ref 2–6)
MPC BLD CALC-MCNC: 10.9 FL (ref 9.4–12.4)
NEUTROPHILS # BLD AUTO: 2.37 K/UL (ref 1.8–7.7)
NEUTROPHILS NFR BLD AUTO: 46.2 % (ref 53–65)
NRBC # BLD AUTO: 0 X10E3/UL
NRBC, AUTO (.00): 0 %
PLATELET # BLD AUTO: 343 K/UL (ref 150–400)
POTASSIUM SERPL-SCNC: 3.9 MMOL/L (ref 3.5–5.1)
PROT SERPL-MCNC: 7.3 G/DL (ref 6.4–8.3)
RBC # BLD AUTO: 3.92 M/UL (ref 4.2–5.4)
SODIUM SERPL-SCNC: 137 MMOL/L (ref 136–145)
WBC # BLD AUTO: 5.13 K/UL (ref 4.5–11)

## 2025-04-24 PROCEDURE — 85025 COMPLETE CBC W/AUTO DIFF WBC: CPT | Mod: ,,, | Performed by: CLINICAL MEDICAL LABORATORY

## 2025-04-24 PROCEDURE — 3079F DIAST BP 80-89 MM HG: CPT | Mod: ,,, | Performed by: NURSE PRACTITIONER

## 2025-04-24 PROCEDURE — 3008F BODY MASS INDEX DOCD: CPT | Mod: ,,, | Performed by: NURSE PRACTITIONER

## 2025-04-24 PROCEDURE — 1159F MED LIST DOCD IN RCRD: CPT | Mod: ,,, | Performed by: NURSE PRACTITIONER

## 2025-04-24 PROCEDURE — 3074F SYST BP LT 130 MM HG: CPT | Mod: ,,, | Performed by: NURSE PRACTITIONER

## 2025-04-24 PROCEDURE — 1160F RVW MEDS BY RX/DR IN RCRD: CPT | Mod: ,,, | Performed by: NURSE PRACTITIONER

## 2025-04-24 PROCEDURE — 80053 COMPREHEN METABOLIC PANEL: CPT | Mod: ,,, | Performed by: CLINICAL MEDICAL LABORATORY

## 2025-04-24 PROCEDURE — 83036 HEMOGLOBIN GLYCOSYLATED A1C: CPT | Mod: ,,, | Performed by: CLINICAL MEDICAL LABORATORY

## 2025-04-24 PROCEDURE — 99214 OFFICE O/P EST MOD 30 MIN: CPT | Mod: ,,, | Performed by: NURSE PRACTITIONER

## 2025-04-24 RX ORDER — CETIRIZINE HYDROCHLORIDE 10 MG/1
10 TABLET ORAL DAILY
Qty: 90 TABLET | Refills: 1 | Status: SHIPPED | OUTPATIENT
Start: 2025-04-24

## 2025-04-24 RX ORDER — ESCITALOPRAM OXALATE 10 MG/1
10 TABLET ORAL DAILY
Qty: 90 TABLET | Refills: 1 | Status: SHIPPED | OUTPATIENT
Start: 2025-04-24

## 2025-04-24 RX ORDER — POTASSIUM CHLORIDE 20 MEQ/1
20 TABLET, EXTENDED RELEASE ORAL DAILY
Qty: 90 TABLET | Refills: 1 | Status: SHIPPED | OUTPATIENT
Start: 2025-04-24

## 2025-04-24 RX ORDER — TRAZODONE HYDROCHLORIDE 100 MG/1
100 TABLET ORAL NIGHTLY
Qty: 90 TABLET | Refills: 1 | Status: SHIPPED | OUTPATIENT
Start: 2025-04-24

## 2025-04-24 RX ORDER — ESOMEPRAZOLE MAGNESIUM 40 MG/1
40 CAPSULE, DELAYED RELEASE ORAL
Qty: 90 CAPSULE | Refills: 1 | Status: SHIPPED | OUTPATIENT
Start: 2025-04-24 | End: 2026-04-24

## 2025-04-24 RX ORDER — ALBUTEROL SULFATE 90 UG/1
2 INHALANT RESPIRATORY (INHALATION) EVERY 6 HOURS PRN
Qty: 18 G | Refills: 0 | Status: SHIPPED | OUTPATIENT
Start: 2025-04-24 | End: 2026-04-24

## 2025-04-24 RX ORDER — HYDROXYZINE PAMOATE 50 MG/1
50 CAPSULE ORAL EVERY 8 HOURS PRN
Qty: 30 CAPSULE | Refills: 2 | Status: SHIPPED | OUTPATIENT
Start: 2025-04-24

## 2025-04-24 RX ORDER — FUROSEMIDE 20 MG/1
20 TABLET ORAL DAILY
Qty: 90 TABLET | Refills: 1 | Status: SHIPPED | OUTPATIENT
Start: 2025-04-24 | End: 2026-04-24

## 2025-04-24 NOTE — PROGRESS NOTES
"Clinic Note    Marcell Dickson is a 54 y.o. female     Chief Complaint:   Chief Complaint   Patient presents with    Follow-up     6 month f/u, pt states that her left leg has been swelling.        Subjective:    Patient comes in today for 6 month follow up and medication refills. Pmh-anxiety, gerd, allergies, edema  Patient states she sees specialist in [place] for hormone management. Recently started on metformin ER 500mg 2 tabs at bedtime. States she was told prediabetic. Admits to craving sweets.   Patient states she saw Dr. Gonzalez. D/c'ed hctz. Continued lasix. Admits to edema to ble at times. Increases lasix when needed.  Patient reports scheduled for mammogram on May 5th in [place].   Otherwise denies any complaints or concerns.          Allergies:   Review of patient's allergies indicates:   Allergen Reactions    Sulfa (sulfonamide antibiotics)         Past Medical History:  Past Medical History:   Diagnosis Date    Arthritis     GERD (gastroesophageal reflux disease)     History of rectal polyps 08/07/2018        Current Medications:  Current Medications[1]       Review of Systems   Constitutional:  Negative for fever.   Respiratory:  Negative for cough and shortness of breath.    Cardiovascular:  Positive for leg swelling. Negative for chest pain and palpitations.   Gastrointestinal:  Negative for abdominal pain, constipation, diarrhea, nausea and vomiting.   Genitourinary:  Negative for dysuria.   Neurological:  Negative for headaches.          Objective:    /85 (BP Location: Left arm, Patient Position: Sitting)   Pulse 72   Temp 98.4 °F (36.9 °C) (Oral)   Resp 17   Ht 5' 7" (1.702 m)   Wt 106.6 kg (235 lb)   SpO2 98%   BMI 36.81 kg/m²      Physical Exam  Constitutional:       Appearance: Normal appearance. She is obese.   Eyes:      Extraocular Movements: Extraocular movements intact.   Cardiovascular:      Rate and Rhythm: Normal rate and regular rhythm.      Pulses: Normal " pulses.      Heart sounds: Normal heart sounds.   Pulmonary:      Effort: Pulmonary effort is normal.      Breath sounds: Normal breath sounds.   Abdominal:      Palpations: Abdomen is soft.      Tenderness: There is no abdominal tenderness. There is no guarding or rebound.   Musculoskeletal:      Comments: Trace ble   Skin:     General: Skin is warm and dry.   Neurological:      Mental Status: She is alert and oriented to person, place, and time.   Psychiatric:         Mood and Affect: Mood normal.          Assessment and Plan:    1. Hypokalemia    2. Other insomnia    3. Seasonal allergic rhinitis due to other allergic trigger    4. Localized edema    5. Hiatal hernia    6. Gastroesophageal reflux disease with esophagitis without hemorrhage    7. Anxiety    8. Prediabetes    9. Cough, unspecified type         Hypokalemia  -     potassium chloride SA (K-DUR,KLOR-CON) 20 MEQ tablet; Take 1 tablet (20 mEq total) by mouth once daily.  Dispense: 90 tablet; Refill: 1  -     Comprehensive Metabolic Panel; Future; Expected date: 04/24/2025    Other insomnia  -     traZODone (DESYREL) 100 MG tablet; Take 1 tablet (100 mg total) by mouth every evening.  Dispense: 90 tablet; Refill: 1    Seasonal allergic rhinitis due to other allergic trigger  -     cetirizine (ZYRTEC) 10 MG tablet; Take 1 tablet (10 mg total) by mouth once daily.  Dispense: 90 tablet; Refill: 1    Localized edema  -     furosemide (LASIX) 20 MG tablet; Take 1 tablet (20 mg total) by mouth once daily.  Dispense: 90 tablet; Refill: 1  -elevate ble prn  -compression socks    Hiatal hernia  -     esomeprazole (NEXIUM) 40 MG capsule; Take 1 capsule (40 mg total) by mouth before breakfast.  Dispense: 90 capsule; Refill: 1    Gastroesophageal reflux disease with esophagitis without hemorrhage  -     esomeprazole (NEXIUM) 40 MG capsule; Take 1 capsule (40 mg total) by mouth before breakfast.  Dispense: 90 capsule; Refill: 1    Anxiety  -     hydrOXYzine pamoate  (VISTARIL) 50 MG Cap; Take 1 capsule (50 mg total) by mouth every 8 (eight) hours as needed (anxiety/itching).  Dispense: 30 capsule; Refill: 2  -     CBC Auto Differential; Future; Expected date: 04/24/2025  -     Comprehensive Metabolic Panel; Future; Expected date: 04/24/2025  -     EScitalopram oxalate (LEXAPRO) 10 MG tablet; Take 1 tablet (10 mg total) by mouth once daily.  Dispense: 90 tablet; Refill: 1    Prediabetes  -     Hemoglobin A1C; Future; Expected date: 04/24/2025    Cough, unspecified type  -     albuterol (VENTOLIN HFA) 90 mcg/actuation inhaler; Inhale 2 puffs into the lungs every 6 (six) hours as needed for Wheezing. Rescue  Dispense: 18 g; Refill: 0          There are no Patient Instructions on file for this visit.   Follow up in about 6 months (around 10/24/2025).          [1]   Current Outpatient Medications:     albuterol (VENTOLIN HFA) 90 mcg/actuation inhaler, Inhale 2 puffs into the lungs every 6 (six) hours as needed for Wheezing. Rescue, Disp: 18 g, Rfl: 0    cetirizine (ZYRTEC) 10 MG tablet, Take 1 tablet (10 mg total) by mouth once daily., Disp: 90 tablet, Rfl: 1    EScitalopram oxalate (LEXAPRO) 10 MG tablet, Take 1 tablet (10 mg total) by mouth once daily., Disp: 90 tablet, Rfl: 1    esomeprazole (NEXIUM) 40 MG capsule, Take 1 capsule (40 mg total) by mouth before breakfast., Disp: 90 capsule, Rfl: 1    furosemide (LASIX) 20 MG tablet, Take 1 tablet (20 mg total) by mouth once daily., Disp: 90 tablet, Rfl: 1    hydrOXYzine pamoate (VISTARIL) 50 MG Cap, Take 1 capsule (50 mg total) by mouth every 8 (eight) hours as needed (anxiety/itching)., Disp: 30 capsule, Rfl: 2    linaCLOtide (LINZESS) 290 mcg Cap capsule, Take 1 capsule (290 mcg total) by mouth before breakfast., Disp: 90 capsule, Rfl: 1    metFORMIN (GLUCOPHAGE-XR) 500 MG ER 24hr tablet, Take 500 mg by mouth 2 (two) times daily., Disp: , Rfl:     potassium chloride SA (K-DUR,KLOR-CON) 20 MEQ tablet, Take 1 tablet (20 mEq total)  by mouth once daily., Disp: 90 tablet, Rfl: 1    progesterone (PROMETRIUM) 100 MG capsule, Take 100 mg by mouth every evening., Disp: , Rfl:     traZODone (DESYREL) 100 MG tablet, Take 1 tablet (100 mg total) by mouth every evening., Disp: 90 tablet, Rfl: 1

## 2025-04-25 ENCOUNTER — RESULTS FOLLOW-UP (OUTPATIENT)
Dept: FAMILY MEDICINE | Facility: CLINIC | Age: 55
End: 2025-04-25

## 2025-05-09 ENCOUNTER — PATIENT OUTREACH (OUTPATIENT)
Facility: HOSPITAL | Age: 55
End: 2025-05-09
Payer: COMMERCIAL

## 2025-05-09 NOTE — LETTER
AUTHORIZATION FOR RELEASE OF   CONFIDENTIAL INFORMATION    Dear [facility],    We are seeing Marcell Dickson, date of birth 1970, in the clinic at [facility]. Arlet Rodriguez MD is the patient's PCP. Marcell Dickson has an outstanding lab/procedure at the time we reviewed her chart. In order to help keep her health information updated, she has authorized us to request the following medical record(s):        ( x )  MAMMOGRAM   25                                   (  )  COLONOSCOPY      (  )  PAP SMEAR                                          (  )  OUTSIDE LAB RESULTS     (  )  DEXA SCAN                                          (  )  EYE EXAM            (  )  FOOT EXAM                                          (  )  ENTIRE RECORD     (  )  OUTSIDE IMMUNIZATIONS                 (  )  _______________         Please fax records to [facility], Mallary Harvey, LPN Care Coordinator, [phone].      If you have any questions, please contact Janette at [phone]. .           Patient Name: Marcell Dickson  : 1970  Patient Phone #: [phone]                aMrcell Dickson  MRN: [id number]  : 1970  Age: 54 y.o.  Sex: female         Patient/Legal Guardian Signature  This signature was collected at 10/23/2024    Marcell Dickson     Self  _______________________________   Printed Name/Relationship to Patient      Consent for Examination and Treatment: I hereby authorize the providers and employees of [facility] ([facility]) to provide medical treatment/services which includes, but is not limited to, performing and administering tests and diagnostic procedures that are deemed necessary, including, but not limited to, imaging examinations, blood tests and other laboratory procedures as may be required by the hospital, clinic, or may be ordered by my physician(s) or persons working under the general  and/or special instructions of my physician(s).      I understand and agree that this consent covers all authorized persons, including but not limited to physicians, residents, nurse practitioners, physicians' assistants, specialists, consultants, student nurses, and independently contracted physicians, who are called upon by the physician in charge, to carry out the diagnostic procedures and medical or surgical treatment.     I hereby authorize [facility] to retain or dispose of any specimens or tissue, should there be such remaining from any test or procedure.     I hereby authorize and give consent for [facility] providers and employees to take photographs, images or videotapes of such diagnostic, surgical or treatment procedures of Patient as may be required by [facility] or as may be ordered by a physician. I further acknowledge and agree that [facility] may use cameras or other devices for patient monitoring.     I am aware that the practice of medicine is not an exact science, and I acknowledge that no guarantees have been made to me as to the outcome of any tests, procedures or treatment.     Authorization for Release of Information: I understand that my insurance company and/or their agents may need information necessary to make determinations about payment/reimbursement. I hereby provide authorization to release to all insurance companies, their successors, assignees, other parties with whom they may have contracted, or others acting on their behalf, that are involved with payment for any hospital and/or clinic charges incurred by the patient, any information that they request and deem necessary for payment/reimbursement, and/or quality review.  I further authorize the release of my health information to physicians or other health care practitioners on staff who are involved in my health care now and in the future, and to other health care providers, entities, or institutions for the purpose of my continued care and  treatment, including referrals.     REGISTRATION AUTHORIZATION  Form No. 10369 (Rev. 3/25/2024)    Page 1 of 3                       Medicare Patient's Certification and Authorization to Release Information and Payment Request:  I certify that the information given by me in applying for payment under Title XVIII of the Social Security Act is correct. I authorize any montes [place] oca of medical or other information about me to release to the Social SecurityAdministration, or its intermediaries or carriers, any information needed for this or a related Medicare claim. I request that payment of authorized benefits be made on my behalf.     Assignment of Insurance Benefits:   I hereby authorize any and all insurance companies, health plans, defined   benefit plans, health insurers or any entity that is or may be responsible for payment of my medical expenses to pay all hospital and medical benefits now due, and to become due and payable to me under any hospital benefits, sick benefits, injury benefits or any other benefit for services rendered to me, including Major Medical Benefits, direct to [facility] and all independently contracted physicians. I assign any and all rights that I may have against any and all insurance companies, health plans, defined benefit plans, health insurers or any entity that is or may be responsible for payment of my medical expenses, including, but not limited to any right to appeal a denial of a claim, any right to bring any action, lawsuit, administrative proceeding, or other cause of action on my behalf. I specifically assign my right to pursue litigation against any and all insurance companies, health plans, defined benefit plans, health insurers or any entity that is or may be responsible for payment of my medical expenses based upon a refusal to pay charges.            E. Valuables: It is understood and agreed that [facility] is not liable for the damage to or loss of any money, jewelry,   documents,  dentures, eye glasses, hearing aids, prosthetics, or other property of value.     F. Computer Equipment: I understand and agree that should I choose to use computer equipment owned by [facility] or if I choose to access the Internet via [facility]s network, I do so at my own risk. [facility] is not responsible for any damage to my computer equipment or to any damages of any type that might arise from my loss of equipment or data.     G. Acceptance of Financial Responsibility:  I agree that in consideration of the services and   supplies that have been   or will be furnished to the patient, I am hereby obligated to pay all charges made for or on the account of the patient according to the standard rates (in effect at the time the services and supplies are delivered) established by [facility], including its Patient Financial Assistance Policy to the extent it is applicable. I understand that I am responsible for all charges, or portions thereof, not covered by insurance or other sources. Patient refunds will be distributed only after balances at all [facility] facilities are paid.     H. Communication Authorization:  I hereby authorize [facility] and its representatives, along with any billing service   or  who may work on their behalf, to contact me on   my cell phone and/or home phone using pre- recorded messages, artificial voice messages, automatic telephone dialing devices or other computer assisted technology, or by electronic      mail, text messaging, or by any other form of electronic communication. This includes, but is not limited to, appointment reminders, yearly physical exam reminders, preventive care reminders, patient campaigns, welcome calls, and calls about account balances on my account or any account on which I am listed as a guarantor. I understand I have the right to opt out of these communications at any time.      Relationship  Between  Facility and  Provider:      I understand that some, but  not all, providers furnishing services to the patient are not employees or agents of [facility]. The patient is under the care and supervision of his/her attending physician, and it is the responsibility of the facility and its nursing staff to carry out the instructions of such physicians. It is the responsibility of the patient's physician/designee to obtain the patient's informed consent, when required, for medical or surgical treatment, special diagnostic or therapeutic procedures, or hospital services rendered for the patient under the special instructions of the physician/designee.           REGISTRATION AUTHORIZATION  Form No. 71668 (Rev. 3/25/2024)    Page 2 of 3                       Immunizations: [facility] shares immunization information with state sponsored health departments to help you and your doctor keep track of your immunization records. By signing, you consent to have this information shared with the health department in your state:                                [place] - LINKS ([place] Immunization Network for Kids Statewide)                                [place] - MIIX ([place] Immunization Information eXchange)                                [place] - ImmPRINT (Immunization Patient Registry with Integrated Technology)     TERM: This authorization is valid for this and subsequent care/treatment I receive at [facility] and will remain valid unless/until revoked in writing by me.     [facility]: As used in this document, [facility] means all [facility] owned and managed facilities, including, but not limited to, all health centers, surgery centers, clinics, urgent care centers, and hospitals.         [facility] complies with applicable Federal civil rights laws and does not discriminate on the basis of race, color, national origin, age, disability, or sex.  ATENCIÓN: si habla derrickañol, tiene a andrews disposición servicios gratuitos de asistencia lingüística. Llame al  [phone].  GABY Ý: N?u b?n nói Ti?ng Vi?t, có các d?ch v? h? tr? ngôn ng? mi?n phí dành cho b?n. G?i s? [phone].        REGISTRATION AUTHORIZATION  Form No. 44043 (Rev. 3/25/2024)   Page 3 of 3     Patient

## 2025-05-09 NOTE — PROGRESS NOTES
Population Health Chart Review & Patient Outreach Details    Updates Requested / Reviewed:  [x]  Care Team Updated      Health Maintenance Topics Addressed and Outreach Outcomes / Actions Taken:  Breast Cancer Screening [x] SAMIR faxed to [facility] for mammogram on 5/5/25.

## 2025-05-15 ENCOUNTER — OFFICE VISIT (OUTPATIENT)
Dept: FAMILY MEDICINE | Facility: CLINIC | Age: 55
End: 2025-05-15
Payer: COMMERCIAL

## 2025-05-15 VITALS
WEIGHT: 235 LBS | RESPIRATION RATE: 17 BRPM | HEIGHT: 67 IN | OXYGEN SATURATION: 97 % | TEMPERATURE: 98 F | DIASTOLIC BLOOD PRESSURE: 74 MMHG | SYSTOLIC BLOOD PRESSURE: 110 MMHG | HEART RATE: 83 BPM | BODY MASS INDEX: 36.88 KG/M2

## 2025-05-15 DIAGNOSIS — R05.9 COUGH, UNSPECIFIED TYPE: ICD-10-CM

## 2025-05-15 DIAGNOSIS — R06.09 DYSPNEA ON EXERTION: Primary | ICD-10-CM

## 2025-05-15 DIAGNOSIS — R91.8 PULMONARY NODULES: ICD-10-CM

## 2025-05-15 DIAGNOSIS — R93.89 ABNORMAL FINDING ON IMAGING: ICD-10-CM

## 2025-05-15 PROCEDURE — 3008F BODY MASS INDEX DOCD: CPT | Mod: ,,, | Performed by: NURSE PRACTITIONER

## 2025-05-15 PROCEDURE — 99213 OFFICE O/P EST LOW 20 MIN: CPT | Mod: ,,, | Performed by: NURSE PRACTITIONER

## 2025-05-15 PROCEDURE — 1159F MED LIST DOCD IN RCRD: CPT | Mod: ,,, | Performed by: NURSE PRACTITIONER

## 2025-05-15 PROCEDURE — 1160F RVW MEDS BY RX/DR IN RCRD: CPT | Mod: ,,, | Performed by: NURSE PRACTITIONER

## 2025-05-15 PROCEDURE — 3074F SYST BP LT 130 MM HG: CPT | Mod: ,,, | Performed by: NURSE PRACTITIONER

## 2025-05-15 PROCEDURE — 3044F HG A1C LEVEL LT 7.0%: CPT | Mod: ,,, | Performed by: NURSE PRACTITIONER

## 2025-05-15 PROCEDURE — 3078F DIAST BP <80 MM HG: CPT | Mod: ,,, | Performed by: NURSE PRACTITIONER

## 2025-05-15 NOTE — LETTER
May 15, 2025      [facility]  [address]  [place] [address]  Phone: [phone]  Fax: [phone]       Patient: Marcell Dickson   YOB: 1970  Date of Visit: 05/15/2025    To Whom It May Concern:    Vern Dickson  was at [facility] on 05/15/2025. The patient may return to work/school on 05/15/2025 with no restrictions. If you have any questions or concerns, or if I can be of further assistance, please do not hesitate to contact me.    Sincerely,    BEE Prather

## 2025-05-15 NOTE — PROGRESS NOTES
"Clinic Note    Marcell Dickson is a 54 y.o. female     Chief Complaint:   Chief Complaint   Patient presents with    Shortness of Breath     Patient states that she's sob when she moves around , daily activities. Pt states that she has some fluid on her legs.    Cough     Pt states that she has some spot on her lungs but she hasn't been given appointment to see a lung doctor.        Subjective:    Patient complains of dyspnea on exertion. States feels out of breath with routine activity or after hot shower. Denies wheezing. Admits to dry cough. Has used albuterol inhaler with no relief. Denies hx of chf. States she does have hx of pulmonary nodules.   Patient admits to fatigue. States low energy. Had recent basic labs 3 weeks ago.  Admits to mild swelling to ble. Takes lasix prn. Hctz was d/c'ed per nephrology.         Allergies:   Review of patient's allergies indicates:   Allergen Reactions    Sulfa (sulfonamide antibiotics)         Past Medical History:  Past Medical History:   Diagnosis Date    Arthritis     GERD (gastroesophageal reflux disease)     History of rectal polyps 08/07/2018        Current Medications:  Current Medications[1]       Review of Systems   Constitutional:  Negative for fever.   Respiratory:  Positive for cough and shortness of breath. Negative for wheezing.    Cardiovascular:  Negative for chest pain, palpitations and leg swelling.   Gastrointestinal:  Negative for abdominal pain, constipation, diarrhea, nausea and vomiting.   Genitourinary:  Negative for dysuria.   Neurological:  Negative for headaches.          Objective:    /74 (BP Location: Left arm, Patient Position: Sitting)   Pulse 83   Temp 98 °F (36.7 °C) (Oral)   Resp 17   Ht 5' 7" (1.702 m)   Wt 106.6 kg (235 lb)   SpO2 97%   BMI 36.81 kg/m²      Physical Exam  Constitutional:       Appearance: Normal appearance.   Eyes:      Extraocular Movements: Extraocular movements intact.   Cardiovascular:      Rate " and Rhythm: Normal rate and regular rhythm.      Pulses: Normal pulses.      Heart sounds: Normal heart sounds.   Pulmonary:      Effort: Pulmonary effort is normal.      Breath sounds: Normal breath sounds.   Musculoskeletal:      Right lower leg: No edema.      Left lower leg: No edema.   Neurological:      Mental Status: She is alert and oriented to person, place, and time.          Assessment and Plan:    1. Dyspnea on exertion    2. Cough, unspecified type    3. Abnormal finding on imaging    4. Pulmonary nodules         Dyspnea on exertion  -     Ambulatory referral/consult to Pulmonology; Future; Expected date: 05/22/2025    Cough, unspecified type  -     CT Chest Without Contrast; Future; Expected date: 05/15/2025  -     Ambulatory referral/consult to Pulmonology; Future; Expected date: 05/22/2025    Abnormal finding on imaging  -     CT Chest Without Contrast; Future; Expected date: 05/15/2025    Pulmonary nodules  -     CT Chest Without Contrast; Future; Expected date: 05/15/2025  -     Ambulatory referral/consult to Pulmonology; Future; Expected date: 05/22/2025    -patient last CT chest was . Recommended repeat. Lung nodules were larger.  -refer to pulmonology.  -discussed causes of fatigue. Basic labs stable. F/u if persist and will add vitamin levels and tsh.   -discussed sleep study. Hx of cpap but after significant weight loss was told no longer needed. Denies snoring.       There are no Patient Instructions on file for this visit.   Follow up if symptoms worsen or fail to improve.          [1]   Current Outpatient Medications:     albuterol (VENTOLIN HFA) 90 mcg/actuation inhaler, Inhale 2 puffs into the lungs every 6 (six) hours as needed for Wheezing. Rescue, Disp: 18 g, Rfl: 0    cetirizine (ZYRTEC) 10 MG tablet, Take 1 tablet (10 mg total) by mouth once daily., Disp: 90 tablet, Rfl: 1    EScitalopram oxalate (LEXAPRO) 10 MG tablet, Take 1 tablet (10 mg total) by mouth once daily., Disp:  90 tablet, Rfl: 1    esomeprazole (NEXIUM) 40 MG capsule, Take 1 capsule (40 mg total) by mouth before breakfast., Disp: 90 capsule, Rfl: 1    furosemide (LASIX) 20 MG tablet, Take 1 tablet (20 mg total) by mouth once daily., Disp: 90 tablet, Rfl: 1    hydrOXYzine pamoate (VISTARIL) 50 MG Cap, Take 1 capsule (50 mg total) by mouth every 8 (eight) hours as needed (anxiety/itching)., Disp: 30 capsule, Rfl: 2    linaCLOtide (LINZESS) 290 mcg Cap capsule, Take 1 capsule (290 mcg total) by mouth before breakfast., Disp: 90 capsule, Rfl: 1    metFORMIN (GLUCOPHAGE-XR) 500 MG ER 24hr tablet, Take 500 mg by mouth 2 (two) times daily., Disp: , Rfl:     potassium chloride SA (K-DUR,KLOR-CON) 20 MEQ tablet, Take 1 tablet (20 mEq total) by mouth once daily., Disp: 90 tablet, Rfl: 1    progesterone (PROMETRIUM) 100 MG capsule, Take 100 mg by mouth every evening., Disp: , Rfl:     traZODone (DESYREL) 100 MG tablet, Take 1 tablet (100 mg total) by mouth every evening., Disp: 90 tablet, Rfl: 1

## 2025-05-16 DIAGNOSIS — R05.9 COUGH, UNSPECIFIED TYPE: ICD-10-CM

## 2025-05-19 RX ORDER — ALBUTEROL SULFATE 90 UG/1
2 INHALANT RESPIRATORY (INHALATION) EVERY 6 HOURS PRN
Qty: 18 G | Refills: 0 | Status: SHIPPED | OUTPATIENT
Start: 2025-05-19

## 2025-05-28 ENCOUNTER — HOSPITAL ENCOUNTER (OUTPATIENT)
Dept: RADIOLOGY | Facility: HOSPITAL | Age: 55
Discharge: HOME | End: 2025-05-28
Attending: NURSE PRACTITIONER
Payer: COMMERCIAL

## 2025-05-28 DIAGNOSIS — R05.9 COUGH, UNSPECIFIED TYPE: ICD-10-CM

## 2025-05-28 DIAGNOSIS — R91.8 PULMONARY NODULES: ICD-10-CM

## 2025-05-28 DIAGNOSIS — R93.89 ABNORMAL FINDING ON IMAGING: ICD-10-CM

## 2025-05-28 PROCEDURE — 71250 CT THORAX DX C-: CPT | Mod: 26,,, | Performed by: RADIOLOGY

## 2025-05-28 PROCEDURE — 71250 CT THORAX DX C-: CPT | Mod: TC

## 2025-05-30 ENCOUNTER — RESULTS FOLLOW-UP (OUTPATIENT)
Dept: FAMILY MEDICINE | Facility: CLINIC | Age: 55
End: 2025-05-30

## 2025-06-02 ENCOUNTER — OFFICE VISIT (OUTPATIENT)
Dept: PULMONOLOGY | Facility: CLINIC | Age: 55
End: 2025-06-02
Payer: COMMERCIAL

## 2025-06-02 ENCOUNTER — PATIENT MESSAGE (OUTPATIENT)
Dept: FAMILY MEDICINE | Facility: CLINIC | Age: 55
End: 2025-06-02
Payer: COMMERCIAL

## 2025-06-02 VITALS
RESPIRATION RATE: 18 BRPM | OXYGEN SATURATION: 100 % | HEART RATE: 84 BPM | HEIGHT: 67 IN | BODY MASS INDEX: 36.88 KG/M2 | DIASTOLIC BLOOD PRESSURE: 68 MMHG | SYSTOLIC BLOOD PRESSURE: 120 MMHG | WEIGHT: 235 LBS

## 2025-06-02 DIAGNOSIS — R91.8 PULMONARY NODULES: ICD-10-CM

## 2025-06-02 DIAGNOSIS — R06.09 DYSPNEA ON EXERTION: ICD-10-CM

## 2025-06-02 DIAGNOSIS — R05.9 COUGH, UNSPECIFIED TYPE: ICD-10-CM

## 2025-06-02 PROCEDURE — 99215 OFFICE O/P EST HI 40 MIN: CPT | Mod: PBBFAC | Performed by: INTERNAL MEDICINE

## 2025-06-02 PROCEDURE — 3044F HG A1C LEVEL LT 7.0%: CPT | Mod: ,,, | Performed by: INTERNAL MEDICINE

## 2025-06-02 PROCEDURE — 1159F MED LIST DOCD IN RCRD: CPT | Mod: ,,, | Performed by: INTERNAL MEDICINE

## 2025-06-02 PROCEDURE — 4010F ACE/ARB THERAPY RXD/TAKEN: CPT | Mod: ,,, | Performed by: INTERNAL MEDICINE

## 2025-06-02 PROCEDURE — 3078F DIAST BP <80 MM HG: CPT | Mod: ,,, | Performed by: INTERNAL MEDICINE

## 2025-06-02 PROCEDURE — 3074F SYST BP LT 130 MM HG: CPT | Mod: ,,, | Performed by: INTERNAL MEDICINE

## 2025-06-02 PROCEDURE — 99999 PR PBB SHADOW E&M-EST. PATIENT-LVL V: CPT | Mod: PBBFAC,,, | Performed by: INTERNAL MEDICINE

## 2025-06-02 PROCEDURE — 99204 OFFICE O/P NEW MOD 45 MIN: CPT | Mod: S$PBB,,, | Performed by: INTERNAL MEDICINE

## 2025-06-02 PROCEDURE — 3008F BODY MASS INDEX DOCD: CPT | Mod: ,,, | Performed by: INTERNAL MEDICINE

## 2025-06-02 RX ORDER — PHENTERMINE HYDROCHLORIDE 37.5 MG/1
37.5 TABLET ORAL
COMMUNITY
Start: 2025-04-24

## 2025-06-02 RX ORDER — CELECOXIB 100 MG/1
100 CAPSULE ORAL
COMMUNITY

## 2025-06-02 RX ORDER — FLUTICASONE PROPIONATE AND SALMETEROL 250; 50 UG/1; UG/1
1 POWDER RESPIRATORY (INHALATION) 2 TIMES DAILY
Qty: 180 EACH | Refills: 0 | Status: SHIPPED | OUTPATIENT
Start: 2025-06-02 | End: 2025-08-31

## 2025-06-02 RX ORDER — HYDROCHLOROTHIAZIDE 25 MG/1
25 TABLET ORAL
COMMUNITY

## 2025-06-02 RX ORDER — PREGABALIN 100 MG/1
100 CAPSULE ORAL
COMMUNITY
Start: 2024-08-15

## 2025-06-02 RX ORDER — TRIAMCINOLONE ACETONIDE 1 MG/G
PASTE DENTAL
COMMUNITY
Start: 2025-04-07

## 2025-06-02 RX ORDER — CHLORHEXIDINE GLUCONATE ORAL RINSE 1.2 MG/ML
SOLUTION DENTAL
COMMUNITY
Start: 2025-04-07

## 2025-06-02 RX ORDER — TOPIRAMATE 50 MG/1
50 TABLET, FILM COATED ORAL NIGHTLY
COMMUNITY
Start: 2025-05-22

## 2025-06-02 RX ORDER — FLUCONAZOLE 10 MG/ML
POWDER, FOR SUSPENSION ORAL
COMMUNITY
End: 2025-06-02

## 2025-06-02 RX ORDER — LOSARTAN POTASSIUM 25 MG/1
TABLET ORAL
COMMUNITY

## 2025-06-02 RX ORDER — ERGOCALCIFEROL 1.25 MG/1
1 CAPSULE ORAL
COMMUNITY

## 2025-06-02 RX ORDER — SEMAGLUTIDE 2.4 MG/.75ML
INJECTION, SOLUTION SUBCUTANEOUS
COMMUNITY
Start: 2024-12-21

## 2025-06-03 ENCOUNTER — TELEPHONE (OUTPATIENT)
Dept: PULMONOLOGY | Facility: CLINIC | Age: 55
End: 2025-06-03
Payer: COMMERCIAL

## 2025-06-03 DIAGNOSIS — R06.09 DYSPNEA ON EXERTION: Primary | ICD-10-CM

## 2025-06-05 ENCOUNTER — OFFICE VISIT (OUTPATIENT)
Dept: CARDIOLOGY | Facility: CLINIC | Age: 55
End: 2025-06-05
Payer: COMMERCIAL

## 2025-06-05 ENCOUNTER — HOSPITAL ENCOUNTER (OUTPATIENT)
Dept: CARDIOLOGY | Facility: HOSPITAL | Age: 55
Discharge: HOME | End: 2025-06-05
Attending: STUDENT IN AN ORGANIZED HEALTH CARE EDUCATION/TRAINING PROGRAM
Payer: COMMERCIAL

## 2025-06-05 VITALS
HEART RATE: 74 BPM | HEIGHT: 67 IN | OXYGEN SATURATION: 99 % | DIASTOLIC BLOOD PRESSURE: 80 MMHG | BODY MASS INDEX: 37.48 KG/M2 | SYSTOLIC BLOOD PRESSURE: 110 MMHG | WEIGHT: 238.81 LBS

## 2025-06-05 DIAGNOSIS — R55 SYNCOPE AND COLLAPSE: ICD-10-CM

## 2025-06-05 DIAGNOSIS — R07.9 CHEST PAIN, UNSPECIFIED TYPE: ICD-10-CM

## 2025-06-05 DIAGNOSIS — R07.89 CHEST HEAVINESS: Primary | ICD-10-CM

## 2025-06-05 DIAGNOSIS — R06.09 DYSPNEA ON EXERTION: ICD-10-CM

## 2025-06-05 PROCEDURE — 99215 OFFICE O/P EST HI 40 MIN: CPT | Mod: PBBFAC,25 | Performed by: STUDENT IN AN ORGANIZED HEALTH CARE EDUCATION/TRAINING PROGRAM

## 2025-06-05 PROCEDURE — 99204 OFFICE O/P NEW MOD 45 MIN: CPT | Mod: S$PBB,,, | Performed by: STUDENT IN AN ORGANIZED HEALTH CARE EDUCATION/TRAINING PROGRAM

## 2025-06-05 PROCEDURE — 3079F DIAST BP 80-89 MM HG: CPT | Mod: ,,, | Performed by: STUDENT IN AN ORGANIZED HEALTH CARE EDUCATION/TRAINING PROGRAM

## 2025-06-05 PROCEDURE — 1159F MED LIST DOCD IN RCRD: CPT | Mod: ,,, | Performed by: STUDENT IN AN ORGANIZED HEALTH CARE EDUCATION/TRAINING PROGRAM

## 2025-06-05 PROCEDURE — 3008F BODY MASS INDEX DOCD: CPT | Mod: ,,, | Performed by: STUDENT IN AN ORGANIZED HEALTH CARE EDUCATION/TRAINING PROGRAM

## 2025-06-05 PROCEDURE — 4010F ACE/ARB THERAPY RXD/TAKEN: CPT | Mod: ,,, | Performed by: STUDENT IN AN ORGANIZED HEALTH CARE EDUCATION/TRAINING PROGRAM

## 2025-06-05 PROCEDURE — 93246 EXT ECG>7D<15D RECORDING: CPT

## 2025-06-05 PROCEDURE — 3074F SYST BP LT 130 MM HG: CPT | Mod: ,,, | Performed by: STUDENT IN AN ORGANIZED HEALTH CARE EDUCATION/TRAINING PROGRAM

## 2025-06-05 PROCEDURE — 99999 PR PBB SHADOW E&M-EST. PATIENT-LVL V: CPT | Mod: PBBFAC,,, | Performed by: STUDENT IN AN ORGANIZED HEALTH CARE EDUCATION/TRAINING PROGRAM

## 2025-06-05 PROCEDURE — 3044F HG A1C LEVEL LT 7.0%: CPT | Mod: ,,, | Performed by: STUDENT IN AN ORGANIZED HEALTH CARE EDUCATION/TRAINING PROGRAM

## 2025-06-05 NOTE — PROGRESS NOTES
PCP: Arlet Rodriguez MD    Referring Provider: Arlet Rodriguez MD  [address]  [facility] [place] [address]    Reason for referral: Dyspnea on exertion    Subjective:   Marcell Dickson is a 54 y.o. female with no known hx of cardiac disease who presents for a new patient visit.    History of Present Illness    CHIEF COMPLAINT:  Patient presents today for chest pain and dyspnea.    HISTORY OF PRESENT ILLNESS:  She has had chest pain and dyspnea since late April, 6 weeks ago. She reports chest tightness multiple times daily with exertion, associated with cough, particularly at night. In April, she experienced a syncopal episode where she woke up choking and gasping for breath. Upon standing, she had LOC and fell to the floor. After regaining consciousness, she vomited and experienced neck stiffness.    CARDIOVASCULAR:  She reports chronic LLE swelling that is worse in the morning and improves throughout the day.    MEDICAL HISTORY:  She has a history of lung and kidney nodules, and hiatal hernia.    MEDICATIONS:  She currently takes Lasix. She was previously on HCTZ which was discontinued due to kidney concerns.      Fhx: Mother had congestive heart failure and passed away between ages 85-87.  Shx:  Never smoker    EKG 06/05/2025: Normal sinus rhythm  ECHO No results found for this or any previous visit.     CATH: No results found for this or any previous visit.     Lab Results   Component Value Date     04/24/2025    K 3.9 04/24/2025     04/24/2025    CO2 22 04/24/2025    BUN 12 04/24/2025    CREATININE 1.13 (H) 04/24/2025    CALCIUM 9.2 04/24/2025    ANIONGAP 12 04/24/2025    EGFRNONAA 62 06/28/2022       Lab Results   Component Value Date    CHOL 148 10/21/2024    CHOL 147 05/02/2024    CHOL 167 01/09/2024     Lab Results   Component Value Date    HDL 60 10/21/2024    HDL 57 05/02/2024    HDL 63 (H) 01/09/2024     Lab Results   Component Value Date  "   LDLCALC 70 10/21/2024    LDLCALC 75 05/02/2024    LDLCALC 60 01/09/2024     Lab Results   Component Value Date    TRIG 89 10/21/2024    TRIG 75 05/02/2024    TRIG 220 (H) 01/09/2024     Lab Results   Component Value Date    CHOLHDL 2.5 10/21/2024    CHOLHDL 2.6 05/02/2024    CHOLHDL 2.7 01/09/2024       Lab Results   Component Value Date    WBC 5.13 04/24/2025    HGB 11.5 (L) 04/24/2025    HCT 37.2 (L) 04/24/2025    MCV 94.9 04/24/2025     04/24/2025         Current Medications[1]    Review of Systems   Constitutional:  Negative for chills, diaphoresis, fever and malaise/fatigue.   Respiratory:  Positive for cough and shortness of breath.    Cardiovascular:  Positive for chest pain. Negative for palpitations, orthopnea, claudication, leg swelling and PND.   Gastrointestinal:  Negative for abdominal pain, heartburn, nausea and vomiting.   Neurological:  Positive for loss of consciousness. Negative for dizziness.       Objective:   /80 (BP Location: Left arm, Patient Position: Sitting)   Pulse 74   Ht 5' 7" (1.702 m)   Wt 108.3 kg (238 lb 12.8 oz)   SpO2 99%   BMI 37.40 kg/m²     Physical Exam  Constitutional:       General: She is not in acute distress.     Appearance: Normal appearance.   Cardiovascular:      Rate and Rhythm: Normal rate and regular rhythm.      Pulses: Normal pulses.      Heart sounds: Normal heart sounds. No murmur heard.     No friction rub. No gallop.   Pulmonary:      Effort: Pulmonary effort is normal.      Breath sounds: Normal breath sounds. No wheezing or rales.   Musculoskeletal:      Right lower leg: No edema.      Left lower leg: No edema.   Skin:     General: Skin is warm and dry.   Neurological:      Mental Status: She is alert.           Assessment:     1. Chest heaviness  EKG 12-lead    EKG 12-lead      2. Dyspnea on exertion  Ambulatory referral/consult to Cardiology    Echo      3. Syncope and collapse  Cardiac Monitor - 3-15 Day Adult    Echo      4. Chest " pain, unspecified type  Exercise Stress - EKG            Plan:   No problem-specific Assessment & Plan notes found for this encounter.    Chest pain  Typical, associated with exertion  Risk factors:  No known CAD risk factors  Schedule exercise treadmill test, no imaging    Syncope  Proceed with 14-day ambulatory cardiac monitor  Schedule echo  Considered post-tussive syncope as cause of previous fainting episode.    Dyspnea on exertion (NYHA class II)  Cough  Appears euvolemic on exam  Schedule echocardiogram    Follow-up in  6 months unless above testing is abnormal     This note was generated with the assistance of ambient listening technology. Verbal consent was obtained by the patient and accompanying visitor(s) for the recording of patient appointment to facilitate this note. I attest to having reviewed and edited the generated note for accuracy, though some syntax or spelling errors may persist. Please contact the author of this note for any clarification.         [1]   Current Outpatient Medications:     cetirizine (ZYRTEC) 10 MG tablet, Take 1 tablet (10 mg total) by mouth once daily., Disp: 90 tablet, Rfl: 1    chlorhexidine (PERIDEX) 0.12 % solution, , Disp: , Rfl:     ergocalciferol (DRISDOL) 50,000 unit Cap, 1 capsule., Disp: , Rfl:     EScitalopram oxalate (LEXAPRO) 10 MG tablet, Take 1 tablet (10 mg total) by mouth once daily., Disp: 90 tablet, Rfl: 1    esomeprazole (NEXIUM) 40 MG capsule, Take 1 capsule (40 mg total) by mouth before breakfast., Disp: 90 capsule, Rfl: 1    fluticasone-salmeterol diskus inhaler 250-50 mcg, Inhale 1 puff into the lungs 2 (two) times daily. Controller, Disp: 180 each, Rfl: 0    furosemide (LASIX) 20 MG tablet, Take 1 tablet (20 mg total) by mouth once daily., Disp: 90 tablet, Rfl: 1    hydrOXYzine pamoate (VISTARIL) 50 MG Cap, Take 1 capsule (50 mg total) by mouth every 8 (eight) hours as needed (anxiety/itching)., Disp: 30 capsule, Rfl: 2    linaCLOtide (LINZESS) 290  mcg Cap capsule, Take 1 capsule (290 mcg total) by mouth before breakfast., Disp: 90 capsule, Rfl: 1    progesterone (PROMETRIUM) 100 MG capsule, Take 100 mg by mouth every evening., Disp: , Rfl:     topiramate (TOPAMAX) 50 MG tablet, Take 50 mg by mouth every evening., Disp: , Rfl:     traZODone (DESYREL) 100 MG tablet, Take 1 tablet (100 mg total) by mouth every evening., Disp: 90 tablet, Rfl: 1    triamcinolone acetonide 0.1% (KENALOG) 0.1 % paste, SMARTSIG:sparingly To Teeth 3 Times Daily, Disp: , Rfl:     albuterol (PROVENTIL/VENTOLIN HFA) 90 mcg/actuation inhaler, INHALE 2 PUFFS INTO THE LUNGS EVERY 6 HOURS AS NEEDED FOR WHEEZING (Patient not taking: Reported on 2025), Disp: 18 g, Rfl: 0    celecoxib (CELEBREX) 100 MG capsule, Take 100 mg by mouth. (Patient not taking: Reported on 2025), Disp: , Rfl:     hydroCHLOROthiazide (HYDRODIURIL) 25 MG tablet, Take 25 mg by mouth. (Patient not taking: Reported on 2025), Disp: , Rfl:     losartan (COZAAR) 25 MG tablet, Orally (Patient not taking: Reported on 2025), Disp: , Rfl:     phentermine (ADIPEX-P) 37.5 mg tablet, Take 37.5 mg by mouth. (Patient not taking: Reported on 2025), Disp: , Rfl:     pregabalin (LYRICA) 100 MG capsule, Take 100 mg by mouth. (Patient not taking: Reported on 2025), Disp: , Rfl:     WEGOVY 2.4 mg/0.75 mL PnIj, SMARTSI.4 Milligram(s) SUB-Q Once a Week (Patient not taking: Reported on 2025), Disp: , Rfl:

## 2025-06-05 NOTE — PATIENT INSTRUCTIONS
Echo and exercise stress test -June 24, 2025 at 1:00 pm  Heart monitor placed today   Follow-up in  6 months unless above testing is abnormal

## 2025-06-14 DIAGNOSIS — R05.9 COUGH, UNSPECIFIED TYPE: ICD-10-CM

## 2025-06-17 RX ORDER — ALBUTEROL SULFATE 90 UG/1
2 INHALANT RESPIRATORY (INHALATION) EVERY 6 HOURS PRN
Qty: 18 G | Refills: 3 | Status: SHIPPED | OUTPATIENT
Start: 2025-06-17

## 2025-06-23 ENCOUNTER — TELEPHONE (OUTPATIENT)
Dept: CARDIOLOGY | Facility: HOSPITAL | Age: 55
End: 2025-06-23
Payer: COMMERCIAL

## 2025-06-24 ENCOUNTER — HOSPITAL ENCOUNTER (OUTPATIENT)
Dept: CARDIOLOGY | Facility: HOSPITAL | Age: 55
Discharge: HOME | End: 2025-06-24
Attending: STUDENT IN AN ORGANIZED HEALTH CARE EDUCATION/TRAINING PROGRAM
Payer: COMMERCIAL

## 2025-06-24 DIAGNOSIS — R06.09 DYSPNEA ON EXERTION: ICD-10-CM

## 2025-06-24 DIAGNOSIS — R07.9 CHEST PAIN, UNSPECIFIED TYPE: ICD-10-CM

## 2025-06-24 DIAGNOSIS — R55 SYNCOPE AND COLLAPSE: ICD-10-CM

## 2025-06-24 LAB
CV STRESS BASE HR: 72 BPM
DIASTOLIC BLOOD PRESSURE: 73 MMHG
OHS CV CPX 1 MINUTE RECOVERY HEART RATE: 139 BPM
OHS CV CPX 85 PERCENT MAX PREDICTED HEART RATE MALE: 139
OHS CV CPX ESTIMATED METS: 7
OHS CV CPX MAX PREDICTED HEART RATE: 164
OHS CV CPX PATIENT IS FEMALE: 1
OHS CV CPX PATIENT IS MALE: 0
OHS CV CPX PEAK DIASTOLIC BLOOD PRESSURE: 64 MMHG
OHS CV CPX PEAK HEAR RATE: 157 BPM
OHS CV CPX PEAK RATE PRESSURE PRODUCT: NORMAL
OHS CV CPX PEAK SYSTOLIC BLOOD PRESSURE: 131 MMHG
OHS CV CPX PERCENT MAX PREDICTED HEART RATE ACHIEVED: 100
OHS CV CPX RATE PRESSURE PRODUCT PRESENTING: 8352
STRESS ECHO POST EXERCISE DUR MIN: 4 MINUTES
STRESS ECHO POST EXERCISE DUR SEC: 45 SECONDS
SYSTOLIC BLOOD PRESSURE: 116 MMHG

## 2025-06-24 PROCEDURE — 93306 TTE W/DOPPLER COMPLETE: CPT | Mod: 26,,, | Performed by: STUDENT IN AN ORGANIZED HEALTH CARE EDUCATION/TRAINING PROGRAM

## 2025-06-24 PROCEDURE — 93306 TTE W/DOPPLER COMPLETE: CPT

## 2025-06-24 PROCEDURE — 93017 CV STRESS TEST TRACING ONLY: CPT

## 2025-06-27 LAB
AORTIC ROOT ANNULUS: 2.8 CM
AORTIC VALVE CUSP SEPERATION: 1.95 CM
AV INDEX (PROSTH): 0.6
AV MEAN GRADIENT: 3 MMHG
AV PEAK GRADIENT: 5 MMHG
AV VALVE AREA BY VELOCITY RATIO: 2.2 CM²
AV VALVE AREA: 2.1 CM²
AV VELOCITY RATIO: 0.64
CV ECHO LV RWT: 0.27 CM
DOP CALC AO PEAK VEL: 1.1 M/S
DOP CALC AO VTI: 25.7 CM
DOP CALC LVOT AREA: 3.5 CM2
DOP CALC LVOT DIAMETER: 2.1 CM
DOP CALC LVOT PEAK VEL: 0.7 M/S
DOP CALC LVOT STROKE VOLUME: 53 CM3
DOP CALCLVOT PEAK VEL VTI: 15.3 CM
E WAVE DECELERATION TIME: 176 MSEC
E/A RATIO: 1.15
E/E' RATIO: 9 M/S
ECHO LV POSTERIOR WALL: 0.6 CM (ref 0.6–1.1)
FRACTIONAL SHORTENING: 28.9 % (ref 28–44)
INTERVENTRICULAR SEPTUM: 0.9 CM (ref 0.6–1.1)
IVC DIAMETER: 1.72 CM
LEFT ATRIUM AREA SYSTOLIC (APICAL 2 CHAMBER): 17.68 CM2
LEFT ATRIUM AREA SYSTOLIC (APICAL 4 CHAMBER): 18.43 CM2
LEFT ATRIUM VOLUME MOD: 50 ML
LEFT INTERNAL DIMENSION IN SYSTOLE: 3.2 CM (ref 2.1–4)
LEFT VENTRICLE DIASTOLIC VOLUME: 82 ML
LEFT VENTRICLE END SYSTOLIC VOLUME APICAL 2 CHAMBER: 49.54 ML
LEFT VENTRICLE END SYSTOLIC VOLUME APICAL 4 CHAMBER: 49.98 ML
LEFT VENTRICLE SYSTOLIC VOLUME: 42 ML
LEFT VENTRICULAR INTERNAL DIMENSION IN DIASTOLE: 4.5 CM (ref 3.5–6)
LEFT VENTRICULAR MASS: 104.5 G
LV LATERAL E/E' RATIO: 7.5 M/S
LV SEPTAL E/E' RATIO: 10.3 M/S
LVED V (TEICH): 82.01 ML
LVES V (TEICH): 41.59 ML
LVOT MG: 1.15 MMHG
LVOT MV: 0.51 CM/S
MV PEAK A VEL: 0.71 M/S
MV PEAK E VEL: 0.82 M/S
PISA MRMAX VEL: 2.23 M/S
PISA TR MAX VEL: 2.1 M/S
PV PEAK GRADIENT: 3 MMHG
PV PEAK VELOCITY: 0.81 M/S
RA PRESSURE ESTIMATED: 3 MMHG
RA VOL SYS: 41.16 ML
RIGHT ATRIAL AREA: 16 CM2
RIGHT ATRIUM VOLUME AREA LENGTH APICAL 4 CHAMBER: 39.04 ML
RIGHT VENTRICLE DIASTOLIC LENGTH: 5.6 CM
RV MID DIAMA: 1.97 CM
RV TB RVSP: 5 MMHG
TDI LATERAL: 0.11 M/S
TDI SEPTAL: 0.08 M/S
TDI: 0.1 M/S
TR MAX PG: 17 MMHG
TRICUSPID ANNULAR PLANE SYSTOLIC EXCURSION: 2.1 CM
TV REST PULMONARY ARTERY PRESSURE: 21 MMHG

## 2025-06-30 ENCOUNTER — PATIENT MESSAGE (OUTPATIENT)
Dept: CARDIOLOGY | Facility: CLINIC | Age: 55
End: 2025-06-30
Payer: COMMERCIAL

## 2025-07-01 ENCOUNTER — RESULTS FOLLOW-UP (OUTPATIENT)
Dept: CARDIOLOGY | Facility: CLINIC | Age: 55
End: 2025-07-01
Payer: COMMERCIAL

## 2025-07-01 ENCOUNTER — TELEPHONE (OUTPATIENT)
Dept: CARDIOLOGY | Facility: CLINIC | Age: 55
End: 2025-07-01
Payer: COMMERCIAL

## 2025-07-01 NOTE — TELEPHONE ENCOUNTER
Patient notified of normal Echo and stress test.  Zio is still pending.  She voiced understanding.

## 2025-07-01 NOTE — PROGRESS NOTES
Please inform Echo and exercise stress test are normal with no concerning findings. Aco Is still showing in process. Thanks.

## 2025-07-07 ENCOUNTER — PATIENT MESSAGE (OUTPATIENT)
Dept: PULMONOLOGY | Facility: CLINIC | Age: 55
End: 2025-07-07
Payer: COMMERCIAL

## 2025-07-09 ENCOUNTER — CLINICAL SUPPORT (OUTPATIENT)
Dept: PULMONOLOGY | Facility: HOSPITAL | Age: 55
End: 2025-07-09
Attending: INTERNAL MEDICINE
Payer: COMMERCIAL

## 2025-07-09 ENCOUNTER — OFFICE VISIT (OUTPATIENT)
Dept: PULMONOLOGY | Facility: CLINIC | Age: 55
End: 2025-07-09
Payer: COMMERCIAL

## 2025-07-09 VITALS
WEIGHT: 238 LBS | OXYGEN SATURATION: 99 % | HEART RATE: 78 BPM | SYSTOLIC BLOOD PRESSURE: 122 MMHG | DIASTOLIC BLOOD PRESSURE: 65 MMHG | BODY MASS INDEX: 37.28 KG/M2

## 2025-07-09 DIAGNOSIS — R91.1 PULMONARY NODULE: ICD-10-CM

## 2025-07-09 DIAGNOSIS — R06.02 SOB (SHORTNESS OF BREATH): ICD-10-CM

## 2025-07-09 DIAGNOSIS — R07.9 ACUTE CHEST PAIN: Primary | ICD-10-CM

## 2025-07-09 DIAGNOSIS — R05.9 COUGH, UNSPECIFIED TYPE: ICD-10-CM

## 2025-07-09 DIAGNOSIS — I82.499 DEEP VEIN THROMBOSIS (DVT) OF OTHER VEIN OF LOWER EXTREMITY, UNSPECIFIED CHRONICITY, UNSPECIFIED LATERALITY: ICD-10-CM

## 2025-07-09 PROCEDURE — 4010F ACE/ARB THERAPY RXD/TAKEN: CPT | Mod: ,,, | Performed by: INTERNAL MEDICINE

## 2025-07-09 PROCEDURE — 94729 DIFFUSING CAPACITY: CPT

## 2025-07-09 PROCEDURE — 94060 EVALUATION OF WHEEZING: CPT

## 2025-07-09 PROCEDURE — 27100098 HC SPACER

## 2025-07-09 PROCEDURE — 3074F SYST BP LT 130 MM HG: CPT | Mod: ,,, | Performed by: INTERNAL MEDICINE

## 2025-07-09 PROCEDURE — 99214 OFFICE O/P EST MOD 30 MIN: CPT | Mod: S$PBB,25,, | Performed by: INTERNAL MEDICINE

## 2025-07-09 PROCEDURE — 3044F HG A1C LEVEL LT 7.0%: CPT | Mod: ,,, | Performed by: INTERNAL MEDICINE

## 2025-07-09 PROCEDURE — 1159F MED LIST DOCD IN RCRD: CPT | Mod: ,,, | Performed by: INTERNAL MEDICINE

## 2025-07-09 PROCEDURE — 3008F BODY MASS INDEX DOCD: CPT | Mod: ,,, | Performed by: INTERNAL MEDICINE

## 2025-07-09 PROCEDURE — 3078F DIAST BP <80 MM HG: CPT | Mod: ,,, | Performed by: INTERNAL MEDICINE

## 2025-07-09 PROCEDURE — 99215 OFFICE O/P EST HI 40 MIN: CPT | Mod: PBBFAC,25 | Performed by: INTERNAL MEDICINE

## 2025-07-09 PROCEDURE — 99999 PR PBB SHADOW E&M-EST. PATIENT-LVL V: CPT | Mod: PBBFAC,,, | Performed by: INTERNAL MEDICINE

## 2025-07-09 NOTE — PROGRESS NOTES
Subjective:       Patient ID: Marcell Dickson is a 54 y.o. female.    Chief Complaint: Cough (Follow up- had PFT done today, no major changes since last seen )    History of Present Illness    CHIEF COMPLAINT:  Ms. Dickson presents today for follow up of shortness of breath and cough.    RESPIRATORY SYMPTOMS:  She reports partial improvement of cough symptoms with Wixela inhaler, though cough occasionally persists at night. She experiences significant improvement in shortness of breath, though mild symptoms persist. She reports chest pain with deep breathing. She denies associated cough with current respiratory symptoms.    FALL AND ASSOCIATED SYMPTOMS:  She experienced a fall in late April following a choking and gasping episode. She describes waking up suddenly choking, attempting to address something near her face, and subsequently losing consciousness. Upon regaining consciousness, she was on the floor. Since the fall, she has been experiencing persistent unilateral ear pain, uncertain if related to the fall incident. No prior explanation was provided regarding the cause of her fall.    IMAGING:  An 8 mm lung nodule was identified on X-ray in May, located in the lingula region.    CURRENT MEDICATIONS:  She takes Nexium daily in the morning for reflux, and continues Topamax and Lyrica, noting these medications may increase fall risk.      ROS:  General: -fever, -chills, -fatigue, -weight gain, -weight loss  Eyes: -vision changes, -redness, -discharge  ENT: +ear pain, -nasal congestion, -sore throat, +choking sensation  Cardiovascular: -chest pain, -palpitations, -lower extremity edema  Respiratory: -cough, +shortness of breath, +waking at night coughing, +pain with respiration, +difficulty breathing  Gastrointestinal: -abdominal pain, -nausea, -vomiting, -diarrhea, -constipation, -blood in stool  Genitourinary: -dysuria, -hematuria, -frequency  Musculoskeletal: -joint pain, -muscle pain  Skin:  "-rash, -lesion  Neurological: -headache, -dizziness, -numbness, -tingling, +syncope, +loss of consciousness  Psychiatric: -anxiety, -depression, -sleep difficulty          Objective:      Physical Exam   Constitutional: She is oriented to person, place, and time. She appears well-developed and well-nourished.   HENT:   Head: Normocephalic.   Nose: Nose normal.   Mouth/Throat: Oropharynx is clear and moist.   Neck: No JVD present. No thyromegaly present.   Cardiovascular: Normal rate, regular rhythm, normal heart sounds and intact distal pulses.   Pulmonary/Chest: Normal expansion, hyperinflation, symmetric chest wall expansion, effort normal and breath sounds normal.   Abdominal: Soft. Bowel sounds are normal.   Musculoskeletal:         General: Normal range of motion.      Cervical back: Normal range of motion and neck supple.   Lymphadenopathy: No supraclavicular adenopathy is present.     She has no cervical adenopathy.   Neurological: She is alert and oriented to person, place, and time. She has normal reflexes.   Skin: Skin is warm and dry.   Psychiatric: She has a normal mood and affect. Her behavior is normal.     Personal Diagnostic Review  none pertinent        7/9/2025    12:36 PM 7/9/2025    11:58 AM 6/5/2025     8:35 AM 6/2/2025     3:50 PM 5/15/2025     3:20 PM 4/24/2025    10:17 AM 2/25/2025    10:08 AM   Pulmonary Function Tests   FVC  2.88 liters        FVC%  80        FEV1  2.27 liters        FEV1%  80        FEF 25-75  2.11        FEF 25-75%  82        TLC (liters)  4.43 liters        TLC%  81        RV  1.55        RV%  75        DLCO (ml/mmHg sec)  16.86 ml/mmHg sec        DLCO%  75        Peak Flow  217 L/min        SpO2 99 %  99 % 100 % 97 % 98 %    Height   5' 7" (1.702 m) 5' 7" (1.702 m) 5' 7" (1.702 m) 5' 7" (1.702 m) 5' 7" (1.702 m)   Weight 108 kg (238 lb)  108.3 kg (238 lb 12.8 oz) 106.6 kg (235 lb) 106.6 kg (235 lb) 106.6 kg (235 lb) 108.4 kg (239 lb)   BMI (Calculated)   37.4 36.8 36.8 " 36.8 37.4           Current Medications[1]   Assessment:       1. Acute chest pain    2. Deep vein thrombosis (DVT) of other vein of lower extremity, unspecified chronicity, unspecified laterality    3. Pulmonary nodule    4. SOB (shortness of breath)        Encounter Medications[2]  Orders Placed This Encounter   Procedures    CTA Chest Non-Coronary (PE Studies)     Standing Status:   Future     Expected Date:   7/9/2025     Expiration Date:   7/9/2026     Is the patient allergic to iodine contrast?:   No     Does this patient have impaired renal function?:   No     Diabetes?:   No     May the Radiologist modify the order per protocol to meet the clinical needs of the patient?:   Yes    US Lower Extremity Veins Bilateral     Standing Status:   Future     Expected Date:   7/9/2025     Expiration Date:   7/9/2026     May the Radiologist modify the order per protocol to meet the clinical needs of the patient?:   Yes     Release to patient:   Immediate       Plan:       Problem List Items Addressed This Visit          Pulmonary    Pulmonary nodule    SOB (shortness of breath)     Other Visit Diagnoses         Acute chest pain    -  Primary    Relevant Orders    CTA Chest Non-Coronary (PE Studies)      Deep vein thrombosis (DVT) of other vein of lower extremity, unspecified chronicity, unspecified laterality        Relevant Orders    US Lower Extremity Veins Bilateral              Assessment & Plan    I82.499 DVT of other vein of lower extremity, unspecified chronicity, unspecified laterality  R07.9 Acute chest pain  R91.1 Pulmonary nodule  R06.02 SOB (shortness of breath)    IMPRESSION:  - Reviewed breathing test results showing small airways disease consistent with asthma syndrome, but overall good lung function.  - Improvement in cough with Wixela inhaler, noting significant but not complete resolution.  - 8 mm nodule in lingula seen on previous XR, considering 5% risk of malignancy at this size for nodules of 6-8  mm.  - Repeat imaging in 2 months (4 months from original) to monitor for changes in nodule size.  - Considered possibility of pulmonary embolism given history of sudden shortness of breath and fall.  - Noted potential fall risk from medications like Topamax and Lyrica.  - Considered ongoing reflux as possible cause of burning sensation.    DVT OF OTHER VEIN OF LOWER EXTREMITY, UNSPECIFIED CHRONICITY, UNSPECIFIED LATERALITY:  - Educated on the potential seriousness of untreated blood clots.  - Ordered studies of legs to check for blood clots.  - Ordered CTA to evaluate for pulmonary embolism.  - Follow up in 1 month.    ACUTE CHEST PAIN:  - Ordered CTA to evaluate for pulmonary embolism.  - Follow up in 1 month.    PULMONARY NODULE:  - Ordered CTA to reassess lung nodule.  - Follow up in 1 month.    SOB (SHORTNESS OF BREATH):  - Explained small airways disease as inflammation in a specific part of the lungs.  - Continued Wixela inhaler.  - Continued Nexium daily in the morning.  - Follow up in 1 month.            This note was generated with the assistance of ambient listening technology. Verbal consent was obtained by the patient and accompanying visitor(s) for the recording of patient appointment to facilitate this note. I attest to having reviewed and edited the generated note for accuracy, though some syntax or spelling errors may persist. Please contact the author of this note for any clarification.                  [1]   Current Outpatient Medications:     albuterol (PROVENTIL/VENTOLIN HFA) 90 mcg/actuation inhaler, INHALE 2 PUFFS INTO THE LUNGS EVERY 6 HOURS AS NEEDED FOR WHEEZING, Disp: 18 g, Rfl: 3    celecoxib (CELEBREX) 100 MG capsule, Take 100 mg by mouth. (Patient not taking: Reported on 6/5/2025), Disp: , Rfl:     cetirizine (ZYRTEC) 10 MG tablet, Take 1 tablet (10 mg total) by mouth once daily., Disp: 90 tablet, Rfl: 1    chlorhexidine (PERIDEX) 0.12 % solution, , Disp: , Rfl:     ergocalciferol  (DRISDOL) 50,000 unit Cap, 1 capsule., Disp: , Rfl:     EScitalopram oxalate (LEXAPRO) 10 MG tablet, Take 1 tablet (10 mg total) by mouth once daily., Disp: 90 tablet, Rfl: 1    esomeprazole (NEXIUM) 40 MG capsule, Take 1 capsule (40 mg total) by mouth before breakfast., Disp: 90 capsule, Rfl: 1    fluticasone-salmeterol diskus inhaler 250-50 mcg, Inhale 1 puff into the lungs 2 (two) times daily. Controller, Disp: 180 each, Rfl: 0    furosemide (LASIX) 20 MG tablet, Take 1 tablet (20 mg total) by mouth once daily., Disp: 90 tablet, Rfl: 1    hydroCHLOROthiazide (HYDRODIURIL) 25 MG tablet, Take 25 mg by mouth. (Patient not taking: Reported on 6/5/2025), Disp: , Rfl:     hydrOXYzine pamoate (VISTARIL) 50 MG Cap, Take 1 capsule (50 mg total) by mouth every 8 (eight) hours as needed (anxiety/itching)., Disp: 30 capsule, Rfl: 2    linaCLOtide (LINZESS) 290 mcg Cap capsule, Take 1 capsule (290 mcg total) by mouth before breakfast., Disp: 90 capsule, Rfl: 1    losartan (COZAAR) 25 MG tablet, Orally (Patient not taking: Reported on 6/5/2025), Disp: , Rfl:     phentermine (ADIPEX-P) 37.5 mg tablet, Take 37.5 mg by mouth. (Patient not taking: Reported on 6/5/2025), Disp: , Rfl:     pregabalin (LYRICA) 100 MG capsule, Take 100 mg by mouth. (Patient not taking: Reported on 6/5/2025), Disp: , Rfl:     progesterone (PROMETRIUM) 100 MG capsule, Take 100 mg by mouth every evening., Disp: , Rfl:     topiramate (TOPAMAX) 50 MG tablet, Take 50 mg by mouth every evening., Disp: , Rfl:     traZODone (DESYREL) 100 MG tablet, Take 1 tablet (100 mg total) by mouth every evening., Disp: 90 tablet, Rfl: 1    triamcinolone acetonide 0.1% (KENALOG) 0.1 % paste, SMARTSIG:sparingly To Teeth 3 Times Daily, Disp: , Rfl:   [2]   Outpatient Encounter Medications as of 7/9/2025   Medication Sig Dispense Refill    albuterol (PROVENTIL/VENTOLIN HFA) 90 mcg/actuation inhaler INHALE 2 PUFFS INTO THE LUNGS EVERY 6 HOURS AS NEEDED FOR WHEEZING 18 g 3     celecoxib (CELEBREX) 100 MG capsule Take 100 mg by mouth. (Patient not taking: Reported on 6/5/2025)      cetirizine (ZYRTEC) 10 MG tablet Take 1 tablet (10 mg total) by mouth once daily. 90 tablet 1    chlorhexidine (PERIDEX) 0.12 % solution       ergocalciferol (DRISDOL) 50,000 unit Cap 1 capsule.      EScitalopram oxalate (LEXAPRO) 10 MG tablet Take 1 tablet (10 mg total) by mouth once daily. 90 tablet 1    esomeprazole (NEXIUM) 40 MG capsule Take 1 capsule (40 mg total) by mouth before breakfast. 90 capsule 1    fluticasone-salmeterol diskus inhaler 250-50 mcg Inhale 1 puff into the lungs 2 (two) times daily. Controller 180 each 0    furosemide (LASIX) 20 MG tablet Take 1 tablet (20 mg total) by mouth once daily. 90 tablet 1    hydroCHLOROthiazide (HYDRODIURIL) 25 MG tablet Take 25 mg by mouth. (Patient not taking: Reported on 6/5/2025)      hydrOXYzine pamoate (VISTARIL) 50 MG Cap Take 1 capsule (50 mg total) by mouth every 8 (eight) hours as needed (anxiety/itching). 30 capsule 2    linaCLOtide (LINZESS) 290 mcg Cap capsule Take 1 capsule (290 mcg total) by mouth before breakfast. 90 capsule 1    losartan (COZAAR) 25 MG tablet Orally (Patient not taking: Reported on 6/5/2025)      phentermine (ADIPEX-P) 37.5 mg tablet Take 37.5 mg by mouth. (Patient not taking: Reported on 6/5/2025)      pregabalin (LYRICA) 100 MG capsule Take 100 mg by mouth. (Patient not taking: Reported on 6/5/2025)      progesterone (PROMETRIUM) 100 MG capsule Take 100 mg by mouth every evening.      topiramate (TOPAMAX) 50 MG tablet Take 50 mg by mouth every evening.      traZODone (DESYREL) 100 MG tablet Take 1 tablet (100 mg total) by mouth every evening. 90 tablet 1    triamcinolone acetonide 0.1% (KENALOG) 0.1 % paste SMARTSIG:sparingly To Teeth 3 Times Daily      [DISCONTINUED] albuterol (PROVENTIL/VENTOLIN HFA) 90 mcg/actuation inhaler INHALE 2 PUFFS INTO THE LUNGS EVERY 6 HOURS AS NEEDED FOR WHEEZING (Patient not taking: Reported  on 2025) 18 g 0    [DISCONTINUED] WEGOVY 2.4 mg/0.75 mL PnIj SMARTSI.4 Milligram(s) SUB-Q Once a Week (Patient not taking: Reported on 2025)       No facility-administered encounter medications on file as of 2025.

## 2025-07-10 NOTE — PROCEDURES
Pulmonary function test   Forced vital capacity 3.11 L 86% predicted  FEV1 2.73 L 96%  FEV1 ratio 80%   Small airways broncho reactivity  Normal lung volumes   Normal DLCO   Normal flow volume loop   Small airways disease otherwise unremarkable

## 2025-07-11 ENCOUNTER — PATIENT OUTREACH (OUTPATIENT)
Facility: HOSPITAL | Age: 55
End: 2025-07-11
Payer: COMMERCIAL

## 2025-07-11 NOTE — PROGRESS NOTES
Population Health Chart Review & Patient Outreach Details      Health Maintenance Topics Addressed and Outreach Outcomes / Actions Taken:  Breast Cancer Screening [x] SAMIR faxed to [facility] for mammo in 2025. 2nd Request.

## 2025-07-11 NOTE — LETTER
AUTHORIZATION FOR RELEASE OF   CONFIDENTIAL INFORMATION    Dear [facility],    We are seeing Marcell Dickson, date of birth 1970, in the clinic at [facility]. Arlet Rodriguez MD is the patient's PCP. Marcell Dickson has an outstanding lab/procedure at the time we reviewed her chart. In order to help keep her health information updated, she has authorized us to request the following medical record(s):        ( x )  MAMMOGRAM                                      (  )  COLONOSCOPY      (  )  PAP SMEAR                                          (  )  OUTSIDE LAB RESULTS     (  )  DEXA SCAN                                          (  )  EYE EXAM            (  )  FOOT EXAM                                          (  )  ENTIRE RECORD     (  )  OUTSIDE IMMUNIZATIONS                 ( x ) 2nd Request         Please fax records to [facility], Mallary Harvey, LPN Care Coordinator, [phone].      If you have any questions, please contact Janette at [phone]. .           Patient Name: Marcell Dickson  : 1970  Patient Phone #: [phone]                Marcell Dickson  MRN: [id number]  : 1970  Age: 54 y.o.  Sex: female         Patient/Legal Guardian Signature  This signature was collected at 2025    Marcell Dickson     Self  _______________________________   Printed Name/Relationship to Patient      Consent for Examination and Treatment: I hereby authorize the providers and employees of [facility] ([facility]) to provide medical treatment/services which includes, but is not limited to, performing and administering tests and diagnostic procedures that are deemed necessary, including, but not limited to, imaging examinations, blood tests and other laboratory procedures as may be required by the hospital, clinic, or may be ordered by my physician(s) or persons working under the general and/or  special instructions of my physician(s).      I understand and agree that this consent covers all authorized persons, including but not limited to physicians, residents, nurse practitioners, physicians' assistants, specialists, consultants, student nurses, and independently contracted physicians, who are called upon by the physician in charge, to carry out the diagnostic procedures and medical or surgical treatment.     I hereby authorize [facility] to retain or dispose of any specimens or tissue, should there be such remaining from any test or procedure.     I hereby authorize and give consent for [facility] providers and employees to take photographs, images or videotapes of such diagnostic, surgical or treatment procedures of Patient as may be required by [facility] or as may be ordered by a physician. I further acknowledge and agree that [facility] may use cameras or other devices for patient monitoring.     I am aware that the practice of medicine is not an exact science, and I acknowledge that no guarantees have been made to me as to the outcome of any tests, procedures or treatment.     Authorization for Release of Information: I understand that my insurance company and/or their agents may need information necessary to make determinations about payment/reimbursement. I hereby provide authorization to release to all insurance companies, their successors, assignees, other parties with whom they may have contracted, or others acting on their behalf, that are involved with payment for any hospital and/or clinic charges incurred by the patient, any information that they request and deem necessary for payment/reimbursement, and/or quality review.  I further authorize the release of my health information to physicians or other health care practitioners on staff who are involved in my health care now and in the future, and to other health care providers, entities, or institutions for the purpose of my continued care and  treatment, including referrals.     REGISTRATION AUTHORIZATION  Form No. 08838 (Rev. 3/25/2024)    Page 1 of 3                       Medicare Patient's Certification and Authorization to Release Information and Payment Request:  I certify that the information given by me in applying for payment under Title XVIII of the Social Security Act is correct. I authorize any [place] of medical or other information about me to release to the Social SecurityAdministration, or its intermediaries or carriers, any information needed for this or a related Medicare claim. I request that payment of authorized benefits be made on my behalf.     Assignment of Insurance Benefits:   I hereby authorize any and all insurance companies, health plans, defined   benefit plans, health insurers or any entity that is or may be responsible for payment of my medical expenses to pay all hospital and medical benefits now due, and to become due and payable to me under any hospital benefits, sick benefits, injury benefits or any other benefit for services rendered to me, including Major Medical Benefits, direct to [facility] and all independently contracted physicians. I assign any and all rights that I may have against any and all insurance companies, health plans, defined benefit plans, health insurers or any entity that is or may be responsible for payment of my medical expenses, including, but not limited to any right to appeal a denial of a claim, any right to bring any action, lawsuit, administrative proceeding, or other cause of action on my behalf. I specifically assign my right to pursue litigation against any and all insurance companies, health plans, defined benefit plans, health insurers or any entity that is or may be responsible for payment of my medical expenses based upon a refusal to pay charges.            E. Valuables: It is understood and agreed that [facility] is not liable for the damage to or loss of any money, jewelry,   documents,  dentures, eye glasses, hearing aids, prosthetics, or other property of value.     F. Computer Equipment: I understand and agree that should I choose to use computer equipment owned by [facility] or if I choose to access the Internet via [facility]s network, I do so at my own risk. [facility] is not responsible for any damage to my computer equipment or to any damages of any type that might arise from my loss of equipment or data.     G. Acceptance of Financial Responsibility:  I agree that in consideration of the services and   supplies that have been   or will be furnished to the patient, I am hereby obligated to pay all charges made for or on the account of the patient according to the standard rates (in effect at the time the services and supplies are delivered) established by [facility], including its Patient Financial Assistance Policy to the extent it is applicable. I understand that I am responsible for all charges, or portions thereof, not covered by insurance or other sources. Patient refunds will be distributed only after balances at all [facility] facilities are paid.     H. Communication Authorization:  I hereby authorize [facility] and its representatives, along with any billing service   or  who may work on their behalf, to contact me on   my cell phone and/or home phone using pre- recorded messages, artificial voice messages, automatic telephone dialing devices or other computer assisted technology, or by electronic      mail, text messaging, or by any other form of electronic communication. This includes, but is not limited to, appointment reminders, yearly physical exam reminders, preventive care reminders, patient campaigns, welcome calls, and calls about account balances on my account or any account on which I am listed as a guarantor. I understand I have the right to opt out of these communications at any time.      Relationship  Between  Facility and  Provider:      I understand that some, but  not all, providers furnishing services to the patient are not employees or agents of [facility]. The patient is under the care and supervision of his/her attending physician, and it is the responsibility of the facility and its nursing staff to carry out the instructions of such physicians. It is the responsibility of the patient's physician/designee to obtain the patient's informed consent, when required, for medical or surgical treatment, special diagnostic or therapeutic procedures, or hospital services rendered for the patient under the special instructions of the physician/designee.           REGISTRATION AUTHORIZATION  Form No. 69899 (Rev. 3/25/2024)    Page 2 of 3                       Immunizations: [facility] shares immunization information with state sponsored health departments to help you and your doctor keep track of your immunization records. By signing, you consent to have this information shared with the health department in your state:                                [place] - LINKS ([place] Immunization Network for Kids Statewide)                                [place] - MIIX ([place] Immunization Information eXchange)                                [place] - ImmPRINT (Immunization Patient Registry with Integrated Technology)     TERM: This authorization is valid for this and subsequent care/treatment I receive at [facility] and will remain valid unless/until revoked in writing by me.     [facility]: As used in this document, [facility] means all [facility] owned and managed facilities, including, but not limited to, all health centers, surgery centers, clinics, urgent care centers, and hospitals.         [facility] complies with applicable Federal civil rights laws and does not discriminate on the basis of race, color, national origin, age, disability, or sex.  ATENCIÓN: si habla derrickañol, tiene a andrews disposición servicios gratuitos de asistencia lingüística. Llame al  [phone].  GABY Ý: N?u b?n nói Ti?ng Vi?t, có các d?ch v? h? tr? ngôn ng? mi?n phí dành cho b?n. G?i s? [phone].        REGISTRATION AUTHORIZATION  Form No. 19101 (Rev. 3/25/2024)   Page 3 of 3     Patient

## 2025-07-22 ENCOUNTER — HOSPITAL ENCOUNTER (OUTPATIENT)
Dept: RADIOLOGY | Facility: HOSPITAL | Age: 55
Discharge: HOME | End: 2025-07-22
Attending: INTERNAL MEDICINE
Payer: COMMERCIAL

## 2025-07-22 DIAGNOSIS — I82.499 DEEP VEIN THROMBOSIS (DVT) OF OTHER VEIN OF LOWER EXTREMITY, UNSPECIFIED CHRONICITY, UNSPECIFIED LATERALITY: ICD-10-CM

## 2025-07-22 DIAGNOSIS — R07.9 ACUTE CHEST PAIN: ICD-10-CM

## 2025-07-22 PROCEDURE — 93970 EXTREMITY STUDY: CPT | Mod: 26,,, | Performed by: RADIOLOGY

## 2025-07-22 PROCEDURE — 71275 CT ANGIOGRAPHY CHEST: CPT | Mod: TC

## 2025-07-22 PROCEDURE — 25500020 PHARM REV CODE 255: Performed by: INTERNAL MEDICINE

## 2025-07-22 PROCEDURE — 71275 CT ANGIOGRAPHY CHEST: CPT | Mod: 26,,, | Performed by: RADIOLOGY

## 2025-07-22 PROCEDURE — 93970 EXTREMITY STUDY: CPT | Mod: TC

## 2025-07-22 RX ORDER — IOPAMIDOL 755 MG/ML
100 INJECTION, SOLUTION INTRAVASCULAR
Status: COMPLETED | OUTPATIENT
Start: 2025-07-22 | End: 2025-07-22

## 2025-07-22 RX ADMIN — IOPAMIDOL 100 ML: 755 INJECTION, SOLUTION INTRAVENOUS at 08:07

## 2025-07-28 DIAGNOSIS — K44.9 HIATAL HERNIA: Chronic | ICD-10-CM

## 2025-07-28 DIAGNOSIS — K21.00 GASTROESOPHAGEAL REFLUX DISEASE WITH ESOPHAGITIS WITHOUT HEMORRHAGE: ICD-10-CM

## 2025-07-28 RX ORDER — ESOMEPRAZOLE MAGNESIUM 40 MG/1
40 CAPSULE, DELAYED RELEASE ORAL
Qty: 90 CAPSULE | Refills: 1 | Status: SHIPPED | OUTPATIENT
Start: 2025-07-28 | End: 2025-08-01 | Stop reason: SDUPTHER

## 2025-08-01 DIAGNOSIS — K21.00 GASTROESOPHAGEAL REFLUX DISEASE WITH ESOPHAGITIS WITHOUT HEMORRHAGE: ICD-10-CM

## 2025-08-01 DIAGNOSIS — K44.9 HIATAL HERNIA: Chronic | ICD-10-CM

## 2025-08-01 RX ORDER — ESOMEPRAZOLE MAGNESIUM 40 MG/1
40 CAPSULE, DELAYED RELEASE ORAL
Qty: 90 CAPSULE | Refills: 3 | Status: SHIPPED | OUTPATIENT
Start: 2025-08-01 | End: 2026-08-01

## 2025-08-05 ENCOUNTER — PATIENT MESSAGE (OUTPATIENT)
Dept: ORTHOPEDICS | Facility: CLINIC | Age: 55
End: 2025-08-05
Payer: COMMERCIAL

## 2025-08-07 ENCOUNTER — PATIENT OUTREACH (OUTPATIENT)
Facility: HOSPITAL | Age: 55
End: 2025-08-07
Payer: COMMERCIAL

## 2025-08-07 NOTE — LETTER
AUTHORIZATION FOR RELEASE OF   CONFIDENTIAL INFORMATION    Dear [facility],    We are seeing Marcell Dickson, date of birth 1970, in the clinic at [facility]. Arlet Rodriguez MD is the patient's PCP. Marcell Dickson has an outstanding lab/procedure at the time we reviewed her chart. In order to help keep her health information updated, she has authorized us to request the following medical record(s):        ( x )  MAMMOGRAM   25                                   (  )  COLONOSCOPY      (  )  PAP SMEAR                                          (  )  OUTSIDE LAB RESULTS     (  )  DEXA SCAN                                          (  )  EYE EXAM            (  )  FOOT EXAM                                          (  )  ENTIRE RECORD     (  )  OUTSIDE IMMUNIZATIONS                 ( x )  2nd Request         Please fax records to [facility], Mallary Harvey, LPN Care Coordinator, [phone].      If you have any questions, please contact Janette at [phone]. .           Patient Name: Marcell Dickson  : 1970  Patient Phone #: [phone]                Marcell Dickson  MRN: [id number]  : 1970  Age: 54 y.o.  Sex: female         Patient/Legal Guardian Signature  This signature was collected at 2025    Marcell Dickson     Self  _______________________________   Printed Name/Relationship to Patient      Consent for Examination and Treatment: I hereby authorize the providers and employees of [facility] ([facility]) to provide medical treatment/services which includes, but is not limited to, performing and administering tests and diagnostic procedures that are deemed necessary, including, but not limited to, imaging examinations, blood tests and other laboratory procedures as may be required by the hospital, clinic, or may be ordered by my physician(s) or persons working under the general and/or  special instructions of my physician(s).      I understand and agree that this consent covers all authorized persons, including but not limited to physicians, residents, nurse practitioners, physicians' assistants, specialists, consultants, student nurses, and independently contracted physicians, who are called upon by the physician in charge, to carry out the diagnostic procedures and medical or surgical treatment.     I hereby authorize [facility] to retain or dispose of any specimens or tissue, should there be such remaining from any test or procedure.     I hereby authorize and give consent for [facility] providers and employees to take photographs, images or videotapes of such diagnostic, surgical or treatment procedures of Patient as may be required by [facility] or as may be ordered by a physician. I further acknowledge and agree that [facility] may use cameras or other devices for patient monitoring.     I am aware that the practice of medicine is not an exact science, and I acknowledge that no guarantees have been made to me as to the outcome of any tests, procedures or treatment.     Authorization for Release of Information: I understand that my insurance company and/or their agents may need information necessary to make determinations about payment/reimbursement. I hereby provide authorization to release to all insurance companies, their successors, assignees, other parties with whom they may have contracted, or others acting on their behalf, that are involved with payment for any hospital and/or clinic charges incurred by the patient, any information that they request and deem necessary for payment/reimbursement, and/or quality review.  I further authorize the release of my health information to physicians or other health care practitioners on staff who are involved in my health care now and in the future, and to other health care providers, entities, or institutions for the purpose of my continued care and  treatment, including referrals.     REGISTRATION AUTHORIZATION  Form No. 00262 (Rev. 3/25/2024)    Page 1 of 3                       Medicare Patient's Certification and Authorization to Release Information and Payment Request:  I certify that the information given by me in applying for payment under Title XVIII of the Social Security Act is correct. I authorize any [place]es [place]ca of medical or other information about me to release to the Social SecurityAdministration, or its intermediaries or carriers, any information needed for this or a related Medicare claim. I request that payment of authorized benefits be made on my behalf.     Assignment of Insurance Benefits:   I hereby authorize any and all insurance companies, health plans, defined   benefit plans, health insurers or any entity that is or may be responsible for payment of my medical expenses to pay all hospital and medical benefits now due, and to become due and payable to me under any hospital benefits, sick benefits, injury benefits or any other benefit for services rendered to me, including Major Medical Benefits, direct to [facility] and all independently contracted physicians. I assign any and all rights that I may have against any and all insurance companies, health plans, defined benefit plans, health insurers or any entity that is or may be responsible for payment of my medical expenses, including, but not limited to any right to appeal a denial of a claim, any right to bring any action, lawsuit, administrative proceeding, or other cause of action on my behalf. I specifically assign my right to pursue litigation against any and all insurance companies, health plans, defined benefit plans, health insurers or any entity that is or may be responsible for payment of my medical expenses based upon a refusal to pay charges.            E. Valuables: It is understood and agreed that [facility] is not liable for the damage to or loss of any money, jewelry,   documents,  dentures, eye glasses, hearing aids, prosthetics, or other property of value.     F. Computer Equipment: I understand and agree that should I choose to use computer equipment owned by [facility] or if I choose to access the Internet via [facility]s network, I do so at my own risk. [facility] is not responsible for any damage to my computer equipment or to any damages of any type that might arise from my loss of equipment or data.     G. Acceptance of Financial Responsibility:  I agree that in consideration of the services and   supplies that have been   or will be furnished to the patient, I am hereby obligated to pay all charges made for or on the account of the patient according to the standard rates (in effect at the time the services and supplies are delivered) established by [facility], including its Patient Financial Assistance Policy to the extent it is applicable. I understand that I am responsible for all charges, or portions thereof, not covered by insurance or other sources. Patient refunds will be distributed only after balances at all [facility] facilities are paid.     H. Communication Authorization:  I hereby authorize [facility] and its representatives, along with any billing service   or  who may work on their behalf, to contact me on   my cell phone and/or home phone using pre- recorded messages, artificial voice messages, automatic telephone dialing devices or other computer assisted technology, or by electronic      mail, text messaging, or by any other form of electronic communication. This includes, but is not limited to, appointment reminders, yearly physical exam reminders, preventive care reminders, patient campaigns, welcome calls, and calls about account balances on my account or any account on which I am listed as a guarantor. I understand I have the right to opt out of these communications at any time.      Relationship  Between  Facility and  Provider:      I understand that some, but  not all, providers furnishing services to the patient are not employees or agents of [facility]. The patient is under the care and supervision of his/her attending physician, and it is the responsibility of the facility and its nursing staff to carry out the instructions of such physicians. It is the responsibility of the patient's physician/designee to obtain the patient's informed consent, when required, for medical or surgical treatment, special diagnostic or therapeutic procedures, or hospital services rendered for the patient under the special instructions of the physician/designee.           REGISTRATION AUTHORIZATION  Form No. 25413 (Rev. 3/25/2024)    Page 2 of 3                       Immunizations: [facility] shares immunization information with state sponsored health departments to help you and your doctor keep track of your immunization records. By signing, you consent to have this information shared with the health department in your state:                                [place] - LINKS ([place] Immunization Network for Kids Statewide)                                [place] - MIIX ([place] Immunization Information eXchange)                                [place] - ImmPRINT (Immunization Patient Registry with Integrated Technology)     TERM: This authorization is valid for this and subsequent care/treatment I receive at [facility] and will remain valid unless/until revoked in writing by me.     [facility]: As used in this document, [facility] means all [facility] owned and managed facilities, including, but not limited to, all health centers, surgery centers, clinics, urgent care centers, and hospitals.         [facility] complies with applicable Federal civil rights laws and does not discriminate on the basis of race, color, national origin, age, disability, or sex.  ATENCIÓN: si habla derrickañol, tiene a andrews disposición servicios gratuitos de asistencia lingüística. Llame al  [phone].  GABY Ý: N?u b?n nói Ti?ng Vi?t, có các d?ch v? h? tr? ngôn ng? mi?n phí dành cho b?n. G?i s? [phone].        REGISTRATION AUTHORIZATION  Form No. 94695 (Rev. 3/25/2024)   Page 3 of 3     Patient

## 2025-08-07 NOTE — PROGRESS NOTES
Population Health Chart Review & Patient Outreach Details        Health Maintenance Topics Addressed and Outreach Outcomes / Actions Taken:  Breast Cancer Screening [x] SAMIR faxed to [facility] for mammo. 2nd Request.

## 2025-08-11 ENCOUNTER — TELEPHONE (OUTPATIENT)
Dept: PULMONOLOGY | Facility: CLINIC | Age: 55
End: 2025-08-11
Payer: COMMERCIAL

## 2025-08-13 ENCOUNTER — OFFICE VISIT (OUTPATIENT)
Dept: ORTHOPEDICS | Facility: CLINIC | Age: 55
End: 2025-08-13
Payer: COMMERCIAL

## 2025-08-13 ENCOUNTER — HOSPITAL ENCOUNTER (OUTPATIENT)
Dept: RADIOLOGY | Facility: HOSPITAL | Age: 55
Discharge: HOME | End: 2025-08-13
Attending: ORTHOPAEDIC SURGERY
Payer: COMMERCIAL

## 2025-08-13 VITALS
DIASTOLIC BLOOD PRESSURE: 77 MMHG | WEIGHT: 240 LBS | HEART RATE: 98 BPM | SYSTOLIC BLOOD PRESSURE: 110 MMHG | HEIGHT: 67 IN | OXYGEN SATURATION: 99 % | BODY MASS INDEX: 37.67 KG/M2

## 2025-08-13 DIAGNOSIS — G56.02 CARPAL TUNNEL SYNDROME, LEFT: Primary | ICD-10-CM

## 2025-08-13 DIAGNOSIS — G56.02 CARPAL TUNNEL SYNDROME, LEFT: ICD-10-CM

## 2025-08-13 PROCEDURE — 20526 THER INJECTION CARP TUNNEL: CPT | Mod: PBBFAC | Performed by: ORTHOPAEDIC SURGERY

## 2025-08-13 PROCEDURE — 73130 X-RAY EXAM OF HAND: CPT | Mod: TC,LT

## 2025-08-13 PROCEDURE — 99999 PR PBB SHADOW E&M-EST. PATIENT-LVL V: CPT | Mod: PBBFAC,,, | Performed by: ORTHOPAEDIC SURGERY

## 2025-08-13 PROCEDURE — 99215 OFFICE O/P EST HI 40 MIN: CPT | Mod: PBBFAC,25 | Performed by: ORTHOPAEDIC SURGERY

## 2025-08-13 PROCEDURE — 99999PBSHW PR PBB SHADOW TECHNICAL ONLY FILED TO HB: Mod: PBBFAC,,,

## 2025-08-13 RX ORDER — BUPIVACAINE HYDROCHLORIDE 5 MG/ML
1 INJECTION, SOLUTION PERINEURAL
Status: COMPLETED | OUTPATIENT
Start: 2025-08-13 | End: 2025-08-13

## 2025-08-13 RX ADMIN — BUPIVACAINE HYDROCHLORIDE 5 MG: 5 INJECTION, SOLUTION PERINEURAL at 05:08

## 2025-08-13 RX ADMIN — TRIAMCINOLONE ACETONIDE 40 MG: 80 INJECTION, SUSPENSION INTRA-ARTICULAR; INTRAMUSCULAR at 05:08

## 2025-08-25 PROBLEM — R06.02 SOB (SHORTNESS OF BREATH): Status: RESOLVED | Noted: 2025-07-09 | Resolved: 2025-08-25

## 2025-08-26 ENCOUNTER — PATIENT OUTREACH (OUTPATIENT)
Facility: HOSPITAL | Age: 55
End: 2025-08-26
Payer: COMMERCIAL

## 2025-08-27 ENCOUNTER — TELEPHONE (OUTPATIENT)
Dept: PULMONOLOGY | Facility: CLINIC | Age: 55
End: 2025-08-27
Payer: COMMERCIAL

## 2025-08-27 ENCOUNTER — PATIENT OUTREACH (OUTPATIENT)
Facility: HOSPITAL | Age: 55
End: 2025-08-27
Payer: COMMERCIAL

## 2025-08-27 DIAGNOSIS — R22.2 LOCALIZED SWELLING, MASS AND LUMP, TRUNK: Primary | ICD-10-CM

## (undated) DEVICE — PADDING WYTEX UNDRCST 2INX4YD

## (undated) DEVICE — TRAY NERVE BLOCK UNIV 10/CA

## (undated) DEVICE — GLOVE 6.5 PROTEXIS PI BLUE

## (undated) DEVICE — SLING ARM LARGE FOAM STRAP

## (undated) DEVICE — GLOVE BIOGEL SKINSENSE PI 6.5

## (undated) DEVICE — PAD CAST SPECIALIST STRL 3

## (undated) DEVICE — NDL HYPODERMIC SAFETY 25G 1IN

## (undated) DEVICE — GLOVE SENSICARE PI SURG 6.5

## (undated) DEVICE — APPLICATOR CHLORAPREP ORN 26ML

## (undated) DEVICE — CATH IV INTROCAN 22G X 1

## (undated) DEVICE — PAD CURAD NONADH 3X4IN

## (undated) DEVICE — NDL TUOHY 22G X 3.5

## (undated) DEVICE — NDL SPINAL CLR HUB 22GX4 3/4

## (undated) DEVICE — SOL CONTINU-FLO SET 2 LAV

## (undated) DEVICE — GOWN POLY REINF BRTH SLV XL

## (undated) DEVICE — KIT IV START

## (undated) DEVICE — GLOVE 8 PROTEXIS PI BLUE

## (undated) DEVICE — SYR 10CC LUER LOCK

## (undated) DEVICE — SOL NACL IRR 1000ML BTL

## (undated) DEVICE — SOCKINETTE DOUBLE PLY 4X48IN

## (undated) DEVICE — SLIPPER FALL PREV YEL BARIAT

## (undated) DEVICE — SET EXT STD BORE CATH 7.6IN

## (undated) DEVICE — SUT ETHILON 5-0 P3 18IN BLK

## (undated) DEVICE — SUT 3-0 VICRYL / FS-2

## (undated) DEVICE — BANDAGE MATRIX HK LOOP 2IN 5YD

## (undated) DEVICE — Device

## (undated) DEVICE — GLOVE BIOGEL SKINSENSE PI 7.5

## (undated) DEVICE — OXISENSOR ADULT DIGIT N/S